# Patient Record
Sex: FEMALE | Race: WHITE | Employment: OTHER | URBAN - METROPOLITAN AREA
[De-identification: names, ages, dates, MRNs, and addresses within clinical notes are randomized per-mention and may not be internally consistent; named-entity substitution may affect disease eponyms.]

---

## 2017-12-07 ENCOUNTER — OFFICE VISIT (OUTPATIENT)
Dept: INTERNAL MEDICINE | Age: 82
End: 2017-12-07

## 2017-12-07 VITALS
BODY MASS INDEX: 26.93 KG/M2 | DIASTOLIC BLOOD PRESSURE: 64 MMHG | SYSTOLIC BLOOD PRESSURE: 137 MMHG | HEART RATE: 64 BPM | WEIGHT: 152 LBS | HEIGHT: 63 IN | RESPIRATION RATE: 12 BRPM

## 2017-12-07 DIAGNOSIS — Z23 NEED FOR PROPHYLACTIC VACCINATION WITH TETANUS-DIPHTHERIA (TD): ICD-10-CM

## 2017-12-07 DIAGNOSIS — E78.00 HYPERCHOLESTEROLEMIA: ICD-10-CM

## 2017-12-07 DIAGNOSIS — Z23 NEED FOR PROPHYLACTIC VACCINATION AND INOCULATION AGAINST VARICELLA: ICD-10-CM

## 2017-12-07 DIAGNOSIS — I10 ESSENTIAL HYPERTENSION: ICD-10-CM

## 2017-12-07 DIAGNOSIS — Z23 NEED FOR PROPHYLACTIC VACCINATION AGAINST STREPTOCOCCUS PNEUMONIAE (PNEUMOCOCCUS): ICD-10-CM

## 2017-12-07 DIAGNOSIS — R73.03 PREDIABETES: Primary | ICD-10-CM

## 2017-12-07 DIAGNOSIS — E03.9 HYPOTHYROIDISM, UNSPECIFIED TYPE: ICD-10-CM

## 2017-12-07 PROBLEM — K44.9 HIATAL HERNIA: Status: ACTIVE | Noted: 2017-12-07

## 2017-12-07 PROCEDURE — G0009 ADMIN PNEUMOCOCCAL VACCINE: HCPCS | Performed by: INTERNAL MEDICINE

## 2017-12-07 PROCEDURE — 1036F TOBACCO NON-USER: CPT | Performed by: INTERNAL MEDICINE

## 2017-12-07 PROCEDURE — 1123F ACP DISCUSS/DSCN MKR DOCD: CPT | Performed by: INTERNAL MEDICINE

## 2017-12-07 PROCEDURE — 90670 PCV13 VACCINE IM: CPT | Performed by: INTERNAL MEDICINE

## 2017-12-07 PROCEDURE — 1090F PRES/ABSN URINE INCON ASSESS: CPT | Performed by: INTERNAL MEDICINE

## 2017-12-07 PROCEDURE — G8484 FLU IMMUNIZE NO ADMIN: HCPCS | Performed by: INTERNAL MEDICINE

## 2017-12-07 PROCEDURE — G8419 CALC BMI OUT NRM PARAM NOF/U: HCPCS | Performed by: INTERNAL MEDICINE

## 2017-12-07 PROCEDURE — G8427 DOCREV CUR MEDS BY ELIG CLIN: HCPCS | Performed by: INTERNAL MEDICINE

## 2017-12-07 PROCEDURE — G8400 PT W/DXA NO RESULTS DOC: HCPCS | Performed by: INTERNAL MEDICINE

## 2017-12-07 PROCEDURE — 4040F PNEUMOC VAC/ADMIN/RCVD: CPT | Performed by: INTERNAL MEDICINE

## 2017-12-07 PROCEDURE — 99203 OFFICE O/P NEW LOW 30 MIN: CPT | Performed by: INTERNAL MEDICINE

## 2017-12-07 RX ORDER — TETANUS AND DIPHTHERIA TOXOIDS ADSORBED 2; 2 [LF]/.5ML; [LF]/.5ML
0.5 INJECTION INTRAMUSCULAR ONCE
Qty: 0.5 ML | Refills: 0 | Status: SHIPPED | OUTPATIENT
Start: 2017-12-07 | End: 2017-12-07

## 2017-12-07 RX ORDER — ATORVASTATIN CALCIUM 10 MG/1
10 TABLET, FILM COATED ORAL DAILY
COMMUNITY
Start: 2017-07-18 | End: 2018-10-11 | Stop reason: SDUPTHER

## 2017-12-07 RX ORDER — LOSARTAN POTASSIUM 100 MG/1
100 TABLET ORAL DAILY
Qty: 30 TABLET | Refills: 2 | Status: SHIPPED | OUTPATIENT
Start: 2017-12-07 | End: 2018-03-09 | Stop reason: SDUPTHER

## 2017-12-07 RX ORDER — LEVOTHYROXINE SODIUM 0.05 MG/1
50 TABLET ORAL DAILY
COMMUNITY
Start: 2017-07-17 | End: 2018-06-11 | Stop reason: SDUPTHER

## 2017-12-07 RX ORDER — LOSARTAN POTASSIUM 100 MG/1
100 TABLET ORAL DAILY
COMMUNITY
End: 2017-12-07 | Stop reason: SDUPTHER

## 2017-12-07 RX ORDER — ATENOLOL 50 MG/1
50 TABLET ORAL DAILY
Refills: 1 | COMMUNITY
Start: 2017-10-12 | End: 2018-05-16 | Stop reason: SDUPTHER

## 2017-12-07 RX ORDER — AMLODIPINE BESYLATE 5 MG/1
5 TABLET ORAL DAILY
COMMUNITY
Start: 2017-07-17 | End: 2018-05-16 | Stop reason: SDUPTHER

## 2017-12-07 RX ORDER — INFLUENZA A VIRUS A/MICHIGAN/45/2015 X-275 (H1N1) ANTIGEN (FORMALDEHYDE INACTIVATED), INFLUENZA A VIRUS A/SINGAPORE/INFIMH-16-0019/2016 IVR-186 (H3N2) ANTIGEN (FORMALDEHYDE INACTIVATED), AND INFLUENZA B VIRUS B/MARYLAND/15/2016 BX-69A (A B/COLORADO/6/2017-LIKE VIRUS) ANTIGEN (FORMALDEHYDE INACTIVATED) 60; 60; 60 UG/.5ML; UG/.5ML; UG/.5ML
1 INJECTION, SUSPENSION INTRAMUSCULAR ONCE
COMMUNITY
Start: 2017-10-16 | End: 2018-03-12 | Stop reason: ALTCHOICE

## 2017-12-07 NOTE — PROGRESS NOTES
PROGRESS NOTE:    Bethany Cage    12/7/2017    Chief Complaint   Patient presents with   Crystal Trujillo       HPI:    Mr(s)Lula Cage presents to clinic today with issues noted above. Patient is taking BP medications at home without size effects, BP is being checked at home. Patient is tolerating anti-lipid meds such as statin without complications, no myalgia. She is preDM but is not on any meds. Patient denies chest pain, SOB, NVD, FC, rash, malaise, rigor, dizziness/lightheadness, other pertinent ROS was also reviewed. /64   Pulse 64   Resp 12   Ht 5' 3\" (1.6 m)   Wt 152 lb (68.9 kg)   LMP  (LMP Unknown)   BMI 26.93 kg/m²   Body mass index is 26.93 kg/m². Physical Exam:    Gen: Patient appears well groomed, well appearing  HEAD: Atraumatic, normocephalic,   Eyes: PERRLA, EOMI   Neck: supple, no thyroid nodule appreciated, no JVD  Chest: Clear to auscultation DARIUS, unlabored breathing, normal expansion  Heart: Regular rate, regular rhythm, no murmur, no rub  Abdomen: Non-tender, non-distended, bowel sounds present x3  Extremities: no edema, distal pulses intact  Patient was alert and oriented to person, place and time    [facility] was seen today for established new doctor. Diagnoses and all orders for this visit:    Prediabetes  Her A1c was 6.1% at one point, recheck, advised of low processed carb diet  -     HEMOGLOBIN A1C; Future    Essential hypertension  Should start ambulatory home BP monitoring, follow renal function  -     COMPREHENSIVE METABOLIC PANEL; Future  -     losartan (COZAAR) 100 MG tablet; Take 1 tablet by mouth daily    Hypercholesterolemia  Patient is tolerating anti-lipid meds such as statin without complications, no myalgia. -     Lipid, Fasting; Future  -     COMPREHENSIVE METABOLIC PANEL;  Future    Hypothyroidism, unspecified type  She is taking her synthroid but she take it together with other meds, follow levels  -     TSH with Reflex; APPENDECTOMY      HYSTERECTOMY, VAGINAL      TONSILLECTOMY N/A        Social History   Substance Use Topics    Smoking status: Former Smoker    Smokeless tobacco: Never Used    Alcohol use No       Family History   Problem Relation Age of Onset    Cancer Mother      BCA    Heart Attack Father     Heart Attack Brother      COPD, MI

## 2017-12-07 NOTE — PATIENT INSTRUCTIONS
Patient Education        DASH Diet: Care Instructions  Your Care Instructions    The DASH diet is an eating plan that can help lower your blood pressure. DASH stands for Dietary Approaches to Stop Hypertension. Hypertension is high blood pressure. The DASH diet focuses on eating foods that are high in calcium, potassium, and magnesium. These nutrients can lower blood pressure. The foods that are highest in these nutrients are fruits, vegetables, low-fat dairy products, nuts, seeds, and legumes. But taking calcium, potassium, and magnesium supplements instead of eating foods that are high in those nutrients does not have the same effect. The DASH diet also includes whole grains, fish, and poultry. The DASH diet is one of several lifestyle changes your doctor may recommend to lower your high blood pressure. Your doctor may also want you to decrease the amount of sodium in your diet. Lowering sodium while following the DASH diet can lower blood pressure even further than just the DASH diet alone. Follow-up care is a key part of your treatment and safety. Be sure to make and go to all appointments, and call your doctor if you are having problems. It's also a good idea to know your test results and keep a list of the medicines you take. How can you care for yourself at home? Following the DASH diet  · Eat 4 to 5 servings of fruit each day. A serving is 1 medium-sized piece of fruit, ½ cup chopped or canned fruit, 1/4 cup dried fruit, or 4 ounces (½ cup) of fruit juice. Choose fruit more often than fruit juice. · Eat 4 to 5 servings of vegetables each day. A serving is 1 cup of lettuce or raw leafy vegetables, ½ cup of chopped or cooked vegetables, or 4 ounces (½ cup) of vegetable juice. Choose vegetables more often than vegetable juice. · Get 2 to 3 servings of low-fat and fat-free dairy each day. A serving is 8 ounces of milk, 1 cup of yogurt, or 1 ½ ounces of cheese. · Eat 6 to 8 servings of grains each day. A serving is 1 slice of bread, 1 ounce of dry cereal, or ½ cup of cooked rice, pasta, or cooked cereal. Try to choose whole-grain products as much as possible. · Limit lean meat, poultry, and fish to 2 servings each day. A serving is 3 ounces, about the size of a deck of cards. · Eat 4 to 5 servings of nuts, seeds, and legumes (cooked dried beans, lentils, and split peas) each week. A serving is 1/3 cup of nuts, 2 tablespoons of seeds, or ½ cup of cooked beans or peas. · Limit fats and oils to 2 to 3 servings each day. A serving is 1 teaspoon of vegetable oil or 2 tablespoons of salad dressing. · Limit sweets and added sugars to 5 servings or less a week. A serving is 1 tablespoon jelly or jam, ½ cup sorbet, or 1 cup of lemonade. · Eat less than 2,300 milligrams (mg) of sodium a day. If you limit your sodium to 1,500 mg a day, you can lower your blood pressure even more. Tips for success  · Start small. Do not try to make dramatic changes to your diet all at once. You might feel that you are missing out on your favorite foods and then be more likely to not follow the plan. Make small changes, and stick with them. Once those changes become habit, add a few more changes. · Try some of the following:  ¨ Make it a goal to eat a fruit or vegetable at every meal and at snacks. This will make it easy to get the recommended amount of fruits and vegetables each day. ¨ Try yogurt topped with fruit and nuts for a snack or healthy dessert. ¨ Add lettuce, tomato, cucumber, and onion to sandwiches. ¨ Combine a ready-made pizza crust with low-fat mozzarella cheese and lots of vegetable toppings. Try using tomatoes, squash, spinach, broccoli, carrots, cauliflower, and onions. ¨ Have a variety of cut-up vegetables with a low-fat dip as an appetizer instead of chips and dip. ¨ Sprinkle sunflower seeds or chopped almonds over salads. Or try adding chopped walnuts or almonds to cooked vegetables.   ¨ Try some vegetarian

## 2018-03-09 DIAGNOSIS — I10 ESSENTIAL HYPERTENSION: ICD-10-CM

## 2018-03-09 RX ORDER — LOSARTAN POTASSIUM 100 MG/1
100 TABLET ORAL DAILY
Qty: 30 TABLET | Refills: 2 | Status: SHIPPED | OUTPATIENT
Start: 2018-03-09 | End: 2018-05-16 | Stop reason: SDUPTHER

## 2018-03-12 ENCOUNTER — OFFICE VISIT (OUTPATIENT)
Dept: INTERNAL MEDICINE CLINIC | Age: 83
End: 2018-03-12

## 2018-03-12 VITALS
DIASTOLIC BLOOD PRESSURE: 80 MMHG | SYSTOLIC BLOOD PRESSURE: 126 MMHG | WEIGHT: 160.2 LBS | RESPIRATION RATE: 12 BRPM | BODY MASS INDEX: 28.38 KG/M2 | HEART RATE: 52 BPM

## 2018-03-12 DIAGNOSIS — Z23 NEED FOR PROPHYLACTIC VACCINATION WITH TETANUS-DIPHTHERIA (TD): ICD-10-CM

## 2018-03-12 DIAGNOSIS — I10 ESSENTIAL HYPERTENSION: ICD-10-CM

## 2018-03-12 DIAGNOSIS — M72.2 PLANTAR FASCIITIS: Primary | ICD-10-CM

## 2018-03-12 DIAGNOSIS — E03.9 HYPOTHYROIDISM, UNSPECIFIED TYPE: ICD-10-CM

## 2018-03-12 DIAGNOSIS — E78.00 HYPERCHOLESTEROLEMIA: ICD-10-CM

## 2018-03-12 PROCEDURE — 99214 OFFICE O/P EST MOD 30 MIN: CPT | Performed by: INTERNAL MEDICINE

## 2018-03-12 RX ORDER — TETANUS AND DIPHTHERIA TOXOIDS ADSORBED 2; 2 [LF]/.5ML; [LF]/.5ML
0.5 INJECTION INTRAMUSCULAR ONCE
Qty: 0.5 ML | Refills: 0 | Status: SHIPPED | OUTPATIENT
Start: 2018-03-12 | End: 2018-03-12

## 2018-03-12 ASSESSMENT — PATIENT HEALTH QUESTIONNAIRE - PHQ9
SUM OF ALL RESPONSES TO PHQ QUESTIONS 1-9: 0
2. FEELING DOWN, DEPRESSED OR HOPELESS: 0
SUM OF ALL RESPONSES TO PHQ9 QUESTIONS 1 & 2: 0
1. LITTLE INTEREST OR PLEASURE IN DOING THINGS: 0

## 2018-03-12 NOTE — PROGRESS NOTES
meds such as statin without complications, no myalgia. Hypothyroidism, unspecified type  Patient is taking her meds as prescribed, with at least 30 min prior to other foods/meds, not missing doses. Preventive medicine: patient has considered indicated immunizations. No orders of the defined types were placed in this encounter. Prior to Admission medications    Medication Sig Start Date End Date Taking?  Authorizing Provider   losartan (COZAAR) 100 MG tablet Take 1 tablet by mouth daily 3/9/18  Yes Tabby Catalan,    amLODIPine (NORVASC) 5 MG tablet Take 5 mg by mouth daily 7/17/17  Yes Historical Provider, MD   atenolol (TENORMIN) 50 MG tablet Take 50 mg by mouth daily 10/12/17  Yes Historical Provider, MD   atorvastatin (LIPITOR) 10 MG tablet Take 10 mg by mouth daily 7/18/17  Yes Historical Provider, MD   levothyroxine (SYNTHROID) 50 MCG tablet Take 50 mcg by mouth daily 7/17/17  Yes Historical Provider, MD       Past Medical History:   Diagnosis Date    GERD (gastroesophageal reflux disease) 3/8/2011    Headache     Hyperlipidemia     Hypertension     Osteoarthritis        Past Surgical History:   Procedure Laterality Date    APPENDECTOMY      HYSTERECTOMY, VAGINAL      TONSILLECTOMY N/A        Social History   Substance Use Topics    Smoking status: Former Smoker    Smokeless tobacco: Never Used    Alcohol use No       Family History   Problem Relation Age of Onset    Cancer Mother      BCA    Heart Attack Father     Heart Attack Brother      COPD, MI

## 2018-03-12 NOTE — PATIENT INSTRUCTIONS
questions about a medical condition or this instruction, always ask your healthcare professional. [place] [address] any warranty or liability for your use of this information.

## 2018-06-11 DIAGNOSIS — I10 ESSENTIAL HYPERTENSION: ICD-10-CM

## 2018-06-11 RX ORDER — LEVOTHYROXINE SODIUM 0.05 MG/1
TABLET ORAL
Qty: 90 TABLET | Refills: 1 | Status: SHIPPED | OUTPATIENT
Start: 2018-06-11 | End: 2018-09-25 | Stop reason: SDUPTHER

## 2018-06-11 RX ORDER — LOSARTAN POTASSIUM 100 MG/1
100 TABLET ORAL DAILY
Qty: 30 TABLET | Refills: 2 | Status: SHIPPED | OUTPATIENT
Start: 2018-06-11 | End: 2018-09-24 | Stop reason: SDUPTHER

## 2018-08-16 RX ORDER — ATENOLOL 50 MG/1
TABLET ORAL
Qty: 30 TABLET | Refills: 2 | Status: SHIPPED | OUTPATIENT
Start: 2018-08-16 | End: 2018-11-11 | Stop reason: SDUPTHER

## 2018-08-16 RX ORDER — AMLODIPINE BESYLATE 5 MG/1
TABLET ORAL
Qty: 30 TABLET | Refills: 2 | Status: SHIPPED | OUTPATIENT
Start: 2018-08-16 | End: 2018-11-11 | Stop reason: SDUPTHER

## 2018-09-24 DIAGNOSIS — I10 ESSENTIAL HYPERTENSION: ICD-10-CM

## 2018-09-25 RX ORDER — LOSARTAN POTASSIUM 100 MG/1
100 TABLET ORAL DAILY
Qty: 30 TABLET | Refills: 0 | Status: SHIPPED | OUTPATIENT
Start: 2018-09-25 | End: 2018-11-08 | Stop reason: SDUPTHER

## 2018-09-25 RX ORDER — LEVOTHYROXINE SODIUM 0.05 MG/1
TABLET ORAL
Qty: 90 TABLET | Refills: 1 | Status: SHIPPED | OUTPATIENT
Start: 2018-09-25 | End: 2019-05-05 | Stop reason: SDUPTHER

## 2018-10-03 ENCOUNTER — OFFICE VISIT (OUTPATIENT)
Dept: INTERNAL MEDICINE CLINIC | Age: 83
End: 2018-10-03
Payer: MEDICARE

## 2018-10-03 VITALS
RESPIRATION RATE: 16 BRPM | HEART RATE: 64 BPM | BODY MASS INDEX: 28.52 KG/M2 | SYSTOLIC BLOOD PRESSURE: 138 MMHG | WEIGHT: 161 LBS | DIASTOLIC BLOOD PRESSURE: 88 MMHG

## 2018-10-03 DIAGNOSIS — R60.0 LOCALIZED EDEMA: ICD-10-CM

## 2018-10-03 DIAGNOSIS — R73.03 PREDIABETES: ICD-10-CM

## 2018-10-03 DIAGNOSIS — I10 ESSENTIAL HYPERTENSION: Primary | ICD-10-CM

## 2018-10-03 DIAGNOSIS — E03.9 HYPOTHYROIDISM, UNSPECIFIED TYPE: ICD-10-CM

## 2018-10-03 DIAGNOSIS — Z78.0 POST-MENOPAUSAL: ICD-10-CM

## 2018-10-03 DIAGNOSIS — E78.00 HYPERCHOLESTEROLEMIA: ICD-10-CM

## 2018-10-03 PROCEDURE — 99214 OFFICE O/P EST MOD 30 MIN: CPT | Performed by: INTERNAL MEDICINE

## 2018-10-03 RX ORDER — INFLUENZA A VIRUS A/MICHIGAN/45/2015 X-275 (H1N1) ANTIGEN (FORMALDEHYDE INACTIVATED), INFLUENZA A VIRUS A/SINGAPORE/INFIMH-16-0019/2016 IVR-186 (H3N2) ANTIGEN (FORMALDEHYDE INACTIVATED), AND INFLUENZA B VIRUS B/MARYLAND/15/2016 BX-69A (A B/COLORADO/6/2017-LIKE VIRUS) ANTIGEN (FORMALDEHYDE INACTIVATED) 60; 60; 60 UG/.5ML; UG/.5ML; UG/.5ML
1 INJECTION, SUSPENSION INTRAMUSCULAR ONCE
Refills: 0 | COMMUNITY
Start: 2018-09-25 | End: 2018-09-25

## 2018-10-03 NOTE — PATIENT INSTRUCTIONS
Patient Education        Home Blood Pressure Test: About This Test  What is it? A home blood pressure test allows you to keep track of your blood pressure at home. Blood pressure is a measure of the force of blood against the walls of your arteries. Blood pressure readings include two numbers, such as 130/80 (say \"130 over 80\"). The first number is the systolic pressure. The second number is the diastolic pressure. Why is this test done? You may do this test at home to:  · Find out if you have high blood pressure. · Track your blood pressure if you have high blood pressure. · Track how well medicine is working to reduce high blood pressure. · Check how lifestyle changes, such as weight loss and exercise, are affecting blood pressure. How can you prepare for the test?  · Do not use caffeine, tobacco, or medicines known to raise blood pressure (such as nasal decongestant sprays) for at least 30 minutes before taking your blood pressure. · Do not exercise for at least 30 minutes before taking your blood pressure. What happens before the test?  Take your blood pressure while you feel comfortable and relaxed. Sit quietly with both feet on the floor for at least 5 minutes before the test.  What happens during the test?  · Sit with your arm slightly bent and resting on a table so that your upper arm is at the same level as your heart. · Roll up your sleeve or take off your shirt to expose your upper arm. · Wrap the blood pressure cuff around your upper arm so that the lower edge of the cuff is about 1 inch above the bend of your elbow. Proceed with the following steps depending on if you are using an automatic or manual pressure monitor. Automatic blood pressure monitors  · Press the on/off button on the automatic monitor and wait until the ready-to-measure \"heart\" symbol appears next to zero in the display window. · Press the start button. The cuff will inflate and deflate by itself.   · Your blood easy to get the recommended amount of fruits and vegetables each day. ¨ Try yogurt topped with fruit and nuts for a snack or healthy dessert. ¨ Add lettuce, tomato, cucumber, and onion to sandwiches. ¨ Combine a ready-made pizza crust with low-fat mozzarella cheese and lots of vegetable toppings. Try using tomatoes, squash, spinach, broccoli, carrots, cauliflower, and onions. ¨ Have a variety of cut-up vegetables with a low-fat dip as an appetizer instead of chips and dip. ¨ Sprinkle sunflower seeds or chopped almonds over salads. Or try adding chopped walnuts or almonds to cooked vegetables. ¨ Try some vegetarian meals using beans and peas. Add garbanzo or kidney beans to salads. Make burritos and tacos with mashed barcenas beans or black beans. Where can you learn more? Go to https://[email or web address].GreenSand. org and sign in to your Collarity account. Enter N525 in the [employer] box to learn more about \"DASH Diet: Care Instructions. \"     If you do not have an account, please click on the \"Sign Up Now\" link. Current as of: December 6, 2017  Content Version: 11.7  © 6280-8323 [employer]. Care instructions adapted under license by [facility] [place] ([facility]). If you have questions about a medical condition or this instruction, always ask your healthcare professional. [place] [address] any warranty or liability for your use of this information.

## 2018-10-11 ENCOUNTER — TELEPHONE (OUTPATIENT)
Dept: INTERNAL MEDICINE CLINIC | Age: 83
End: 2018-10-11

## 2018-10-11 RX ORDER — ATORVASTATIN CALCIUM 10 MG/1
10 TABLET, FILM COATED ORAL DAILY
Qty: 30 TABLET | Refills: 5 | Status: SHIPPED | OUTPATIENT
Start: 2018-10-11 | End: 2019-02-04 | Stop reason: SDUPTHER

## 2018-11-08 DIAGNOSIS — I10 ESSENTIAL HYPERTENSION: ICD-10-CM

## 2018-11-09 RX ORDER — LOSARTAN POTASSIUM 100 MG/1
100 TABLET ORAL DAILY
Qty: 30 TABLET | Refills: 5 | Status: SHIPPED | OUTPATIENT
Start: 2018-11-09 | End: 2019-06-05 | Stop reason: SDUPTHER

## 2018-11-12 RX ORDER — ATENOLOL 50 MG/1
TABLET ORAL
Qty: 30 TABLET | Refills: 2 | Status: SHIPPED | OUTPATIENT
Start: 2018-11-12 | End: 2019-02-11 | Stop reason: SDUPTHER

## 2018-11-12 RX ORDER — AMLODIPINE BESYLATE 5 MG/1
TABLET ORAL
Qty: 30 TABLET | Refills: 2 | Status: SHIPPED | OUTPATIENT
Start: 2018-11-12 | End: 2019-02-11 | Stop reason: SDUPTHER

## 2019-02-05 RX ORDER — ATORVASTATIN CALCIUM 10 MG/1
10 TABLET, FILM COATED ORAL DAILY
Qty: 30 TABLET | Refills: 1 | Status: SHIPPED | OUTPATIENT
Start: 2019-02-05 | End: 2019-07-22 | Stop reason: SDUPTHER

## 2019-02-11 RX ORDER — ATENOLOL 50 MG/1
TABLET ORAL
Qty: 30 TABLET | Refills: 1 | Status: SHIPPED | OUTPATIENT
Start: 2019-02-11 | End: 2019-03-14 | Stop reason: SDUPTHER

## 2019-02-11 RX ORDER — AMLODIPINE BESYLATE 5 MG/1
TABLET ORAL
Qty: 30 TABLET | Refills: 1 | Status: SHIPPED | OUTPATIENT
Start: 2019-02-11 | End: 2019-03-14 | Stop reason: SDUPTHER

## 2019-03-14 RX ORDER — AMLODIPINE BESYLATE 5 MG/1
TABLET ORAL
Qty: 30 TABLET | Refills: 2 | Status: SHIPPED | OUTPATIENT
Start: 2019-03-14 | End: 2019-04-04 | Stop reason: SDUPTHER

## 2019-03-14 RX ORDER — ATENOLOL 50 MG/1
TABLET ORAL
Qty: 30 TABLET | Refills: 2 | Status: SHIPPED | OUTPATIENT
Start: 2019-03-14 | End: 2019-10-29 | Stop reason: SDUPTHER

## 2019-04-04 ENCOUNTER — TELEPHONE (OUTPATIENT)
Dept: INTERNAL MEDICINE CLINIC | Age: 84
End: 2019-04-04

## 2019-04-04 ENCOUNTER — OFFICE VISIT (OUTPATIENT)
Dept: INTERNAL MEDICINE CLINIC | Age: 84
End: 2019-04-04
Payer: MEDICARE

## 2019-04-04 VITALS
OXYGEN SATURATION: 97 % | WEIGHT: 163 LBS | BODY MASS INDEX: 30 KG/M2 | HEIGHT: 62 IN | HEART RATE: 60 BPM | DIASTOLIC BLOOD PRESSURE: 60 MMHG | SYSTOLIC BLOOD PRESSURE: 132 MMHG | RESPIRATION RATE: 16 BRPM | TEMPERATURE: 98.7 F

## 2019-04-04 DIAGNOSIS — R29.898 LEFT ARM WEAKNESS: ICD-10-CM

## 2019-04-04 DIAGNOSIS — I10 ESSENTIAL HYPERTENSION: Primary | ICD-10-CM

## 2019-04-04 DIAGNOSIS — R73.03 PREDIABETES: ICD-10-CM

## 2019-04-04 DIAGNOSIS — M54.12 CERVICAL RADICULOPATHY: ICD-10-CM

## 2019-04-04 DIAGNOSIS — E03.9 HYPOTHYROIDISM, UNSPECIFIED TYPE: ICD-10-CM

## 2019-04-04 DIAGNOSIS — E78.00 HYPERCHOLESTEROLEMIA: ICD-10-CM

## 2019-04-04 PROCEDURE — 99214 OFFICE O/P EST MOD 30 MIN: CPT | Performed by: INTERNAL MEDICINE

## 2019-04-04 RX ORDER — CELECOXIB 200 MG/1
200 CAPSULE ORAL DAILY
Qty: 30 CAPSULE | Refills: 0 | Status: SHIPPED | OUTPATIENT
Start: 2019-04-04 | End: 2019-05-02 | Stop reason: SDUPTHER

## 2019-04-04 ASSESSMENT — PATIENT HEALTH QUESTIONNAIRE - PHQ9
SUM OF ALL RESPONSES TO PHQ9 QUESTIONS 1 & 2: 1
1. LITTLE INTEREST OR PLEASURE IN DOING THINGS: 1
SUM OF ALL RESPONSES TO PHQ QUESTIONS 1-9: 1
SUM OF ALL RESPONSES TO PHQ QUESTIONS 1-9: 1
2. FEELING DOWN, DEPRESSED OR HOPELESS: 0

## 2019-04-04 NOTE — TELEPHONE ENCOUNTER
PA SUBMITTED FOR Celecoxib 200MG OR CAPS  Key: [id number] - PA Case ID: [id number] - Rx #: [id number]   STATUS: PENDING

## 2019-04-04 NOTE — PATIENT INSTRUCTIONS
doctor and loved ones know your health care wishes. Doctors advise that everyone prepare these papers before any type of surgery or procedure. Procedures can be stressful. This information will help you understand what you can expect. And it will help you safely prepare for your procedure. What happens on the day of the procedure?    · Your doctor may tell you not to eat or drink for a certain amount of time before the procedure. Follow these instructions carefully.     · Take a bath or shower before you come in for your procedure. Do not apply lotions, perfumes, deodorants, or nail polish.    At the hospital or surgery center   · Bring a picture ID.     · You may get medicine that relaxes you or puts you in a light sleep. The area being worked on will be numb.     · The procedure will take 5 to 15 minutes. You will go home about an hour later. Going home   · Be sure you have someone to drive you home. Anesthesia and pain medicine make it unsafe for you to drive.     · You will be given more specific instructions about recovering from your procedure. They will cover things like diet, follow-up care, driving, and getting back to your normal routine. When should you call your doctor? · You have questions or concerns.     · You don't understand how to prepare for your procedure.     · You become ill before the procedure (such as fever, flu, or a cold).     · You need to reschedule or have changed your mind about having the procedure. Where can you learn more? Go to [email or web address]://Mobile [email or web address] [email or web address] [email or web address] [email or web address] org and sign in to your [employer] account. Enter T330 in the [facility] box to learn more about \"Cervical Epidural Injection: Before Your Procedure. \"     If you do not have an account, please click on the \"Sign Up Now\" link. Current as of: September 20, 2018  Content Version: 11.9  © 0835-5733 [employer]. Care instructions adapted under license by [facility] ([facility]).  If you have questions about a medical condition or this instruction, always ask your healthcare professional. [address] any warranty or liability for your use of this information. Patient Education        DASH Diet: Care Instructions  Your Care Instructions    The DASH diet is an eating plan that can help lower your blood pressure. DASH stands for Dietary Approaches to Stop Hypertension. Hypertension is high blood pressure. The DASH diet focuses on eating foods that are high in calcium, potassium, and magnesium. These nutrients can lower blood pressure. The foods that are highest in these nutrients are fruits, vegetables, low-fat dairy products, nuts, seeds, and legumes. But taking calcium, potassium, and magnesium supplements instead of eating foods that are high in those nutrients does not have the same effect. The DASH diet also includes whole grains, fish, and poultry. The DASH diet is one of several lifestyle changes your doctor may recommend to lower your high blood pressure. Your doctor may also want you to decrease the amount of sodium in your diet. Lowering sodium while following the DASH diet can lower blood pressure even further than just the DASH diet alone. Follow-up care is a key part of your treatment and safety. Be sure to make and go to all appointments, and call your doctor if you are having problems. It's also a good idea to know your test results and keep a list of the medicines you take. How can you care for yourself at home? Following the DASH diet  · Eat 4 to 5 servings of fruit each day. A serving is 1 medium-sized piece of fruit, ½ cup chopped or canned fruit, 1/4 cup dried fruit, or 4 ounces (½ cup) of fruit juice. Choose fruit more often than fruit juice. · Eat 4 to 5 servings of vegetables each day. A serving is 1 cup of lettuce or raw leafy vegetables, ½ cup of chopped or cooked vegetables, or 4 ounces (½ cup) of vegetable juice.  Choose vegetables more often than vegetable juice. · Get 2 to 3 servings of low-fat and fat-free dairy each day. A serving is 8 ounces of milk, 1 cup of yogurt, or 1 ½ ounces of cheese. · Eat 6 to 8 servings of grains each day. A serving is 1 slice of bread, 1 ounce of dry cereal, or ½ cup of cooked rice, pasta, or cooked cereal. Try to choose whole-grain products as much as possible. · Limit lean meat, poultry, and fish to 2 servings each day. A serving is 3 ounces, about the size of a deck of cards. · Eat 4 to 5 servings of nuts, seeds, and legumes (cooked dried beans, lentils, and split peas) each week. A serving is 1/3 cup of nuts, 2 tablespoons of seeds, or ½ cup of cooked beans or peas. · Limit fats and oils to 2 to 3 servings each day. A serving is 1 teaspoon of vegetable oil or 2 tablespoons of salad dressing. · Limit sweets and added sugars to 5 servings or less a week. A serving is 1 tablespoon jelly or jam, ½ cup sorbet, or 1 cup of lemonade. · Eat less than 2,300 milligrams (mg) of sodium a day. If you limit your sodium to 1,500 mg a day, you can lower your blood pressure even more. Tips for success  · Start small. Do not try to make dramatic changes to your diet all at once. You might feel that you are missing out on your favorite foods and then be more likely to not follow the plan. Make small changes, and stick with them. Once those changes become habit, add a few more changes. · Try some of the following:  ? Make it a goal to eat a fruit or vegetable at every meal and at snacks. This will make it easy to get the recommended amount of fruits and vegetables each day. ? Try yogurt topped with fruit and nuts for a snack or healthy dessert. ? Add lettuce, tomato, cucumber, and onion to sandwiches. ? Combine a ready-made pizza crust with low-fat mozzarella cheese and lots of vegetable toppings. Try using tomatoes, squash, spinach, broccoli, carrots, cauliflower, and onions. ?  Have a variety of cut-up vegetables with a low-fat

## 2019-04-04 NOTE — PROGRESS NOTES
Neck: supple, no thyroid nodule appreciated, no JVD  Chest: Clear to auscultation DARIUS, unlabored breathing, normal expansion  Heart: Regular rate, regular rhythm, no murmur, no rub  Abdomen: Non-tender, non-distended, bowel sounds present x3  Extremities: no edema, distal pulses intact  Patient was alert and oriented to person, place and time  Neuro: Alert and oriented to time, place, person, normal ambulation with good balance  CN II-XII intact grossly, hand  +5/5 R, +5/5 L  Motor: Left upper extremity strength [email or web address], ~5/5@elbow,    Right upper extremity strength [email or web address], ~5/5@elbow  DTR's: L brachioradialis +3/4   R brachioradialis +2/4   L deltoid reflex +2/4    R deltoid relfex +2/4    Assessment and Plan:    Kerri Bajwa was seen today for 6 month follow-up. Diagnoses and all orders for this visit:    Cervical radiculopathy  Left arm weakness  Suspect cervical spine nerve impingement from progression OA changes, will get both Xray with MRI, consider ortho eval, start Celebrex  -     XR CERVICAL SPINE (2-3 VIEWS); Future  -     MRI CERVICAL SPINE WO CONTRAST; Future    Essential hypertension  Controlled overall, continue meds, follow renal function   -     COMPREHENSIVE METABOLIC PANEL; Future  -     Lipid, Fasting; Future  -     HEMOGLOBIN A1C; Future    Hypercholesterolemia  Patient is tolerating anti-lipid meds such as statin without complications, no myalgia.     -     COMPREHENSIVE METABOLIC PANEL; Future  -     Lipid, Fasting; Future  -     HEMOGLOBIN A1C; Future    Hypothyroidism, unspecified type  Patient is taking her meds as prescribed, with at least 30 min prior to other foods/meds, not missing doses. -     TSH with Reflex; Future    Prediabetes  -     HEMOGLOBIN A1C; Future    Other orders  -     celecoxib (CELEBREX) 200 MG capsule;  Take 1 capsule by mouth daily    Orders Placed This Encounter   Procedures    XR CERVICAL SPINE (2-3 VIEWS)    MRI CERVICAL SPINE WO CONTRAST    COMPREHENSIVE METABOLIC PANEL    Lipid, Fasting    HEMOGLOBIN A1C    TSH with Reflex       Prior to Admission medications    Medication Sig Start Date End Date Taking?  Authorizing Provider   celecoxib (CELEBREX) 200 MG capsule Take 1 capsule by mouth daily 4/4/19  Yes Shaggy Hernández DO   atenolol (TENORMIN) 50 MG tablet TAKE 1 TABLET BY MOUTH EVERY DAY 3/14/19  Yes Shaggy Hernández DO   atorvastatin (LIPITOR) 10 MG tablet Take 1 tablet by mouth daily 2/5/19  Yes Shaggy Hernández DO   losartan (COZAAR) 100 MG tablet TAKE 1 TABLET BY MOUTH DAILY 11/9/18  Yes Shaggy Hernández DO   levothyroxine (SYNTHROID) 50 MCG tablet TAKE 1 TABLET BY MOUTH EVERY DAY 9/25/18  Yes Shaggy Hernández DO   amLODIPine (NORVASC) 5 MG tablet TAKE 1 TABLET BY MOUTH EVERY DAY 4/5/19   Shaggy Hernández DO       Past Medical History:   Diagnosis Date    GERD (gastroesophageal reflux disease) 3/8/2011    Headache     Hyperlipidemia     Hypertension     Osteoarthritis        Past Surgical History:   Procedure Laterality Date    APPENDECTOMY      HYSTERECTOMY, VAGINAL      TONSILLECTOMY N/A        Social History     Tobacco Use    Smoking status: Former Smoker    Smokeless tobacco: Never Used   Substance Use Topics    Alcohol use: No       Family History   Problem Relation Age of Onset    Cancer Mother         BCA    Heart Attack Father     Heart Attack Brother         COPD, MI

## 2019-04-05 RX ORDER — AMLODIPINE BESYLATE 5 MG/1
TABLET ORAL
Qty: 30 TABLET | Refills: 5 | Status: SHIPPED | OUTPATIENT
Start: 2019-04-05 | End: 2019-09-30 | Stop reason: SDUPTHER

## 2019-04-05 RX ORDER — AMLODIPINE BESYLATE 5 MG/1
TABLET ORAL
Qty: 30 TABLET | Refills: 0 | OUTPATIENT
Start: 2019-04-05

## 2019-04-09 ENCOUNTER — HOSPITAL ENCOUNTER (OUTPATIENT)
Age: 84
Discharge: HOME | End: 2019-04-09
Payer: MEDICARE

## 2019-04-09 ENCOUNTER — HOSPITAL ENCOUNTER (OUTPATIENT)
Dept: GENERAL RADIOLOGY | Age: 84
Discharge: HOME | End: 2019-04-09
Payer: MEDICARE

## 2019-04-09 DIAGNOSIS — R29.898 LEFT ARM WEAKNESS: ICD-10-CM

## 2019-04-09 DIAGNOSIS — M54.12 CERVICAL RADICULOPATHY: ICD-10-CM

## 2019-04-09 PROCEDURE — 72040 X-RAY EXAM NECK SPINE 2-3 VW: CPT

## 2019-04-12 ENCOUNTER — HOSPITAL ENCOUNTER (OUTPATIENT)
Dept: MRI IMAGING | Age: 84
Discharge: HOME | End: 2019-04-12
Payer: MEDICARE

## 2019-04-12 DIAGNOSIS — M54.12 CERVICAL RADICULOPATHY: ICD-10-CM

## 2019-04-12 DIAGNOSIS — R29.898 LEFT ARM WEAKNESS: ICD-10-CM

## 2019-04-12 PROCEDURE — 72141 MRI NECK SPINE W/O DYE: CPT

## 2019-04-15 ENCOUNTER — TELEPHONE (OUTPATIENT)
Dept: INTERNAL MEDICINE CLINIC | Age: 84
End: 2019-04-15

## 2019-05-07 RX ORDER — LEVOTHYROXINE SODIUM 0.05 MG/1
TABLET ORAL
Qty: 90 TABLET | Refills: 1 | Status: SHIPPED | OUTPATIENT
Start: 2019-05-07 | End: 2019-10-29 | Stop reason: SDUPTHER

## 2019-06-05 DIAGNOSIS — I10 ESSENTIAL HYPERTENSION: ICD-10-CM

## 2019-06-05 RX ORDER — LOSARTAN POTASSIUM 100 MG/1
100 TABLET ORAL DAILY
Qty: 30 TABLET | Refills: 5 | Status: SHIPPED | OUTPATIENT
Start: 2019-06-05 | End: 2020-01-10 | Stop reason: SDUPTHER

## 2019-07-25 RX ORDER — ATORVASTATIN CALCIUM 10 MG/1
10 TABLET, FILM COATED ORAL DAILY
Qty: 30 TABLET | Refills: 2 | Status: SHIPPED | OUTPATIENT
Start: 2019-07-25 | End: 2019-10-21 | Stop reason: SDUPTHER

## 2019-09-11 ENCOUNTER — OFFICE VISIT (OUTPATIENT)
Dept: INTERNAL MEDICINE CLINIC | Age: 84
End: 2019-09-11
Payer: MEDICARE

## 2019-09-11 VITALS
SYSTOLIC BLOOD PRESSURE: 154 MMHG | BODY MASS INDEX: 30 KG/M2 | TEMPERATURE: 98 F | WEIGHT: 163 LBS | HEART RATE: 52 BPM | RESPIRATION RATE: 16 BRPM | DIASTOLIC BLOOD PRESSURE: 76 MMHG | OXYGEN SATURATION: 95 % | HEIGHT: 62 IN

## 2019-09-11 DIAGNOSIS — E03.9 HYPOTHYROIDISM, UNSPECIFIED TYPE: ICD-10-CM

## 2019-09-11 DIAGNOSIS — M54.12 CERVICAL RADICULOPATHY: Primary | ICD-10-CM

## 2019-09-11 DIAGNOSIS — Z80.3 FAMILY HISTORY OF BREAST CANCER: ICD-10-CM

## 2019-09-11 DIAGNOSIS — E78.5 HYPERLIPIDEMIA, UNSPECIFIED HYPERLIPIDEMIA TYPE: ICD-10-CM

## 2019-09-11 DIAGNOSIS — N18.30 STAGE 3 CHRONIC KIDNEY DISEASE (HCC): ICD-10-CM

## 2019-09-11 DIAGNOSIS — Z12.39 SCREENING FOR BREAST CANCER: ICD-10-CM

## 2019-09-11 DIAGNOSIS — I10 ESSENTIAL HYPERTENSION: ICD-10-CM

## 2019-09-11 PROBLEM — M50.90 CERVICAL DISC DISEASE: Status: ACTIVE | Noted: 2019-01-01

## 2019-09-11 PROCEDURE — 90653 IIV ADJUVANT VACCINE IM: CPT | Performed by: INTERNAL MEDICINE

## 2019-09-11 PROCEDURE — 99214 OFFICE O/P EST MOD 30 MIN: CPT | Performed by: INTERNAL MEDICINE

## 2019-09-11 PROCEDURE — G0008 ADMIN INFLUENZA VIRUS VAC: HCPCS | Performed by: INTERNAL MEDICINE

## 2019-09-11 ASSESSMENT — ENCOUNTER SYMPTOMS
NAUSEA: 0
SHORTNESS OF BREATH: 0
ABDOMINAL PAIN: 0

## 2019-09-11 NOTE — PATIENT INSTRUCTIONS
Please bring in pill bottles next time. Use Tylenol up to 1000 mg 3 times/day, not Celebrex for the neck pain.  Consider Physical Therapy    Flu shot    mammogram

## 2019-09-30 RX ORDER — AMLODIPINE BESYLATE 5 MG/1
TABLET ORAL
Qty: 30 TABLET | Refills: 5 | Status: SHIPPED | OUTPATIENT
Start: 2019-09-30 | End: 2020-04-24 | Stop reason: SDUPTHER

## 2019-10-17 ENCOUNTER — TELEPHONE (OUTPATIENT)
Dept: INTERNAL MEDICINE CLINIC | Age: 84
End: 2019-10-17

## 2019-10-17 ENCOUNTER — OFFICE VISIT (OUTPATIENT)
Dept: INTERNAL MEDICINE CLINIC | Age: 84
End: 2019-10-17
Payer: MEDICARE

## 2019-10-17 VITALS
BODY MASS INDEX: 30 KG/M2 | WEIGHT: 163 LBS | RESPIRATION RATE: 16 BRPM | OXYGEN SATURATION: 98 % | DIASTOLIC BLOOD PRESSURE: 60 MMHG | HEIGHT: 62 IN | SYSTOLIC BLOOD PRESSURE: 140 MMHG | TEMPERATURE: 97.8 F | HEART RATE: 60 BPM

## 2019-10-17 DIAGNOSIS — I10 ESSENTIAL HYPERTENSION: ICD-10-CM

## 2019-10-17 DIAGNOSIS — M54.12 CERVICAL RADICULOPATHY: ICD-10-CM

## 2019-10-17 DIAGNOSIS — Z00.00 ROUTINE GENERAL MEDICAL EXAMINATION AT A HEALTH CARE FACILITY: Primary | ICD-10-CM

## 2019-10-17 PROCEDURE — G0438 PPPS, INITIAL VISIT: HCPCS | Performed by: INTERNAL MEDICINE

## 2019-10-17 ASSESSMENT — PATIENT HEALTH QUESTIONNAIRE - PHQ9
SUM OF ALL RESPONSES TO PHQ QUESTIONS 1-9: 1
SUM OF ALL RESPONSES TO PHQ QUESTIONS 1-9: 1

## 2019-10-17 ASSESSMENT — LIFESTYLE VARIABLES: HOW OFTEN DO YOU HAVE A DRINK CONTAINING ALCOHOL: 0

## 2019-10-18 ENCOUNTER — HOSPITAL ENCOUNTER (OUTPATIENT)
Dept: PHYSICAL THERAPY | Age: 84
Setting detail: THERAPIES SERIES
Discharge: HOME | End: 2019-10-18
Payer: MEDICARE

## 2019-10-18 PROCEDURE — 97530 THERAPEUTIC ACTIVITIES: CPT | Performed by: PHYSICAL THERAPIST

## 2019-10-18 PROCEDURE — 97161 PT EVAL LOW COMPLEX 20 MIN: CPT | Performed by: PHYSICAL THERAPIST

## 2019-10-18 PROCEDURE — 97110 THERAPEUTIC EXERCISES: CPT | Performed by: PHYSICAL THERAPIST

## 2019-10-18 ASSESSMENT — PAIN DESCRIPTION - FREQUENCY: FREQUENCY: INTERMITTENT

## 2019-10-18 ASSESSMENT — PAIN DESCRIPTION - PROGRESSION: CLINICAL_PROGRESSION: GRADUALLY WORSENING

## 2019-10-18 ASSESSMENT — PAIN SCALES - GENERAL: PAINLEVEL_OUTOF10: 7

## 2019-10-18 ASSESSMENT — PAIN DESCRIPTION - PAIN TYPE: TYPE: CHRONIC PAIN

## 2019-10-18 ASSESSMENT — PAIN DESCRIPTION - ORIENTATION: ORIENTATION: LEFT

## 2019-10-18 ASSESSMENT — PAIN DESCRIPTION - LOCATION: LOCATION: SHOULDER;NECK

## 2019-10-18 ASSESSMENT — PAIN DESCRIPTION - DESCRIPTORS: DESCRIPTORS: ACHING;DULL

## 2019-10-18 ASSESSMENT — PAIN - FUNCTIONAL ASSESSMENT: PAIN_FUNCTIONAL_ASSESSMENT: PREVENTS OR INTERFERES WITH MANY ACTIVE NOT PASSIVE ACTIVITIES

## 2019-10-21 RX ORDER — ATORVASTATIN CALCIUM 10 MG/1
10 TABLET, FILM COATED ORAL DAILY
Qty: 30 TABLET | Refills: 5 | Status: SHIPPED | OUTPATIENT
Start: 2019-10-21 | End: 2020-04-23

## 2019-10-23 ENCOUNTER — HOSPITAL ENCOUNTER (OUTPATIENT)
Dept: PHYSICAL THERAPY | Age: 84
Setting detail: THERAPIES SERIES
Discharge: HOME | End: 2019-10-23
Payer: MEDICARE

## 2019-10-23 PROCEDURE — 97110 THERAPEUTIC EXERCISES: CPT

## 2019-10-23 PROCEDURE — 97140 MANUAL THERAPY 1/> REGIONS: CPT

## 2019-10-25 ENCOUNTER — HOSPITAL ENCOUNTER (OUTPATIENT)
Dept: PHYSICAL THERAPY | Age: 84
Setting detail: THERAPIES SERIES
Discharge: HOME | End: 2019-10-25
Payer: MEDICARE

## 2019-10-25 PROCEDURE — 97110 THERAPEUTIC EXERCISES: CPT

## 2019-10-25 PROCEDURE — 97140 MANUAL THERAPY 1/> REGIONS: CPT

## 2019-10-29 RX ORDER — ATENOLOL 50 MG/1
TABLET ORAL
Qty: 90 TABLET | Refills: 1 | Status: SHIPPED | OUTPATIENT
Start: 2019-10-29 | End: 2020-06-08

## 2019-10-29 RX ORDER — LEVOTHYROXINE SODIUM 0.05 MG/1
TABLET ORAL
Qty: 90 TABLET | Refills: 1 | Status: SHIPPED | OUTPATIENT
Start: 2019-10-29 | End: 2020-06-08

## 2019-10-30 ENCOUNTER — HOSPITAL ENCOUNTER (OUTPATIENT)
Dept: PHYSICAL THERAPY | Age: 84
Setting detail: THERAPIES SERIES
Discharge: HOME | End: 2019-10-30
Payer: MEDICARE

## 2019-10-30 PROCEDURE — 97140 MANUAL THERAPY 1/> REGIONS: CPT

## 2019-10-30 PROCEDURE — 97110 THERAPEUTIC EXERCISES: CPT

## 2019-11-01 ENCOUNTER — HOSPITAL ENCOUNTER (OUTPATIENT)
Dept: PHYSICAL THERAPY | Age: 84
Setting detail: THERAPIES SERIES
Discharge: HOME | End: 2019-11-01
Payer: MEDICARE

## 2019-11-01 PROCEDURE — 97110 THERAPEUTIC EXERCISES: CPT

## 2019-11-01 PROCEDURE — 97140 MANUAL THERAPY 1/> REGIONS: CPT

## 2019-11-04 ENCOUNTER — HOSPITAL ENCOUNTER (OUTPATIENT)
Dept: PHYSICAL THERAPY | Age: 84
Setting detail: THERAPIES SERIES
Discharge: HOME | End: 2019-11-04
Payer: MEDICARE

## 2019-11-04 PROCEDURE — 97140 MANUAL THERAPY 1/> REGIONS: CPT

## 2019-11-04 PROCEDURE — 97110 THERAPEUTIC EXERCISES: CPT

## 2019-11-08 ENCOUNTER — HOSPITAL ENCOUNTER (OUTPATIENT)
Dept: PHYSICAL THERAPY | Age: 84
Setting detail: THERAPIES SERIES
Discharge: HOME | End: 2019-11-08
Payer: MEDICARE

## 2019-11-08 PROCEDURE — 97110 THERAPEUTIC EXERCISES: CPT

## 2019-11-08 PROCEDURE — 97140 MANUAL THERAPY 1/> REGIONS: CPT

## 2019-11-13 ENCOUNTER — APPOINTMENT (OUTPATIENT)
Dept: PHYSICAL THERAPY | Age: 84
End: 2019-11-13
Payer: MEDICARE

## 2020-01-10 RX ORDER — LOSARTAN POTASSIUM 100 MG/1
100 TABLET ORAL DAILY
Qty: 30 TABLET | Refills: 5 | Status: SHIPPED | OUTPATIENT
Start: 2020-01-10 | End: 2020-06-16

## 2020-03-25 PROBLEM — E03.9 HYPOTHYROID: Status: RESOLVED | Noted: 2017-12-07 | Resolved: 2020-03-24

## 2020-04-23 RX ORDER — ATORVASTATIN CALCIUM 10 MG/1
TABLET, FILM COATED ORAL
Qty: 30 TABLET | Refills: 5 | Status: SHIPPED | OUTPATIENT
Start: 2020-04-23 | End: 2020-11-13

## 2020-04-24 ENCOUNTER — VIRTUAL VISIT (OUTPATIENT)
Dept: INTERNAL MEDICINE CLINIC | Age: 85
End: 2020-04-24
Payer: MEDICARE

## 2020-04-24 PROCEDURE — 99442 PR PHYS/QHP TELEPHONE EVALUATION 11-20 MIN: CPT | Performed by: INTERNAL MEDICINE

## 2020-04-24 RX ORDER — AMLODIPINE BESYLATE 5 MG/1
TABLET ORAL
Qty: 30 TABLET | Refills: 5 | Status: SHIPPED | OUTPATIENT
Start: 2020-04-24 | End: 2020-10-16

## 2020-04-24 ASSESSMENT — PATIENT HEALTH QUESTIONNAIRE - PHQ9
1. LITTLE INTEREST OR PLEASURE IN DOING THINGS: 0
SUM OF ALL RESPONSES TO PHQ QUESTIONS 1-9: 0
SUM OF ALL RESPONSES TO PHQ QUESTIONS 1-9: 0
2. FEELING DOWN, DEPRESSED OR HOPELESS: 0
SUM OF ALL RESPONSES TO PHQ9 QUESTIONS 1 & 2: 0

## 2020-04-24 NOTE — PROGRESS NOTES
mentioned above. A caregiver was present when appropriate. Due to this being a TeleHealth encounter (During PKYBE-30 public health emergency), evaluation of the following organ systems was limited: Vitals/Constitutional/EENT/Resp/CV/GI//MS/Neuro/Skin/Heme-Lymph-Imm. Pursuant to the emergency declaration under the [address] [place] [address], [address] [place] [address] and the [employer] Act, this Virtual Visit was conducted with patient's (and/or legal guardian's) consent, to reduce the patient's risk of exposure to COVID-19 and provide necessary medical care. The patient (and/or legal guardian) has also been advised to contact this office for worsening conditions or problems, and seek emergency medical treatment and/or call 911 if deemed necessary. Patient identification was verified at the start of the visit: yes    Total time spent on this encounter: 15 minutes    Services were provided through a video synchronous discussion virtually to substitute for in-person clinic visit. Patient and provider were located at their individual homes. --Owen Hanks MD on 4/26/2020 at 4:46 PM    An electronic signature was used to authenticate this note.

## 2020-05-27 ENCOUNTER — TELEPHONE (OUTPATIENT)
Dept: INTERNAL MEDICINE CLINIC | Age: 85
End: 2020-05-27

## 2020-05-27 NOTE — TELEPHONE ENCOUNTER
Patient advised and will ice and elevate. She will report in and if not improving will schedule video visit.

## 2020-06-08 RX ORDER — ATENOLOL 50 MG/1
TABLET ORAL
Qty: 90 TABLET | Refills: 1 | Status: SHIPPED | OUTPATIENT
Start: 2020-06-08 | End: 2020-06-25

## 2020-06-08 RX ORDER — LEVOTHYROXINE SODIUM 0.05 MG/1
TABLET ORAL
Qty: 90 TABLET | Refills: 1 | Status: SHIPPED | OUTPATIENT
Start: 2020-06-08 | End: 2021-05-03

## 2020-06-16 RX ORDER — LOSARTAN POTASSIUM 100 MG/1
100 TABLET ORAL DAILY
Qty: 90 TABLET | Refills: 1 | Status: SHIPPED | OUTPATIENT
Start: 2020-06-16 | End: 2021-02-01

## 2020-06-16 NOTE — TELEPHONE ENCOUNTER
Last appointment: 04/24/2020  Next appointment: 07/24/2020  Last refill: 01/10/2020 # 30 with 5 refills

## 2020-06-19 ENCOUNTER — TELEPHONE (OUTPATIENT)
Dept: INTERNAL MEDICINE CLINIC | Age: 85
End: 2020-06-19

## 2020-06-19 NOTE — TELEPHONE ENCOUNTER
Magan Maharaj is calling for his Mother, Laurel Monique. Pau More is having issues with swelling in her right knee and both ankles. Magan Maharaj states she is having memory problems and is concerned she may Alzheimers. Magan Maharaj is asking to discuss this with concern with the Doctor discreetly. Magan Maharaj states he does not have the technology to do a virtual visit. Please contact Magan Maharaj to advise.

## 2020-06-25 ENCOUNTER — HOSPITAL ENCOUNTER (OUTPATIENT)
Dept: VASCULAR LAB | Age: 85
Discharge: HOME | End: 2020-06-25
Payer: MEDICARE

## 2020-06-25 ENCOUNTER — OFFICE VISIT (OUTPATIENT)
Dept: INTERNAL MEDICINE CLINIC | Age: 85
End: 2020-06-25
Payer: MEDICARE

## 2020-06-25 VITALS
BODY MASS INDEX: 31.91 KG/M2 | HEART RATE: 52 BPM | HEIGHT: 61 IN | OXYGEN SATURATION: 98 % | SYSTOLIC BLOOD PRESSURE: 138 MMHG | WEIGHT: 169 LBS | DIASTOLIC BLOOD PRESSURE: 70 MMHG | RESPIRATION RATE: 16 BRPM | TEMPERATURE: 97.9 F

## 2020-06-25 DIAGNOSIS — R41.3 MEMORY LOSS: ICD-10-CM

## 2020-06-25 LAB
A/G RATIO: 2 (ref 1.1–2.2)
ALBUMIN SERPL-MCNC: 4.5 G/DL (ref 3.4–5)
ALP BLD-CCNC: 86 U/L (ref 40–129)
ALT SERPL-CCNC: 11 U/L (ref 10–40)
ANION GAP SERPL CALCULATED.3IONS-SCNC: 12 MMOL/L (ref 3–16)
AST SERPL-CCNC: 22 U/L (ref 15–37)
BILIRUB SERPL-MCNC: 0.5 MG/DL (ref 0–1)
BUN BLDV-MCNC: 17 MG/DL (ref 7–20)
CALCIUM SERPL-MCNC: 9.9 MG/DL (ref 8.3–10.6)
CHLORIDE BLD-SCNC: 101 MMOL/L (ref 99–110)
CO2: 27 MMOL/L (ref 21–32)
CREAT SERPL-MCNC: 1.1 MG/DL (ref 0.6–1.2)
GFR AFRICAN AMERICAN: 57
GFR NON-AFRICAN AMERICAN: 47
GLOBULIN: 2.2 G/DL
GLUCOSE BLD-MCNC: 96 MG/DL (ref 70–99)
POTASSIUM SERPL-SCNC: 4.6 MMOL/L (ref 3.5–5.1)
SODIUM BLD-SCNC: 140 MMOL/L (ref 136–145)
TOTAL PROTEIN: 6.7 G/DL (ref 6.4–8.2)
TSH REFLEX: 2.51 UIU/ML (ref 0.27–4.2)
VITAMIN B-12: 452 PG/ML (ref 211–911)

## 2020-06-25 PROCEDURE — 99214 OFFICE O/P EST MOD 30 MIN: CPT | Performed by: INTERNAL MEDICINE

## 2020-06-25 PROCEDURE — 93970 EXTREMITY STUDY: CPT

## 2020-06-25 RX ORDER — ATENOLOL 25 MG/1
TABLET ORAL
Qty: 30 TABLET | Refills: 5 | Status: SHIPPED | OUTPATIENT
Start: 2020-06-25 | End: 2020-11-04 | Stop reason: ALTCHOICE

## 2020-06-25 ASSESSMENT — ENCOUNTER SYMPTOMS
SHORTNESS OF BREATH: 0
COUGH: 0
DIARRHEA: 0
SORE THROAT: 0
ABDOMINAL PAIN: 0
TROUBLE SWALLOWING: 0
RHINORRHEA: 0
NAUSEA: 0

## 2020-07-02 ENCOUNTER — TELEPHONE (OUTPATIENT)
Dept: ADMINISTRATIVE | Age: 85
End: 2020-07-02

## 2020-07-02 NOTE — TELEPHONE ENCOUNTER
Referral Request:    Patient's son called today to request a referral to a gerontologist , pt possibly has alzheimer's disease.       Phone number where patient can be reached between 8 AM and 6 PM is [phone]  Mickie Wilburn

## 2020-07-05 NOTE — TELEPHONE ENCOUNTER
Please let patient's son know the recent blood work regarding memory loss was normal.  I would like her to get the CAT scan of the head that was ordered. Assuming the Head CT shows no other issues, will likely suggest she see a Neurologist at [address] [place] [address] [address].  See referral.

## 2020-07-06 NOTE — TELEPHONE ENCOUNTER
If declining imaging, then please give them the information for [facility] neurology. [facility] can further evaluate and determine if imaging is indicated.

## 2020-07-06 NOTE — TELEPHONE ENCOUNTER
Spoke with son they are undecided about ct wanted mri instead and explained that the insurance may not approve mri until ct scan done. They are not sure they want any radiology done at this time.  Anaid Lindsey states she is just getting old and does not need testing

## 2020-07-22 NOTE — PROGRESS NOTES
[address] [facility] PRIMARY CARE  [address] [place] [address]  Dept: [phone]  Dept Fax: [phone]  Loc: [phone]     2020     [address] (:  1935) is a 82 y.o. female, here for evaluation of the following medical concerns:    Chief Complaint   Patient presents with    3 Month Follow-Up     Swelling of right foot no better. HPI   Patient is here with her son. She declined a referral to neurology regarding her memory loss. Her son continues to be concerned. We discussed next steps including imaging. Her labs were unremarkable as relates to memory loss. She also complains of a mass of her left upper arm at the antecubital fossa. She also complains of right ankle pain. Review of Systems   Psychiatric/Behavioral: Negative for dysphoric mood and sleep disturbance. The patient is not nervous/anxious. Prior to Visit Medications    Medication Sig Taking?  Authorizing Provider   diclofenac sodium (VOLTAREN) 1 % GEL Apply 4 g topically 4 times daily  Odalys Dale MD   atenolol (TENORMIN) 25 MG tablet TAKE 1 TABLET BY MOUTH EVERY DAY  Odalys Dale MD   losartan (COZAAR) 100 MG tablet TAKE 1 TABLET BY MOUTH DAILY  Angi Tesfaye MD   levothyroxine (SYNTHROID) 50 MCG tablet TAKE 1 TABLET BY MOUTH EVERY DAY  Odalys Dale MD   amLODIPine (NORVASC) 5 MG tablet TAKE 1 TABLET BY MOUTH EVERY DAY  Odalys Dale MD   atorvastatin (LIPITOR) 10 MG tablet TAKE 1 TABLET BY MOUTH EVERY DAY  Angi Tesfaye MD        Social History     Tobacco Use    Smoking status: Former Smoker    Smokeless tobacco: Never Used   Substance Use Topics    Alcohol use: No    Drug use: No        Vitals:    20 1256   BP: 120/74   Site: Right Upper Arm   Position: Sitting   Cuff Size: Medium Adult   Pulse: 60  Comment: Regular   Resp: 16   Temp: 98.7 °F (37.1 °C)   TempSrc: Oral SpO2: 97%  Comment: Room Air   Weight: 167 lb (75.8 kg)     Estimated body mass index is 31.55 kg/m² as calculated from the following:    Height as of 6/25/20: 5' 1\" (1.549 m). Weight as of this encounter: 167 lb (75.8 kg). Physical Exam  Constitutional:       General: She is not in acute distress. Appearance: Normal appearance. HENT:      Head: Atraumatic. Cardiovascular:      Rate and Rhythm: Regular rhythm. Pulmonary:      Effort: Pulmonary effort is normal.   Musculoskeletal:      Comments: Left anticubital fossa - possible lipoma  Mild swelling right foot   Neurological:      General: No focal deficit present. Psychiatric:      Comments: Pleasant interactive affect         ASSESSMENT/PLAN:  1. Chronic pain of right ankle  Advised Voltaren gel and an ankle wrap  - XR ANKLE RIGHT (MIN 3 VIEWS); Future    2. Memory loss  Likely Alzheimer's dementia. Very low [place] score last time. She declined seeing a neurologist.  Graciela Schmidt starting Aricept. Discussed potential side effects and told her to let me know how she feels she is doing.  - MRI BRAIN WO CONTRAST; Future to check for other underlying causes    3. Arm mass, left  - Lis Arenas MD, General Surgery, [facility]      Return in about 4 months (around 11/24/2020). Age and gender appropriate preventive health care needs were discussed with patient and addressed as needed.

## 2020-07-24 ENCOUNTER — OFFICE VISIT (OUTPATIENT)
Dept: INTERNAL MEDICINE CLINIC | Age: 85
End: 2020-07-24
Payer: MEDICARE

## 2020-07-24 VITALS
HEART RATE: 60 BPM | OXYGEN SATURATION: 97 % | DIASTOLIC BLOOD PRESSURE: 74 MMHG | WEIGHT: 167 LBS | TEMPERATURE: 98.7 F | BODY MASS INDEX: 31.55 KG/M2 | RESPIRATION RATE: 16 BRPM | SYSTOLIC BLOOD PRESSURE: 120 MMHG

## 2020-07-24 PROCEDURE — 99214 OFFICE O/P EST MOD 30 MIN: CPT | Performed by: INTERNAL MEDICINE

## 2020-07-24 RX ORDER — DONEPEZIL HYDROCHLORIDE 5 MG/1
5 TABLET, FILM COATED ORAL NIGHTLY
Qty: 30 TABLET | Refills: 2 | Status: SHIPPED | OUTPATIENT
Start: 2020-07-24 | End: 2020-08-27 | Stop reason: SINTOL

## 2020-08-03 ENCOUNTER — OFFICE VISIT (OUTPATIENT)
Dept: SURGERY | Age: 85
End: 2020-08-03
Payer: MEDICARE

## 2020-08-03 VITALS
SYSTOLIC BLOOD PRESSURE: 143 MMHG | WEIGHT: 170 LBS | BODY MASS INDEX: 32.1 KG/M2 | HEIGHT: 61 IN | DIASTOLIC BLOOD PRESSURE: 74 MMHG | TEMPERATURE: 96.9 F | HEART RATE: 52 BPM

## 2020-08-03 PROCEDURE — 99202 OFFICE O/P NEW SF 15 MIN: CPT | Performed by: SURGERY

## 2020-08-03 NOTE — PROGRESS NOTES
PATIENT NAME: Michael Gilbert     YOB: 1935     TODAY'S DATE: 8/3/2020    Reason for Visit:  Left arm mass     Requesting Physician:  Dr. Sil Hennessy:              The patient is a 82 y.o. female who presents with   Chief Complaint   Patient presents with    New Patient     arm mass, Ref: Dr Daniela Kim    Ms. Gregor Reyes is a pleasant 82year old female with a past medical history significant for HTN, HLD, OA, and CKD who presents with concerns for a left arm mass. The patient states that it has been present for some time. It is located in the left antecubital fossa. She states that she does have pain that shoots up and down her arm. She also notes shooting pains in her left shoulder. She believes that it may have increased in size lately. REVIEW OF SYSTEMS:  CONSTITUTIONAL:  negative  HEENT:  negative  RESPIRATORY:  negative  CARDIOVASCULAR:  negative  GASTROINTESTINAL:  negative  GENITOURINARY:  negative  HEMATOLOGIC/LYMPHATIC:  negative  NEUROLOGICAL:  Pins and needle pains of the left arm     PHYSICAL EXAM:  VITALS:  BP (!) 143/74   Pulse 52   Temp 96.9 °F (36.1 °C)   Ht 5' 1\" (1.549 m)   Wt 170 lb (77.1 kg)   LMP  (LMP Unknown)   BMI 32.12 kg/m²     CONSTITUTIONAL:  alert, no apparent distress  EYES:  sclera clear  ENT:  normocepalic, without obvious abnormality  NECK:  supple, symmetrical, trachea midline and no lymphadenopathy  LUNGS:  Symmetric chest rise, normal effort, on RA  CARDIOVASCULAR:  regular rate and rhythm   ABDOMEN: soft, non-distended   MUSCULOSKELETAL:  0+ pitting edema lower extremities, left antecubital fossa with small, soft, mobile mass   NEUROLOGIC:  Mental Status Exam:  Level of Alertness:   awake  Orientation:   person, place, time  SKIN:  normal skin color, texture, turgor    IMPRESSION/RECOMMENDATIONS:    Ms. Gregor Reyes is an 82year old who presents with left antecubital mass. On examination the mass is soft and mobile likely a lipoma.  Symptoms of pins and needles unlikely related to the mass. Offered surgery to remove the antecubital mass but she would like to hold off for now. She was instructed to return to clinic if any changes in exam.     Hansel Catie,  PGY4  8/3/20  5:25 PM  [phone]    I have seen, examined, and reviewed the patients chart. I agree with the residents assessment and have made appropriate changes.     Chang Longoria

## 2020-08-07 ENCOUNTER — HOSPITAL ENCOUNTER (OUTPATIENT)
Dept: MRI IMAGING | Age: 85
Discharge: HOME | End: 2020-08-07
Payer: MEDICARE

## 2020-08-07 ENCOUNTER — HOSPITAL ENCOUNTER (OUTPATIENT)
Dept: GENERAL RADIOLOGY | Age: 85
Discharge: HOME | End: 2020-08-07
Payer: MEDICARE

## 2020-08-07 PROCEDURE — 73610 X-RAY EXAM OF ANKLE: CPT

## 2020-08-07 PROCEDURE — 70551 MRI BRAIN STEM W/O DYE: CPT

## 2020-08-17 RX ORDER — DONEPEZIL HYDROCHLORIDE 5 MG/1
TABLET, FILM COATED ORAL
Qty: 30 TABLET | Refills: 2 | OUTPATIENT
Start: 2020-08-17

## 2020-10-16 RX ORDER — AMLODIPINE BESYLATE 5 MG/1
TABLET ORAL
Qty: 90 TABLET | Refills: 1 | Status: SHIPPED | OUTPATIENT
Start: 2020-10-16 | End: 2021-10-01

## 2020-10-16 NOTE — TELEPHONE ENCOUNTER
Last appointment: 7/24/2020  Next appointment: 11/25/2020  Last refill: 04/24/2020 # 30 with 5 refills

## 2020-10-28 ENCOUNTER — TELEPHONE (OUTPATIENT)
Dept: INTERNAL MEDICINE CLINIC | Age: 85
End: 2020-10-28

## 2020-10-28 NOTE — TELEPHONE ENCOUNTER
Moved appt up as it was scheduled for week  is out. Bonilla French states she is not sure how to discuss this in front of Bakari Romero. He may send questions through [facility]

## 2020-10-28 NOTE — TELEPHONE ENCOUNTER
It is generally used when people are having lots of mood swings related to dementia. We can discuss it at the next visit in November as it does have some side effects. They can make a sooner visit if desired.

## 2020-10-28 NOTE — TELEPHONE ENCOUNTER
Patient's son Bonilla French states that a relative told him that his mother was prescribed Seroquel for Alzheimer's. He is requesting to know if his mother would benefit from this. Please contact Bonilla French @ phone # provided.

## 2020-10-30 ENCOUNTER — TELEPHONE (OUTPATIENT)
Dept: INTERNAL MEDICINE CLINIC | Age: 85
End: 2020-10-30

## 2020-10-30 NOTE — TELEPHONE ENCOUNTER
Please tell patient's son Luc Arnold that I got his letter about Seroquel and we will address at upcoming visit.

## 2020-11-04 ENCOUNTER — OFFICE VISIT (OUTPATIENT)
Dept: INTERNAL MEDICINE CLINIC | Age: 85
End: 2020-11-04
Payer: MEDICARE

## 2020-11-04 VITALS
WEIGHT: 160 LBS | DIASTOLIC BLOOD PRESSURE: 64 MMHG | HEART RATE: 50 BPM | RESPIRATION RATE: 16 BRPM | BODY MASS INDEX: 30.23 KG/M2 | TEMPERATURE: 98.5 F | SYSTOLIC BLOOD PRESSURE: 120 MMHG | OXYGEN SATURATION: 97 %

## 2020-11-04 PROCEDURE — 93000 ELECTROCARDIOGRAM COMPLETE: CPT | Performed by: INTERNAL MEDICINE

## 2020-11-04 PROCEDURE — 99214 OFFICE O/P EST MOD 30 MIN: CPT | Performed by: INTERNAL MEDICINE

## 2020-11-04 RX ORDER — QUETIAPINE FUMARATE 25 MG/1
25 TABLET, FILM COATED ORAL EVERY EVENING
Qty: 30 TABLET | Refills: 0 | Status: SHIPPED | OUTPATIENT
Start: 2020-11-04 | End: 2020-11-29

## 2020-11-04 NOTE — PROGRESS NOTES
[address] [facility] PRIMARY CARE  [address] [facility] [address]  Dept: [phone]  Dept Fax: [phone]  Loc: [phone]     2020     Libertad Gilbert (:  1935) is a 82 y.o. female, here for evaluation of the following medical concerns:    Chief Complaint   Patient presents with    Follow-up     Episode yesterday where patient near syncopal episode, blood pressure was 105/61. Patient refused ER visit. HPI   Patient is here with her son to discuss possible addition of medication for agitation that she has at night. Her son sent me a note describing the situation which I will copy to the chart. The patient does not seem to remember these episodes or be bothered by them. In addition she had a near syncopal episode yesterday and was encouraged to go to the emergency room but she declined. He states that she passed out in the doorway of the bathroom. She remembers that she vomited. She had to be helped up by her brother. She denies incontinence at the time. She denies headache or irregular heartbeat. She is not a good historian due to her apparent memory loss. Review of Systems   Constitutional: Negative for fatigue and fever. HENT: Negative for rhinorrhea, sore throat and trouble swallowing. Eyes: Negative for visual disturbance. Respiratory: Negative for cough and shortness of breath. Cardiovascular: Negative for chest pain and palpitations. Gastrointestinal: Positive for vomiting. Negative for abdominal pain, diarrhea and nausea. Genitourinary: Negative for decreased urine volume, dysuria and frequency. Musculoskeletal: Negative for arthralgias and myalgias. Skin: Negative for rash. Allergic/Immunologic: Negative for immunocompromised state. Neurological: Positive for syncope. Negative for dizziness, numbness and headaches. Hematological: Does not bruise/bleed easily.    Psychiatric/Behavioral: Positive for agitation and behavioral problems. Negative for dysphoric mood and sleep disturbance. The patient is not nervous/anxious. Prior to Visit Medications    Medication Sig Taking? Authorizing Provider   amLODIPine (NORVASC) 5 MG tablet TAKE 1 TABLET BY MOUTH EVERY DAY  Odalys Dale MD   diclofenac sodium (VOLTAREN) 1 % GEL Apply 4 g topically 4 times daily  Radha Hameed MD   atenolol (TENORMIN) 25 MG tablet TAKE 1 TABLET BY MOUTH EVERY DAY  Abbie Otilio Crigler, MD   losartan (COZAAR) 100 MG tablet TAKE 1 TABLET BY MOUTH DAILY  Abbie Otilio Crigler, MD   levothyroxine (SYNTHROID) 50 MCG tablet TAKE 1 TABLET BY MOUTH EVERY DAY  Odalys Dale MD   atorvastatin (LIPITOR) 10 MG tablet TAKE 1 TABLET BY MOUTH EVERY DAY  Radha Hameed MD        Social History     Tobacco Use    Smoking status: Former Smoker    Smokeless tobacco: Never Used   Substance Use Topics    Alcohol use: No    Drug use: No        Vitals:    11/04/20 1141   BP: 120/64   Site: Right Upper Arm   Position: Sitting   Cuff Size: Medium Adult   Pulse: 50  Comment: Regular   Resp: 16   Temp: 98.5 °F (36.9 °C)   TempSrc: Oral   SpO2: 97%  Comment: Room AIr   Weight: 160 lb (72.6 kg)     Estimated body mass index is 30.23 kg/m² as calculated from the following:    Height as of 8/3/20: 5' 1\" (1.549 m). Weight as of this encounter: 160 lb (72.6 kg). Physical Exam  Vitals signs and nursing note reviewed. Constitutional:       General: She is not in acute distress. Appearance: She is well-developed. HENT:      Head: Normocephalic and atraumatic. Right Ear: External ear normal.      Left Ear: External ear normal.      Nose: Nose normal.   Eyes:      General: No scleral icterus. Pupils: Pupils are equal, round, and reactive to light. Neck:      Thyroid: No thyromegaly. Cardiovascular:      Rate and Rhythm: Regular rhythm. Bradycardia present. Heart sounds:  No murmur. Pulmonary:      Effort: No respiratory distress. Breath sounds: Normal breath sounds. No wheezing or rales. Abdominal:      General: Bowel sounds are normal. There is no distension. Palpations: Abdomen is soft. Tenderness: There is no abdominal tenderness. Musculoskeletal: Normal range of motion. Lymphadenopathy:      Cervical: No cervical adenopathy. Skin:     General: Skin is warm and dry. Neurological:      Mental Status: She is alert. Cranial Nerves: No cranial nerve deficit. Sensory: No sensory deficit. Coordination: Coordination normal.   Psychiatric:         Behavior: Behavior normal.      Comments: Unable to remember yesterday's events         ASSESSMENT/PLAN:  1. Memory loss  Patient has declined evaluation by a specialist or additional imaging for what appears to be significant dementia. Does not seem to have good insight into her problem. I advised her she should not be driving which she was not happy to hear.-Told her she can get a driving evaluation if she disagreed. Discussed with her and her son Servivianal might improve her agitation at night but she would have to be willing to take it. I did provide a prescription and asked him to let me know the results. Discussed potential side effects including weight gain and hyperglycemia and heart rhythm abnormalities. - QUEtiapine (SEROQUEL) 25 MG tablet; Take 1 tablet by mouth every evening  Dispense: 30 tablet; Refill: 0    2. Syncope, unspecified syncope type  Unclear if this was a syncopal episode or mechanical fall. Due to slow heart rate on EKG, will discontinue atenolol.  - CBC; Future  - EKG 12 Lead    3. Abnormal bone density screening    - DEXA BONE DENSITY AXIAL SKELETON; Future    4. Essential hypertension  Expect control will be adequate on losartan and amlodipine alone. 5. Hyperlipidemia, unspecified hyperlipidemia type  Continue statin  - LIPID PANEL;  Future  - Comprehensive Metabolic Panel, Fasting; Future    6. Hypothyroidism, unspecified type  Check labs on levothyroxine  - TSH without Reflex; Future    7. Screening for unspecified condition    - HEMOGLOBIN A1C; Future      Return in about 3 months (around 2/4/2021). Age and gender appropriate preventive health care needs were discussed with patient and addressed as needed.

## 2020-11-06 ENCOUNTER — HOSPITAL ENCOUNTER (OUTPATIENT)
Dept: GENERAL RADIOLOGY | Age: 85
Discharge: HOME | End: 2020-11-06
Payer: MEDICARE

## 2020-11-06 DIAGNOSIS — E78.5 HYPERLIPIDEMIA, UNSPECIFIED HYPERLIPIDEMIA TYPE: ICD-10-CM

## 2020-11-06 DIAGNOSIS — I13.0 HYPERTENSIVE HEART AND CHRONIC KIDNEY DISEASE WITH HEART FAILURE AND STAGE 1 THROUGH STAGE 4 CHRONIC KIDNEY DISEASE, OR UNSPECIFIED CHRONIC KIDNEY DISEASE (HCC): ICD-10-CM

## 2020-11-06 DIAGNOSIS — R55 SYNCOPE, UNSPECIFIED SYNCOPE TYPE: ICD-10-CM

## 2020-11-06 DIAGNOSIS — Z13.9 SCREENING FOR UNSPECIFIED CONDITION: ICD-10-CM

## 2020-11-06 DIAGNOSIS — M79.89 LEG SWELLING: ICD-10-CM

## 2020-11-06 DIAGNOSIS — E03.9 HYPOTHYROIDISM, UNSPECIFIED TYPE: ICD-10-CM

## 2020-11-06 LAB
A/G RATIO: 2 (ref 1.1–2.2)
ALBUMIN SERPL-MCNC: 4.3 G/DL (ref 3.4–5)
ALP BLD-CCNC: 91 U/L (ref 40–129)
ALT SERPL-CCNC: 12 U/L (ref 10–40)
ANION GAP SERPL CALCULATED.3IONS-SCNC: 14 MMOL/L (ref 3–16)
AST SERPL-CCNC: 21 U/L (ref 15–37)
BILIRUB SERPL-MCNC: 0.5 MG/DL (ref 0–1)
BUN BLDV-MCNC: 29 MG/DL (ref 7–20)
CALCIUM SERPL-MCNC: 9.6 MG/DL (ref 8.3–10.6)
CHLORIDE BLD-SCNC: 99 MMOL/L (ref 99–110)
CHOLESTEROL, TOTAL: 176 MG/DL (ref 0–199)
CO2: 24 MMOL/L (ref 21–32)
CREAT SERPL-MCNC: 1.3 MG/DL (ref 0.6–1.2)
GFR AFRICAN AMERICAN: 47
GFR NON-AFRICAN AMERICAN: 39
GLOBULIN: 2.1 G/DL
GLUCOSE FASTING: 96 MG/DL (ref 70–99)
HCT VFR BLD CALC: 41.1 % (ref 36–48)
HDLC SERPL-MCNC: 68 MG/DL (ref 40–60)
HEMOGLOBIN: 13.4 G/DL (ref 12–16)
LDL CHOLESTEROL CALCULATED: 80 MG/DL
MCH RBC QN AUTO: 28.7 PG (ref 26–34)
MCHC RBC AUTO-ENTMCNC: 32.6 G/DL (ref 31–36)
MCV RBC AUTO: 87.9 FL (ref 80–100)
PDW BLD-RTO: 14.8 % (ref 12.4–15.4)
PLATELET # BLD: NORMAL K/UL (ref 135–450)
PLATELET SLIDE REVIEW: NORMAL
PMV BLD AUTO: NORMAL FL (ref 5–10.5)
POTASSIUM SERPL-SCNC: 4.5 MMOL/L (ref 3.5–5.1)
PRO-BNP: 221 PG/ML (ref 0–449)
RBC # BLD: 4.67 M/UL (ref 4–5.2)
SLIDE REVIEW: NORMAL
SODIUM BLD-SCNC: 137 MMOL/L (ref 136–145)
TOTAL PROTEIN: 6.4 G/DL (ref 6.4–8.2)
TRIGL SERPL-MCNC: 139 MG/DL (ref 0–150)
TSH SERPL DL<=0.05 MIU/L-ACNC: 2.31 UIU/ML (ref 0.27–4.2)
VLDLC SERPL CALC-MCNC: 28 MG/DL
WBC # BLD: 9.4 K/UL (ref 4–11)

## 2020-11-06 PROCEDURE — 77080 DXA BONE DENSITY AXIAL: CPT

## 2020-11-07 LAB
ESTIMATED AVERAGE GLUCOSE: 119.8 MG/DL
HBA1C MFR BLD: 5.8 %

## 2020-11-08 ASSESSMENT — ENCOUNTER SYMPTOMS
SORE THROAT: 0
TROUBLE SWALLOWING: 0
VOMITING: 1
SHORTNESS OF BREATH: 0
COUGH: 0
NAUSEA: 0
RHINORRHEA: 0
DIARRHEA: 0
ABDOMINAL PAIN: 0

## 2020-11-13 RX ORDER — ATORVASTATIN CALCIUM 10 MG/1
TABLET, FILM COATED ORAL
Qty: 90 TABLET | Refills: 1 | Status: SHIPPED | OUTPATIENT
Start: 2020-11-13 | End: 2021-05-06

## 2020-11-30 NOTE — TELEPHONE ENCOUNTER
Last appointment: 11/4/2020  Next appointment: 2/5/2021  Last refill: Requesting 90 day supply per patient request.      Left message for patient's son, Manual to return call.

## 2020-11-30 NOTE — TELEPHONE ENCOUNTER
Patient's son was supposed to let me know if medicine was effective without major side effects. Haven't heard so please find out before I refill it.

## 2020-12-01 RX ORDER — QUETIAPINE FUMARATE 25 MG/1
TABLET, FILM COATED ORAL
Qty: 90 TABLET | Refills: 0 | OUTPATIENT
Start: 2020-12-01

## 2020-12-01 NOTE — TELEPHONE ENCOUNTER
Oziel De Oliveira states that patient took Seroquel a few times, and didn't like the way it made her feel. He said the medication caused her to sleep all night, and it was a great help to him. She refuses to take this medication any longer. He states the refill isn't necessary as she has most of it left.

## 2021-02-01 DIAGNOSIS — I10 ESSENTIAL HYPERTENSION: ICD-10-CM

## 2021-02-01 RX ORDER — LOSARTAN POTASSIUM 100 MG/1
TABLET ORAL
Qty: 90 TABLET | Refills: 1 | Status: SHIPPED | OUTPATIENT
Start: 2021-02-01 | End: 2021-07-29

## 2021-02-01 NOTE — TELEPHONE ENCOUNTER
Last appointment: 11/4/2020  Next appointment: 2/22/2021  Last refill: 06/16/2020 # 90 with one refill

## 2021-02-22 ENCOUNTER — OFFICE VISIT (OUTPATIENT)
Dept: INTERNAL MEDICINE CLINIC | Age: 86
End: 2021-02-22
Payer: MEDICARE

## 2021-02-22 VITALS
TEMPERATURE: 98.2 F | HEIGHT: 62 IN | HEART RATE: 62 BPM | SYSTOLIC BLOOD PRESSURE: 110 MMHG | BODY MASS INDEX: 29.63 KG/M2 | OXYGEN SATURATION: 98 % | WEIGHT: 161 LBS | RESPIRATION RATE: 16 BRPM | DIASTOLIC BLOOD PRESSURE: 78 MMHG

## 2021-02-22 DIAGNOSIS — E55.9 VITAMIN D DEFICIENCY: ICD-10-CM

## 2021-02-22 DIAGNOSIS — E78.5 HYPERLIPIDEMIA, UNSPECIFIED HYPERLIPIDEMIA TYPE: ICD-10-CM

## 2021-02-22 DIAGNOSIS — E03.9 HYPOTHYROIDISM, UNSPECIFIED TYPE: ICD-10-CM

## 2021-02-22 DIAGNOSIS — I10 ESSENTIAL HYPERTENSION: Primary | ICD-10-CM

## 2021-02-22 PROCEDURE — 99214 OFFICE O/P EST MOD 30 MIN: CPT | Performed by: INTERNAL MEDICINE

## 2021-02-22 ASSESSMENT — PATIENT HEALTH QUESTIONNAIRE - PHQ9
SUM OF ALL RESPONSES TO PHQ9 QUESTIONS 1 & 2: 0
SUM OF ALL RESPONSES TO PHQ QUESTIONS 1-9: 0

## 2021-02-22 ASSESSMENT — ENCOUNTER SYMPTOMS
ABDOMINAL PAIN: 0
SHORTNESS OF BREATH: 0

## 2021-02-22 NOTE — PATIENT INSTRUCTIONS
One Rachelle George covid vaccine    Check home weight today and weekly.  And if losing more weight without explanation, let me know and will consider imaging    Labs with next visit
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
## 2021-05-03 RX ORDER — LEVOTHYROXINE SODIUM 0.05 MG/1
TABLET ORAL
Qty: 90 TABLET | Refills: 1 | Status: SHIPPED | OUTPATIENT
Start: 2021-05-03 | End: 2021-11-08

## 2021-05-06 RX ORDER — ATORVASTATIN CALCIUM 10 MG/1
TABLET, FILM COATED ORAL
Qty: 90 TABLET | Refills: 1 | Status: SHIPPED | OUTPATIENT
Start: 2021-05-06 | End: 2022-05-18

## 2021-05-14 ENCOUNTER — TELEPHONE (OUTPATIENT)
Dept: INTERNAL MEDICINE CLINIC | Age: 86
End: 2021-05-14

## 2021-05-14 DIAGNOSIS — H35.30 MACULAR DEGENERATION, UNSPECIFIED LATERALITY, UNSPECIFIED TYPE: Primary | ICD-10-CM

## 2021-05-14 NOTE — TELEPHONE ENCOUNTER
Referral faxed and Gave patient the information and patient verbalized understanding.   Note completed

## 2021-05-14 NOTE — TELEPHONE ENCOUNTER
Patients son called stating that the patient need a referral to [facility] for possible Macular degeneration which was stated by Dr Antony Robledo at her appointment on  5-12-21. Patient has an appointment with Dr Ambrocio Ojeda at [facility] on 5-. However in order to be seen here she would cherry this referral before hand. Please call Sonia Goetz back at the number provided if this is something that can be done.

## 2021-05-20 ENCOUNTER — OFFICE VISIT (OUTPATIENT)
Dept: INTERNAL MEDICINE CLINIC | Age: 86
End: 2021-05-20
Payer: MEDICARE

## 2021-05-20 VITALS
SYSTOLIC BLOOD PRESSURE: 138 MMHG | TEMPERATURE: 98 F | OXYGEN SATURATION: 97 % | RESPIRATION RATE: 16 BRPM | WEIGHT: 164 LBS | HEART RATE: 70 BPM | BODY MASS INDEX: 30.18 KG/M2 | DIASTOLIC BLOOD PRESSURE: 68 MMHG | HEIGHT: 62 IN

## 2021-05-20 DIAGNOSIS — Z00.00 ROUTINE GENERAL MEDICAL EXAMINATION AT A HEALTH CARE FACILITY: Primary | ICD-10-CM

## 2021-05-20 DIAGNOSIS — E03.9 HYPOTHYROIDISM, UNSPECIFIED TYPE: ICD-10-CM

## 2021-05-20 DIAGNOSIS — I10 ESSENTIAL HYPERTENSION: ICD-10-CM

## 2021-05-20 DIAGNOSIS — E78.5 HYPERLIPIDEMIA, UNSPECIFIED HYPERLIPIDEMIA TYPE: ICD-10-CM

## 2021-05-20 PROCEDURE — G0439 PPPS, SUBSEQ VISIT: HCPCS | Performed by: INTERNAL MEDICINE

## 2021-05-20 SDOH — ECONOMIC STABILITY: FOOD INSECURITY: WITHIN THE PAST 12 MONTHS, THE FOOD YOU BOUGHT JUST DIDN'T LAST AND YOU DIDN'T HAVE MONEY TO GET MORE.: NEVER TRUE

## 2021-05-20 ASSESSMENT — PATIENT HEALTH QUESTIONNAIRE - PHQ9
1. LITTLE INTEREST OR PLEASURE IN DOING THINGS: 0
SUM OF ALL RESPONSES TO PHQ9 QUESTIONS 1 & 2: 0
SUM OF ALL RESPONSES TO PHQ QUESTIONS 1-9: 0

## 2021-05-20 ASSESSMENT — LIFESTYLE VARIABLES: HOW OFTEN DO YOU HAVE A DRINK CONTAINING ALCOHOL: 0
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
## 2021-05-20 NOTE — PATIENT INSTRUCTIONS
Tdap and Shingrix at pharmacy recommended  --  Labs today    Check home blood pressure and let me know if over 150 for top number  Personalized Preventive Plan for Wayne Shah - 5/20/2021  Medicare offers a range of preventive health benefits. Some of the tests and screenings are paid in full while other may be subject to a deductible, co-insurance, and/or copay. Some of these benefits include a comprehensive review of your medical history including lifestyle, illnesses that may run in your family, and various assessments and screenings as appropriate. After reviewing your medical record and screening and assessments performed today your provider may have ordered immunizations, labs, imaging, and/or referrals for you. A list of these orders (if applicable) as well as your Preventive Care list are included within your After Visit Summary for your review. Other Preventive Recommendations:    · A preventive eye exam performed by an eye specialist is recommended every 1-2 years to screen for glaucoma; cataracts, macular degeneration, and other eye disorders. · A preventive dental visit is recommended every 6 months. · Try to get at least 150 minutes of exercise per week or 10,000 steps per day on a pedometer . · Order or download the FREE \"Exercise & Physical Activity: Your Everyday Guide\" from [employer]. Call 7-444.310.9661 or search [employer] online. · You need 4993-6119 mg of calcium and 8202-4061 IU of vitamin D per day. It is possible to meet your calcium requirement with diet alone, but a vitamin D supplement is usually necessary to meet this goal.  · When exposed to the sun, use a sunscreen that protects against both UVA and UVB radiation with an SPF of 30 or greater. Reapply every 2 to 3 hours or after sweating, drying off with a towel, or swimming. · Always wear a seat belt when traveling in a car.  Always wear a helmet when riding a bicycle or motorcycle.

## 2021-05-21 RX ORDER — MELATONIN
1 DAILY
Qty: 90 TABLET | Refills: 2 | Status: SHIPPED | OUTPATIENT
Start: 2021-05-21 | End: 2022-02-10

## 2021-07-29 DIAGNOSIS — I10 ESSENTIAL HYPERTENSION: ICD-10-CM

## 2021-07-29 RX ORDER — LOSARTAN POTASSIUM 100 MG/1
TABLET ORAL
Qty: 90 TABLET | Refills: 1 | Status: SHIPPED | OUTPATIENT
Start: 2021-07-29 | End: 2022-08-30

## 2021-10-01 RX ORDER — AMLODIPINE BESYLATE 5 MG/1
TABLET ORAL
Qty: 90 TABLET | Refills: 1 | Status: SHIPPED | OUTPATIENT
Start: 2021-10-01 | End: 2022-03-31

## 2021-11-08 RX ORDER — LEVOTHYROXINE SODIUM 0.05 MG/1
TABLET ORAL
Qty: 90 TABLET | Refills: 1 | Status: SHIPPED | OUTPATIENT
Start: 2021-11-08 | End: 2022-09-16 | Stop reason: SDUPTHER

## 2021-11-22 ENCOUNTER — OFFICE VISIT (OUTPATIENT)
Dept: INTERNAL MEDICINE CLINIC | Age: 86
End: 2021-11-22
Payer: MEDICARE

## 2021-11-22 VITALS
HEIGHT: 63 IN | OXYGEN SATURATION: 98 % | SYSTOLIC BLOOD PRESSURE: 118 MMHG | HEART RATE: 74 BPM | WEIGHT: 157.4 LBS | BODY MASS INDEX: 27.89 KG/M2 | DIASTOLIC BLOOD PRESSURE: 60 MMHG

## 2021-11-22 DIAGNOSIS — N18.30 STAGE 3 CHRONIC KIDNEY DISEASE, UNSPECIFIED WHETHER STAGE 3A OR 3B CKD (HCC): ICD-10-CM

## 2021-11-22 DIAGNOSIS — I10 ESSENTIAL HYPERTENSION: Primary | ICD-10-CM

## 2021-11-22 DIAGNOSIS — E78.5 HYPERLIPIDEMIA, UNSPECIFIED HYPERLIPIDEMIA TYPE: ICD-10-CM

## 2021-11-22 DIAGNOSIS — E03.9 HYPOTHYROIDISM, UNSPECIFIED TYPE: ICD-10-CM

## 2021-11-22 PROCEDURE — 99214 OFFICE O/P EST MOD 30 MIN: CPT | Performed by: INTERNAL MEDICINE

## 2021-11-22 PROCEDURE — G0008 ADMIN INFLUENZA VIRUS VAC: HCPCS | Performed by: INTERNAL MEDICINE

## 2021-11-22 PROCEDURE — 90694 VACC AIIV4 NO PRSRV 0.5ML IM: CPT | Performed by: INTERNAL MEDICINE

## 2021-11-22 NOTE — PATIENT INSTRUCTIONS
[employer] or Ana April at pharmacy  --  Flu shot today      Labs    Renal ultrasound if GFR declines further
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
## 2021-11-29 ASSESSMENT — ENCOUNTER SYMPTOMS: SHORTNESS OF BREATH: 0

## 2022-02-10 RX ORDER — VITAMIN B COMPLEX
TABLET ORAL
Qty: 90 TABLET | Refills: 2 | Status: SHIPPED | OUTPATIENT
Start: 2022-02-10

## 2022-02-10 NOTE — TELEPHONE ENCOUNTER
Medication:   Requested Prescriptions     Pending Prescriptions Disp Refills    Vitamin D (CHOLECALCIFEROL) 25 MCG (1000 UT) TABS tablet [Pharmacy Med Name: VITAMIN D3 1,000 UNIT TABLET] 90 tablet 2     Sig: TAKE 1 TABLET BY MOUTH EVERY DAY       Last Filled:  11/13/2021    Patient Phone Number: [phone] (home)     Last appt: 11/22/2021   Next appt: 5/23/2022    Last Labs DM:   Lab Results   Component Value Date    VITD25 23.0 05/20/2021    VITD25 20 05/05/2010

## 2022-03-31 RX ORDER — AMLODIPINE BESYLATE 5 MG/1
TABLET ORAL
Qty: 90 TABLET | Refills: 1 | Status: SHIPPED | OUTPATIENT
Start: 2022-03-31 | End: 2022-08-22

## 2022-05-18 RX ORDER — ATORVASTATIN CALCIUM 10 MG/1
TABLET, FILM COATED ORAL
Qty: 90 TABLET | Refills: 1 | Status: SHIPPED | OUTPATIENT
Start: 2022-05-18

## 2022-05-23 ENCOUNTER — OFFICE VISIT (OUTPATIENT)
Dept: INTERNAL MEDICINE CLINIC | Age: 87
End: 2022-05-23
Payer: MEDICARE

## 2022-05-23 VITALS
WEIGHT: 156.4 LBS | OXYGEN SATURATION: 98 % | BODY MASS INDEX: 28.78 KG/M2 | HEIGHT: 62 IN | DIASTOLIC BLOOD PRESSURE: 62 MMHG | HEART RATE: 65 BPM | SYSTOLIC BLOOD PRESSURE: 120 MMHG

## 2022-05-23 DIAGNOSIS — Z00.00 MEDICARE ANNUAL WELLNESS VISIT, SUBSEQUENT: Primary | ICD-10-CM

## 2022-05-23 DIAGNOSIS — G30.1 LATE ONSET ALZHEIMER'S DEMENTIA WITHOUT BEHAVIORAL DISTURBANCE (HCC): ICD-10-CM

## 2022-05-23 DIAGNOSIS — M25.511 RIGHT SHOULDER PAIN, UNSPECIFIED CHRONICITY: ICD-10-CM

## 2022-05-23 DIAGNOSIS — E78.5 HYPERLIPIDEMIA, UNSPECIFIED HYPERLIPIDEMIA TYPE: ICD-10-CM

## 2022-05-23 DIAGNOSIS — F02.80 LATE ONSET ALZHEIMER'S DEMENTIA WITHOUT BEHAVIORAL DISTURBANCE (HCC): ICD-10-CM

## 2022-05-23 DIAGNOSIS — E03.9 HYPOTHYROIDISM, UNSPECIFIED TYPE: ICD-10-CM

## 2022-05-23 DIAGNOSIS — I10 ESSENTIAL HYPERTENSION: ICD-10-CM

## 2022-05-23 PROBLEM — G30.9 ALZHEIMER'S DEMENTIA (HCC): Status: ACTIVE | Noted: 2022-05-23

## 2022-05-23 PROCEDURE — G0439 PPPS, SUBSEQ VISIT: HCPCS | Performed by: INTERNAL MEDICINE

## 2022-05-23 SDOH — ECONOMIC STABILITY: FOOD INSECURITY: WITHIN THE PAST 12 MONTHS, YOU WORRIED THAT YOUR FOOD WOULD RUN OUT BEFORE YOU GOT MONEY TO BUY MORE.: NEVER TRUE

## 2022-05-23 SDOH — ECONOMIC STABILITY: FOOD INSECURITY: WITHIN THE PAST 12 MONTHS, THE FOOD YOU BOUGHT JUST DIDN'T LAST AND YOU DIDN'T HAVE MONEY TO GET MORE.: NEVER TRUE

## 2022-05-23 ASSESSMENT — PATIENT HEALTH QUESTIONNAIRE - PHQ9
2. FEELING DOWN, DEPRESSED OR HOPELESS: 0
SUM OF ALL RESPONSES TO PHQ9 QUESTIONS 1 & 2: 0
SUM OF ALL RESPONSES TO PHQ QUESTIONS 1-9: 0
1. LITTLE INTEREST OR PLEASURE IN DOING THINGS: 0
SUM OF ALL RESPONSES TO PHQ QUESTIONS 1-9: 0

## 2022-05-23 ASSESSMENT — SOCIAL DETERMINANTS OF HEALTH (SDOH): HOW HARD IS IT FOR YOU TO PAY FOR THE VERY BASICS LIKE FOOD, HOUSING, MEDICAL CARE, AND HEATING?: NOT HARD AT ALL

## 2022-05-23 ASSESSMENT — LIFESTYLE VARIABLES: HOW OFTEN DO YOU HAVE A DRINK CONTAINING ALCOHOL: NEVER

## 2022-05-23 NOTE — PATIENT INSTRUCTIONS
Consider knee xray if symptoms worsen  Call the dentist  Change to safer skecher style shoes  --  shingrix and tdap shots at pharmacy  covid booster shot also due    Fasting labs    Ultrasound - renal  [id number]    Tylenol up to 1000 mg every 8 hours     Consider seeing shoulder orthopedist    --  Personalized Preventive Plan for Manuel Trevino - 5/23/2022  Medicare offers a range of preventive health benefits. Some of the tests and screenings are paid in full while other may be subject to a deductible, co-insurance, and/or copay. Some of these benefits include a comprehensive review of your medical history including lifestyle, illnesses that may run in your family, and various assessments and screenings as appropriate. After reviewing your medical record and screening and assessments performed today your provider may have ordered immunizations, labs, imaging, and/or referrals for you. A list of these orders (if applicable) as well as your Preventive Care list are included within your After Visit Summary for your review. Other Preventive Recommendations:    · A preventive eye exam performed by an eye specialist is recommended every 1-2 years to screen for glaucoma; cataracts, macular degeneration, and other eye disorders. · A preventive dental visit is recommended every 6 months. · Try to get at least 150 minutes of exercise per week or 10,000 steps per day on a pedometer . · Order or download the FREE \"Exercise & Physical Activity: Your Everyday Guide\" from [employer]. Call 6-154.754.9937 or search [employer] online. · You need 5349-0448 mg of calcium and 8018-1783 IU of vitamin D per day. It is possible to meet your calcium requirement with diet alone, but a vitamin D supplement is usually necessary to meet this goal.  · When exposed to the sun, use a sunscreen that protects against both UVA and UVB radiation with an SPF of 30 or greater.  Reapply every 2 to 3 hours or after sweating, drying off with a towel, or swimming. · Always wear a seat belt when traveling in a car. Always wear a helmet when riding a bicycle or motorcycle.

## 2022-05-23 NOTE — PROGRESS NOTES
Medicare Annual Wellness Visit    Poncho Hadley is here for Medicare AWV    Assessment & Plan   Medicare annual wellness visit, subsequent  Essential hypertension  Well-controlled on Amlodipine and Losartan  -     Comprehensive Metabolic Panel; Future  Hypothyroidism, unspecified type  Check labs on levothyroxine  -     TSH; Future  Hyperlipidemia, unspecified hyperlipidemia type  Check labs on atorvastatin  -     Lipid Panel; Future  Right shoulder pain, unspecified chronicity  Can take Tylenol for pain. Avoid NSAIDs due to chronic kidney disease.  -     Misti Jimenez MD, Orthopedic Surgery, [facility]  Late onset Alzheimer's dementia without behavioral disturbance ([facility] [address]  Symptoms are stable off medication. Patient consented and interested in further pursuing neurology visits etc.      Recommendations for Preventive Services Due: see orders and patient instructions/AVS.  Recommended screening schedule for the next 5-10 years is provided to the patient in written form: see Patient Instructions/AVS.     Return in 6 months (on 11/23/2022) for Medicare Annual Wellness Visit in 1 year. Subjective    Patient is here for annual wellness visit and routine health. She complains of right shoulder pain that is interfering with her ability to lift her arm up in front. Patient's complete Health Risk Assessment and screening values have been reviewed and are found in Flowsheets. The following problems were reviewed today and where indicated follow up appointments were made and/or referrals ordered. Positive Risk Factor Screenings with Interventions:    Fall Risk:  Do you feel unsteady or are you worried about falling? : (!) yes  2 or more falls in past year?: no  Fall with injury in past year?: no     Fall Risk Interventions:    · has tripped over shoes on front steps, feels unsteady on her feet-, she will change to safer walking shoes    Cognitive:   Words recalled: 0 Words Recalled  Total Score Interpretation: Abnormal Mini-Cog    Cognitive Impairment Interventions:  · Patient declines any further evaluation/treatment for cognitive impairment. Not getting worse, can do her ADL's             General Health and ACP:  General  In general, how would you say your health is?: Fair  In the past 7 days, have you experienced any of the following: New or Increased Pain, New or Increased Fatigue, Loneliness, Social Isolation, Stress or Anger?: (!) Yes  Select all that apply: (!) New or Increased Pain (r knee pain)  Do you get the social and emotional support that you need?: Yes  Do you have a Living Will?: Yes    Advance Directives     Power of  Living Will ACP-Advance Directive ACP-Power of     Not on File Not on File Not on File Filed      General Health Risk Interventions:  · Pain issues: right knee pain. previously examined with outside orthopedist    Health Habits/Nutrition:     Physical Activity: Sufficiently Active    Days of Exercise per Week: 7 days    Minutes of Exercise per Session: 60 min     Have you lost any weight without trying in the past 3 months?: No  Body mass index: (!) 28.24  Have you seen the dentist within the past year?: (!) No    Health Habits/Nutrition Interventions:  · Dental exam overdue:  patient encouraged to make appointment with his/her dentist    Hearing/Vision:  Do you or your family notice any trouble with your hearing that hasn't been managed with hearing aids?: No  Do you have difficulty driving, watching TV, or doing any of your daily activities because of your eyesight?: (!) Yes  Have you had an eye exam within the past year?: Yes  No exam data present    Hearing/Vision Interventions:  · Vision concerns:  sees eye dr for retinal? disease            Objective   Vitals:    05/23/22 1004   BP: 120/62   Pulse: 65   SpO2: 98%   Weight: 156 lb 6.4 oz (70.9 kg)   Height: 5' 2.4\" (1.585 m)      Body mass index is 28.24 kg/m². Patient is in no acute distress. Head is normocephalic atraumatic. Heart is regular rate and rhythm. Lungs are clear to auscultation. Calves are without edema. Gait is overall intact. Patient is pleasant and interactive. She gets pain when she lifts her right arm to 90 degrees in front. Otherwise right shoulder range of motion is close to normal.        Allergies   Allergen Reactions    Aspirin     Nizatidine     Penicillins Hives     Prior to Visit Medications    Medication Sig Taking?  Authorizing Provider   atorvastatin (LIPITOR) 10 MG tablet TAKE 1 TABLET BY MOUTH EVERY DAY Yes Odalys Dale MD   amLODIPine (NORVASC) 5 MG tablet TAKE 1 TABLET BY MOUTH EVERY DAY Yes Magno Vann MD   Vitamin D (CHOLECALCIFEROL) 25 MCG (1000 UT) TABS tablet TAKE 1 TABLET BY MOUTH EVERY DAY Yes Odalys Bojorquez MD   levothyroxine (SYNTHROID) 50 MCG tablet TAKE 1 TABLET BY MOUTH EVERY DAY Yes Magno Vann MD   losartan (COZAAR) 100 MG tablet TAKE 1 TABLET BY MOUTH EVERY DAY Yes Magno Vann MD       [facility] (Including outside providers/suppliers regularly involved in providing care):   Patient Care Team:  Magno Vann MD as PCP - General (Internal Medicine)  Magno Vann MD as PCP - [facility] Empaneled Provider     Reviewed and updated this visit:  Tobacco  Allergies  Meds  Problems  Med Hx  Surg Hx  Soc Hx  Fam Hx

## 2022-05-27 DIAGNOSIS — E03.9 HYPOTHYROIDISM, UNSPECIFIED TYPE: ICD-10-CM

## 2022-05-27 LAB — T4 FREE: 1.2 NG/DL (ref 0.9–1.8)

## 2022-06-14 ENCOUNTER — OFFICE VISIT (OUTPATIENT)
Dept: ORTHOPEDIC SURGERY | Age: 87
End: 2022-06-14
Payer: MEDICARE

## 2022-06-14 VITALS — HEIGHT: 62 IN | WEIGHT: 156 LBS | BODY MASS INDEX: 28.71 KG/M2

## 2022-06-14 DIAGNOSIS — M25.511 RIGHT SHOULDER PAIN, UNSPECIFIED CHRONICITY: Primary | ICD-10-CM

## 2022-06-14 DIAGNOSIS — M67.911 ROTATOR CUFF DYSFUNCTION, RIGHT: ICD-10-CM

## 2022-06-14 PROCEDURE — 20610 DRAIN/INJ JOINT/BURSA W/O US: CPT | Performed by: ORTHOPAEDIC SURGERY

## 2022-06-14 PROCEDURE — 99213 OFFICE O/P EST LOW 20 MIN: CPT | Performed by: ORTHOPAEDIC SURGERY

## 2022-06-14 RX ORDER — LIDOCAINE HYDROCHLORIDE 20 MG/ML
4 INJECTION, SOLUTION INFILTRATION; PERINEURAL ONCE
Status: COMPLETED | OUTPATIENT
Start: 2022-06-14 | End: 2022-06-14

## 2022-06-14 RX ORDER — METHYLPREDNISOLONE ACETATE 40 MG/ML
80 INJECTION, SUSPENSION INTRA-ARTICULAR; INTRALESIONAL; INTRAMUSCULAR; SOFT TISSUE ONCE
Status: COMPLETED | OUTPATIENT
Start: 2022-06-14 | End: 2022-06-14

## 2022-06-14 RX ADMIN — LIDOCAINE HYDROCHLORIDE 4 ML: 20 INJECTION, SOLUTION INFILTRATION; PERINEURAL at 13:49

## 2022-06-14 RX ADMIN — METHYLPREDNISOLONE ACETATE 80 MG: 40 INJECTION, SUSPENSION INTRA-ARTICULAR; INTRALESIONAL; INTRAMUSCULAR; SOFT TISSUE at 13:41

## 2022-06-14 NOTE — LETTER
Shoulder Elbow Rehabilitation Referral    Patient Name: Bre Tovar      YOB: 1935    Diagnosis:   1. Right shoulder pain, unspecified chronicity    2. Rotator cuff dysfunction, right        Precautions: CSI 6/14/2022    Post Op Instructions:  [] Continuous passive motion (CPM)  [] Elbow range of motion  [x] Exercise in plane of scapula   []  Strengthening     [] Pulley and instruction    [x] Home exercise program (copy to patient)   [] Sling when arm at risk  [] Sling or brace at all times   [] AAROM: Forward elevation to             [] AAROM: External rotation to     [] Isometric external rotator strengthening [] AAROM: internal rotation: up the back  [] Isometric abductor strengthening  [] AAROM: Internal abduction     [] Isometric internal rotator strengthening [] AAROM: cross-body adduction             Stretching:     Strengthening:  [x] Four quadrant (FE, ER, IR, CBA)  [x] Rotator cuff (ER, IR, Abd)  [] Forward Elevation    [] External Rotators     [] External Rotation    [] Internal Rotators  [] Internal Rotation: up/back   [] Abductors     [] Internal Rotation: supine in abduction  [] Flexors  [] Cross-body abduction    [] Extensors  [] Pendulum (FE, Abd/Add, cw/ccw)  [x] Scapular Stabilizers   [] Wall-walking (FE, Abd)    [x] Shoulder shrugs     [] Table slides      [x] Rhomboid pinch  [] Elbow (flex, ext, pron, sup)    [] Lat.  Pull downs     [] Medial epicondylitis program    [] Forward punch   [] Lateral epicondylitis program    [] Internal rotators     [x] Progressive resistive exercises  [] Bench Press        [] Bench press plus  Activities:     [] Lateral pull-downs  [x] Rowing     [x] Progressive two-hand supine press  [] Stepper/Exercise bike   [] Biceps: curls/supination  [] Swimming  [] Water exercises    Modalities: PRN    Return to Sport:  [] Ultrasound     [] Plyometrics  [] Iontophoresis     [] Rhythmic stabilization  [] Moist heat     [] Core strengthening   [] Massage     [] Sports specific program:   [x] Cryotherapy      [] Electrical stimulation     [] Paraffin  [] Whirlpool  [] TENS    [x] Home exercise program (copy to patient). Perform exercises for:   15     minutes    2-3      times/day  [x] Supervised physical therapy  Frequency: []  1x week  [x] 2x week  [] 3x week  [] Other:   Duration: [] 2 weeks   [] 4 weeks  [x] 6 weeks  [] Other:     Additional Instructions:           Libby Rogers MD, PhD
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
## 2022-06-21 ENCOUNTER — HOSPITAL ENCOUNTER (OUTPATIENT)
Dept: PHYSICAL THERAPY | Age: 87
Setting detail: THERAPIES SERIES
Discharge: HOME | End: 2022-06-21
Payer: MEDICARE

## 2022-06-21 PROCEDURE — 97110 THERAPEUTIC EXERCISES: CPT

## 2022-06-21 PROCEDURE — 97161 PT EVAL LOW COMPLEX 20 MIN: CPT

## 2022-06-21 PROCEDURE — 97140 MANUAL THERAPY 1/> REGIONS: CPT

## 2022-06-21 NOTE — PLAN OF CARE
The [address] and [address],  [facility], [facility] [address]                  [address] [facility]        Keila Blanco  Phone: [phone]                                                                          Fax: [phone]     Physical Therapy Certification    Dear Sathish Schmitz MD,    We had the pleasure of evaluating the following patient for physical therapy services at [address] [place] [address]. A summary of our findings can be found in the initial assessment below. This includes our plan of care. If you have any questions or concerns regarding these findings, please do not hesitate to contact me at the office phone number checked above. Thank you for the referral.       Physician Signature:_______________________________Date:__________________  By signing above (or electronic signature), therapists plan is approved by physician    Patient: Alvarado Mejias   : 1935   MRN: [id number]  Referring Physician: Sathish Schmitz MD      Evaluation Date: 2022      Medical Diagnosis Information:  Diagnosis: B55.471 (ICD-10-CM) - Right shoulder pain, unspecified [id number] (ICD-10-CM) - Rotator cuff dysfunction, right   Treatment Diagnosis: R Shoulder Pain M25.511                                         Insurance information: PT Insurance Information: Joint [facility] [employer] [facility]    Precautions/ Contra-indications: None of note  C-SSRS Triggered by Intake questionnaire (Past 2 wk assessment):   [x] No, Questionnaire did not trigger screening.   [] Yes, Patient intake triggered further evaluation      [] C-SSRS Screening completed  [] PCP notified via Plan of Care  [] Emergency services notified     Latex Allergy:  [x]NO      []YES  Preferred Language for Healthcare:   [x]English       []other:    SUBJECTIVE: Patient stated complaint: Patient reports a chronic history of shoulder pain that dates over 50 years.  She reports that recently it has been getting worse. She did see Dr. Harpreet Rebolledo, who told her that she does not have advanced arthritis but she possibly have some tears in her rotator cuff. She did receive a cortisone shot but has not noticed much benefit from that. Relevant Medical History:None of note  Functional Disability Index:  FOTO: 56/100    Pain Scale: 5/10    Type: []Constant   [x]Intermittent  []Radiating [x]Localized []other:     Numbness/Tingling: None of note    Occupation/School: Retired    Living Status/Prior Level of Function: Independent with ADLs and IADLs    OBJECTIVE:     AROM Left Right   Shoulder Flex 150 70   Shoulder Abd 150 40   Shoulder ER WNL WNL   Shoulder IR WNL WNL                  Strength  Left Right   Shoulder Flex 4 3   Shoulder abd 4 3   Shoulder ER 5 4+   Shoulder IR 4+ 4               Reflexes/Sensation:    [x]Dermatomes/Myotomes intact    []Other:    Joint mobility:    [x]Normal    []Hypo   []Hyper    Palpation: ttp primarily in fron tof shoulder around biceps tendon    Gait: (include devices/WB status): WNL    Orthopedic Special Tests: None this visit                       [x] Patient history, allergies, meds reviewed. Medical chart reviewed. See intake form. Review Of Systems (ROS):  [x]Performed Review of systems (Integumentary, CardioPulmonary, Neurological) by intake and observation. Intake form has been scanned into medical record. Patient has been instructed to contact their primary care physician regarding ROS issues if not already being addressed at this time.       Co-morbidities/Complexities (which will affect course of rehabilitation):   []None           Arthritic conditions   []Rheumatoid arthritis (M05.9)  [x]Osteoarthritis (M19.91)   Cardiovascular conditions   [x]Hypertension (I10)  []Hyperlipidemia (E78.5)  []Angina pectoris (I20)  []Atherosclerosis (I70)   Musculoskeletal conditions   []Disc pathology   []Congenital spine pathologies   []Prior surgical intervention  [x]Osteoporosis (M81.8)  []Osteopenia (M85.8)   Endocrine conditions   []Hypothyroid (E03.9)  []Hyperthyroid Gastrointestinal conditions   []Constipation (H05.76)   Metabolic conditions   []Morbid obesity (E66.01)  []Diabetes type 1(E10.65) or 2 (E11.65)   []Neuropathy (G60.9)     Pulmonary conditions   []Asthma (J45)  []Coughing   []COPD (J44.9)   Psychological Disorders  []Anxiety (F41.9)  []Depression (F32.9)   []Other:   []Other:          Barriers to/and or personal factors that will affect rehab potential:              [x]Age  []Sex              []Motivation/Lack of Motivation                        []Co-Morbidities              []Cognitive Function, education/learning barriers              []Environmental, home barriers              []profession/work barriers  []past PT/medical experience  [x]other:  Justification: older individual with a 50 year history of chronic shoulder pain - will likely require a prolonged prognosis     Falls Risk Assessment (30 days):   [x] Falls Risk assessed and no intervention required.   [] Falls Risk assessed and Patient requires intervention due to being higher risk   TUG score (>12s at risk):     [] Falls education provided, including       ASSESSMENT:   Functional Impairments   [x]Noted spinal or UE joint hypomobility   []Noted spinal or UE joint hypermobility   [x]Decreased UE functional ROM   [x]Decreased UE functional strength   []Abnormal reflexes/sensation/myotomal/dermatomal deficits   [x]Decreased RC/scapular/core strength and neuromuscular control   []other:      Functional Activity Limitations (from functional questionnaire and intake)   [x]Reduced ability to tolerate prolonged functional positions   [x]Reduced ability or difficulty with changes of positions or transfers between positions   [x]Reduced ability to maintain good posture and demonstrate good body mechanics with sitting, bending, and lifting   [x] Reduced ability or tolerance with driving and/or computer work   [x]Reduced ability to sleep   [x]Reduced ability to perform lifting, reaching, carrying tasks   [x]Reduced ability to tolerate impact through UE   [x]Reduced ability to reach behind back   [x]Reduced ability to  or hold objects   [x]Reduced ability to throw or toss an object   []other:    Participation Restrictions   [x]Reduced participation in self care activities   [x]Reduced participation in home management activities   [x]Reduced participation in work activities   [x]Reduced participation in social activities. [x]Reduced participation in sport/recreation activities. Classification:   []Signs/symptoms consistent with post-surgical status including decreased ROM, strength and function.   []Signs/symptoms consistent with joint sprain/strain   []Signs/symptoms consistent with shoulder impingement   []Signs/symptoms consistent with shoulder/elbow/wrist tendinopathy   [x]Signs/symptoms consistent with Rotator cuff tear   []Signs/symptoms consistent with labral tear   []Signs/symptoms consistent with postural dysfunction    []Signs/symptoms consistent with Glenohumeral IR Deficit - <45 degrees   []Signs/symptoms consistent with facet dysfunction of cervical/thoracic spine    []Signs/symptoms consistent with pathology which may benefit from Dry needling     []other:     Prognosis/Rehab Potential:      []Excellent   [x]Good    []Fair   []Poor    Tolerance of evaluation/treatment:    []Excellent   [x]Good    []Fair   []Poor    Physical Therapy Evaluation Complexity Justification  [x] A history of present problem with:  [] no personal factors and/or comorbidities that impact the plan of care;  [x]1-2 personal factors and/or comorbidities that impact the plan of care  []3 personal factors and/or comorbidities that impact the plan of care  [x] An examination of body systems using standardized tests and measures addressing any of the following: body structures and functions (impairments), activity limitations, and/or participation restrictions;:  [x] a total of 1-2 or more elements   [] a total of 3 or more elements   [] a total of 4 or more elements   [x] A clinical presentation with:  [x] stable and/or uncomplicated characteristics   [] evolving clinical presentation with changing characteristics  [] unstable and unpredictable characteristics;   [x] Clinical decision making of [x] low, [] moderate, [] high complexity using standardized patient assessment instrument and/or measurable assessment of functional outcome. [x] EVAL (LOW) 65216 (typically 20 minutes face-to-face)  [] EVAL (MOD) 48309 (typically 30 minutes face-to-face)  [] EVAL (HIGH) 73123 (typically 45 minutes face-to-face)  [] RE-EVAL       PLAN:  Frequency/Duration:  1-2 days per week for 12 Weeks:  INTERVENTIONS:  [x] Therapeutic exercise including: strength training, ROM, for Upper extremity and core   [x]  NMR activation and proprioception for UE, scap and Core   [x] Manual therapy as indicated for shoulder, scapula and spine to include: Dry Needling/IASTM, STM, PROM, Gr I-IV mobilizations, manipulation. [x] Modalities as needed that may include: thermal agents, E-stim, Biofeedback, US, iontophoresis as indicated  [x] Patient education on joint protection, postural re-education, activity modification, progression of HEP. HEP instruction: Access Code: [email or web address]  URL: [email or web address]  Date: 06/21/2022  Prepared by: Wanda Puente    Exercises  Standing Shoulder and Trunk Flexion at Table - 1 x daily - 7 x weekly - 1 sets - 10 reps  Flexion-Extension Shoulder Pendulum with Table Support - 2 x daily - 7 x weekly - 1 sets - 10 reps  Horizontal Shoulder Pendulum with Table Support - 2 x daily - 7 x weekly - 1 sets - 10 reps  Shoulder External Rotation with Anchored Resistance - 1 x daily - 7 x weekly - 3 sets - 10 reps  Shoulder Internal Rotation with Resistance - 1 x daily - 7 x weekly - 3 sets - 10 reps  Standing Shoulder Row with Anchored Resistance - 1 x daily - 7 x weekly - 3 sets - 10 reps      GOALS:  Patient stated goal: To get rid of the pain in her shoulder  [] Progressing: [] Met: [] Not Met: [] Adjusted    Therapist goals for Patient:   Short Term Goals: To be achieved in: 2 weeks  1. Independent in HEP and progression per patient tolerance, in order to prevent re-injury. [] Progressing: [] Met: [] Not Met: [] Adjusted  2. Patient will have a decrease in pain to facilitate improvement in movement, function, and ADLs as indicated by Functional Deficits. [] Progressing: [] Met: [] Not Met: [] Adjusted    Long Term Goals: To be achieved in: 12 weeks  1. Disability index score of 64 or more per FOTO to assist with reaching prior level of function. [] Progressing: [] Met: [] Not Met: [] Adjusted  2. Patient will demonstrate an increase in Strength to 4/5 globally in her shoulders to allow for proper functional mobility as indicated by patients Functional Deficits. [] Progressing: [] Met: [] Not Met: [] Adjusted  3. Patient will return to all ADL's and household functional activities without increased symptoms or restriction.    [] Progressing: [] Met: [] Not Met: [] Adjusted        Electronically signed by:  Felipa Cash PT

## 2022-06-21 NOTE — FLOWSHEET NOTE
The [facility] and [address],  Sports Performance and Rehabilitation, [facility] [address]                  [address] [facility],5[address]        Keila Blanco  Phone: [phone]                                                                          Fax: [phone]        Date:  2022    Patient Name:  Poncho Hadley    :  1935  MRN: [id number]  Restrictions/Precautions:    Medical/Treatment Diagnosis Information:  Diagnosis: M25.511 (ICD-10-CM) - Right shoulder pain, unspecified [id number] (ICD-10-CM) - Rotator cuff dysfunction, right  Treatment Diagnosis: R Shoulder Pain J63.128  Insurance/Certification information:  PT Insurance Information: [facility]  Physician Information:  Toby Unger MD  Has the plan of care been signed (Y/N):        []  Yes  [x]  No     Date of Patient follow up with Physician: 22      Is this a Progress Report:     []  Yes  [x]  No        If Yes:  Date Range for reporting period:  Beginning 22  Ending    Progress report will be due (10 Rx or 30 days whichever is less):        Recertification will be due (POC Duration  / 90 days whichever is less): 22      Visit # Insurance Allowable Auth Required   In-person 1 BMN []  Yes [x]  No          Functional Scale: FOTO: 56/100    Date assessed:  22       Number of Comorbidities:  []0     [x]1-2    []3+    Latex Allergy:  [x]NO      []YES  Preferred Language for Healthcare:   [x]English       []other:      Pain level:  5/10     SUBJECTIVE:  See eval    OBJECTIVE: See eval   Observation:    Test measurements:      RESTRICTIONS/PRECAUTIONS: None of note    Exercises/Interventions:     Therapeutic Ex (16298)/NMR re-education (57430) Sets/sec Notes/CUES   Table flexion 5x10\"    Pendulums (fwd/bk; s/s) x15 ea way    TB Row  TB ER  TB IR x10  x10  x10                                            PT ED: POC, HEP, Role of PT, Typical Progressions, expectations considering chronicity of issue 15'    Manual Intervention (38070)     PROM of shoulder, joint mobs for increased range 10'                  HEP instruction: Access Code: [email or web address]  URL: [email or web address]  Date: 06/21/2022  Prepared by: Lupe Thompson    Exercises  Standing Shoulder and Trunk Flexion at Table - 1 x daily - 7 x weekly - 1 sets - 10 reps  Flexion-Extension Shoulder Pendulum with Table Support - 2 x daily - 7 x weekly - 1 sets - 10 reps  Horizontal Shoulder Pendulum with Table Support - 2 x daily - 7 x weekly - 1 sets - 10 reps  Shoulder External Rotation with Anchored Resistance - 1 x daily - 7 x weekly - 3 sets - 10 reps  Shoulder Internal Rotation with Resistance - 1 x daily - 7 x weekly - 3 sets - 10 reps  Standing Shoulder Row with Anchored Resistance - 1 x daily - 7 x weekly - 3 sets - 10 reps        Therapeutic Exercise and NMR EXR  [x] (28797) Provided verbal/tactile cueing for activities related to strengthening, flexibility, endurance, ROM  for improvements in scapular, scapulothoracic and UE control with self care, reaching, carrying, lifting, house/yardwork, driving/computer work.    [] (23209) Provided verbal/tactile cueing for activities related to improving balance, coordination, kinesthetic sense, posture, motor skill, proprioception  to assist with  scapular, scapulothoracic and UE control with self care, reaching, carrying, lifting, house/yardwork, driving/computer work. Therapeutic Activities:    [x] (60579 or 44258) Provided verbal/tactile cueing for activities related to improving balance, coordination, kinesthetic sense, posture, motor skill, proprioception and motor activation to allow for proper function of scapular, scapulothoracic and UE control with self care, carrying, lifting, driving/computer work.      Home Exercise Program:    [x] (14625) Reviewed/Progressed HEP activities related to strengthening, flexibility, endurance, ROM of scapular, scapulothoracic and UE control with self care, reaching, carrying, lifting, house/yardwork, driving/computer work  [] (28614) Reviewed/Progressed HEP activities related to improving balance, coordination, kinesthetic sense, posture, motor skill, proprioception of scapular, scapulothoracic and UE control with self care, reaching, carrying, lifting, house/yardwork, driving/computer work      Manual Treatments:  PROM / STM / Oscillations-Mobs:  G-I, II, III, IV (PA's, Inf., Post.)  [x] (81227) Provided manual therapy to mobilize soft tissue/joints of cervical/CT, scapular GHJ and UE for the purpose of modulating pain, promoting relaxation,  increasing ROM, reducing/eliminating soft tissue swelling/inflammation/restriction, improving soft tissue extensibility and allowing for proper ROM for normal function with self care, reaching, carrying, lifting, house/yardwork, driving/computer work    Modalities:      Charges  Timed Code Treatment Minutes: 40   Total Treatment Minutes: 50     [x] EVAL (LOW) 81415   [] EVAL (MOD) 64314   [] EVAL (HIGH) 04300   [] RE-EVAL     [x] RV(80740) x 2    [] IONTO  [] NMR (44254) x     [] VASO  [x] Manual (51215) x 1     [] Other:  [] TA x      [] Mech Traction (91162)  [] ES(attended) (78144)      [] ES (un) (21596):     GOALS:  Patient stated goal: To get rid of the pain in her shoulder  [] Progressing: [] Met: [] Not Met: [] Adjusted    Therapist goals for Patient:   Short Term Goals: To be achieved in: 2 weeks  1. Independent in HEP and progression per patient tolerance, in order to prevent re-injury. [] Progressing: [] Met: [] Not Met: [] Adjusted  2. Patient will have a decrease in pain to facilitate improvement in movement, function, and ADLs as indicated by Functional Deficits. [] Progressing: [] Met: [] Not Met: [] Adjusted    Long Term Goals: To be achieved in: 12 weeks  1. Disability index score of 64 or more per FOTO to assist with reaching prior level of function.    [] Progressing: [] Met: [] Not Met: []

## 2022-06-28 ENCOUNTER — HOSPITAL ENCOUNTER (OUTPATIENT)
Dept: PHYSICAL THERAPY | Age: 87
Setting detail: THERAPIES SERIES
Discharge: HOME | End: 2022-06-28
Payer: MEDICARE

## 2022-06-28 PROCEDURE — 97140 MANUAL THERAPY 1/> REGIONS: CPT

## 2022-06-28 PROCEDURE — 97110 THERAPEUTIC EXERCISES: CPT

## 2022-06-28 NOTE — FLOWSHEET NOTE
The [facility] and [address],  Sports Performance and Rehabilitation, [facility] [address]                  [address] [facility],5Th Floor        Keila Blanco  Phone: [phone]                                                                          Fax: [phone]        Date:  2022    Patient Name:  Marjorie Wagner    :  1935  MRN: [id number]  Restrictions/Precautions:    Medical/Treatment Diagnosis Information:  Diagnosis: M25.511 (ICD-10-CM) - Right shoulder pain, unspecified [id number] (ICD-10-CM) - Rotator cuff dysfunction, right  Treatment Diagnosis: R Shoulder Pain N73.120  Insurance/Certification information:  PT Insurance Information: [facility]  Physician Information:  Agata England MD  Has the plan of care been signed (Y/N):        [x]  Yes  []  No     Date of Patient follow up with Physician: 22      Is this a Progress Report:     []  Yes  [x]  No        If Yes:  Date Range for reporting period:  Beginning 22  Ending    Progress report will be due (10 Rx or 30 days whichever is less):        Recertification will be due (POC Duration  / 90 days whichever is less): 22      Visit # Insurance Allowable Auth Required   In-person 2 BMN []  Yes [x]  No          Functional Scale: FOTO: 56/100    Date assessed:  22       Number of Comorbidities:  []0     [x]1-2    []3+    Latex Allergy:  [x]NO      []YES  Preferred Language for Healthcare:   [x]English       []other:      Pain level:  2-310     SUBJECTIVE:  Patient reports that she feels a little more achy today because of doing the exercises at home. She states that she has not been able to complete quite all of the exercises due to pain.     OBJECTIVE:    Observation: TrP noted throughout upper trap which did reproduce familiar pain   Test measurements:      RESTRICTIONS/PRECAUTIONS: None of note    Exercises/Interventions:     Therapeutic Ex (63970)/NMR re-education (61045) Sets/sec Notes/CUES   Cane flexion x10    Pendulums (fwd/bk; s/s) x10 ea way    TB Row  TB ER  TB IR 2x10  2x10  2x10 RTB  YTB  YTB   TB Shrugs  TB curls 2x10  2x10 BTB  BTB                                      PT ED: Tech for HEP, adjusting reps and sets to avoid shoulder aggravation 15'    Manual Intervention (55724)     PROM of shoulder, joint mobs for increased range 10'                  HEP instruction: Access Code: [email or web address]  URL: [email or web address]  Date: 06/21/2022  Prepared by: Connie Burciaga    Exercises  Standing Shoulder and Trunk Flexion at Table - 1 x daily - 7 x weekly - 1 sets - 10 reps  Flexion-Extension Shoulder Pendulum with Table Support - 2 x daily - 7 x weekly - 1 sets - 10 reps  Horizontal Shoulder Pendulum with Table Support - 2 x daily - 7 x weekly - 1 sets - 10 reps  Shoulder External Rotation with Anchored Resistance - 1 x daily - 7 x weekly - 3 sets - 10 reps  Shoulder Internal Rotation with Resistance - 1 x daily - 7 x weekly - 3 sets - 10 reps  Standing Shoulder Row with Anchored Resistance - 1 x daily - 7 x weekly - 3 sets - 10 reps        Therapeutic Exercise and NMR EXR  [x] (32211) Provided verbal/tactile cueing for activities related to strengthening, flexibility, endurance, ROM  for improvements in scapular, scapulothoracic and UE control with self care, reaching, carrying, lifting, house/yardwork, driving/computer work.    [] (26796) Provided verbal/tactile cueing for activities related to improving balance, coordination, kinesthetic sense, posture, motor skill, proprioception  to assist with  scapular, scapulothoracic and UE control with self care, reaching, carrying, lifting, house/yardwork, driving/computer work.     Therapeutic Activities:    [x] (11286 or 01178) Provided verbal/tactile cueing for activities related to improving balance, coordination, kinesthetic sense, posture, motor skill, proprioception and motor activation to allow for proper function of scapular, scapulothoracic and UE control with self care, carrying, lifting, driving/computer work. Home Exercise Program:    [x] (66173) Reviewed/Progressed HEP activities related to strengthening, flexibility, endurance, ROM of scapular, scapulothoracic and UE control with self care, reaching, carrying, lifting, house/yardwork, driving/computer work  [] (60695) Reviewed/Progressed HEP activities related to improving balance, coordination, kinesthetic sense, posture, motor skill, proprioception of scapular, scapulothoracic and UE control with self care, reaching, carrying, lifting, house/yardwork, driving/computer work      Manual Treatments:  PROM / STM / Oscillations-Mobs:  G-I, II, III, IV (PA's, Inf., Post.)  [x] (62881) Provided manual therapy to mobilize soft tissue/joints of cervical/CT, scapular GHJ and UE for the purpose of modulating pain, promoting relaxation,  increasing ROM, reducing/eliminating soft tissue swelling/inflammation/restriction, improving soft tissue extensibility and allowing for proper ROM for normal function with self care, reaching, carrying, lifting, house/yardwork, driving/computer work    Modalities:      Charges  Timed Code Treatment Minutes: 40   Total Treatment Minutes: 40     [] EVAL (LOW) 25388   [] EVAL (MOD) 92492   [] EVAL (HIGH) 24718   [] RE-EVAL     [x] DN(83836) x 2    [] IONTO  [] NMR (15206) x     [] VASO  [x] Manual (94223) x 1     [] Other:  [] TA x      [] Mech Traction (64111)  [] ES(attended) (03355)      [] ES (un) (55559):     GOALS:  Patient stated goal: To get rid of the pain in her shoulder  [] Progressing: [] Met: [] Not Met: [] Adjusted    Therapist goals for Patient:   Short Term Goals: To be achieved in: 2 weeks  1. Independent in HEP and progression per patient tolerance, in order to prevent re-injury. [] Progressing: [] Met: [] Not Met: [] Adjusted  2.  Patient will have a decrease in pain to facilitate improvement in movement, function, and ADLs as indicated by Functional Deficits. [] Progressing: [] Met: [] Not Met: [] Adjusted    Long Term Goals: To be achieved in: 12 weeks  1. Disability index score of 64 or more per FOTO to assist with reaching prior level of function. [] Progressing: [] Met: [] Not Met: [] Adjusted  2. Patient will demonstrate an increase in Strength to 4/5 globally in her shoulders to allow for proper functional mobility as indicated by patients Functional Deficits. [] Progressing: [] Met: [] Not Met: [] Adjusted  3. Patient will return to all ADL's and household functional activities without increased symptoms or restriction. [] Progressing: [] Met: [] Not Met: [] Adjusted    ASSESSMENT:  Jenny Montaño tolerated a slight increase in workload today without issue. She did require a heavy amount of cueing for therex this visit for technique to avoid positions that would likely aggravate should pain. She would continue to benefit from skilled physical therapy to maximize her functional outcomes and progress towards goals. Overall Progression Towards Functional goals/ Treatment Progress Update:  [] Patient is progressing as expected towards functional goals listed. [] Progression is slowed due to complexities/Impairments listed. [] Progression has been slowed due to co-morbidities.   [x] Plan just implemented, too soon to assess goals progression <30days   [] Goals require adjustment due to lack of progress  [] Patient is not progressing as expected and requires additional follow up with physician  [] Other    Prognosis for POC: [x] Good [] Fair  [] Poor      Patient requires continued skilled intervention: [x] Yes  [] No    Treatment/Activity Tolerance:  [x] Patient able to complete treatment  [] Patient limited by fatigue  [] Patient limited by pain    [] Patient limited by other medical complications  [] Other:       PLAN:  [] Continue per plan of care [] Alter current plan (see comments above)  [x] Plan of care initiated [] Hold pending MD visit [] Discharge      Electronically signed by:  Kwaku Montanez PT    Note: If patient does not return for scheduled/ recommended follow up visits, this note will serve as a discharge from care along with most recent update on progress.

## 2022-07-05 ENCOUNTER — HOSPITAL ENCOUNTER (OUTPATIENT)
Dept: PHYSICAL THERAPY | Age: 87
Setting detail: THERAPIES SERIES
Discharge: HOME | End: 2022-07-05
Payer: MEDICARE

## 2022-07-05 PROCEDURE — 97140 MANUAL THERAPY 1/> REGIONS: CPT

## 2022-07-05 PROCEDURE — 97110 THERAPEUTIC EXERCISES: CPT

## 2022-07-05 NOTE — FLOWSHEET NOTE
The [facility] and [address],  Sports Performance and Rehabilitation, [facility] [address]                  [address] [facility],[address]        Keila Blanco  Phone: [phone]                                                                          Fax: [phone]        Date:  2022    Patient Name:  Robel Benitez    :  1935  MRN: [id number]  Restrictions/Precautions:    Medical/Treatment Diagnosis Information:  Diagnosis: M25.511 (ICD-10-CM) - Right shoulder pain, unspecified [id number] (ICD-10-CM) - Rotator cuff dysfunction, right  Treatment Diagnosis: R Shoulder Pain Q04.607  Insurance/Certification information:  PT Insurance Information: [facility]  Physician Information:  Mari Victoria MD  Has the plan of care been signed (Y/N):        [x]  Yes  []  No     Date of Patient follow up with Physician: 22      Is this a Progress Report:     []  Yes  [x]  No        If Yes:  Date Range for reporting period:  Beginning 22  Ending    Progress report will be due (10 Rx or 30 days whichever is less): 65       Recertification will be due (POC Duration  / 90 days whichever is less): 22      Visit # Insurance Allowable Auth Required   In-person 3 BMN []  Yes [x]  No          Functional Scale: FOTO: 56/100    Date assessed:  22       Number of Comorbidities:  []0     [x]1-2    []3+    Latex Allergy:  [x]NO      []YES  Preferred Language for Healthcare:   [x]English       []other:      Pain level:  Unable to describe/10     SUBJECTIVE:  Patient reports some anterior pain this visit but cannot describe it, she does demonstrate flexion that causes it but cannot describe how severe that pain is.      OBJECTIVE:    Observation: TrP noted throughout upper trap which did reproduce familiar pain; less anterior shoulder pain this visit   Test measurements:      RESTRICTIONS/PRECAUTIONS: None of note    Exercises/Interventions:     Therapeutic Ex activation to allow for proper function of scapular, scapulothoracic and UE control with self care, carrying, lifting, driving/computer work. Home Exercise Program:    [x] (05112) Reviewed/Progressed HEP activities related to strengthening, flexibility, endurance, ROM of scapular, scapulothoracic and UE control with self care, reaching, carrying, lifting, house/yardwork, driving/computer work  [] (54909) Reviewed/Progressed HEP activities related to improving balance, coordination, kinesthetic sense, posture, motor skill, proprioception of scapular, scapulothoracic and UE control with self care, reaching, carrying, lifting, house/yardwork, driving/computer work      Manual Treatments:  PROM / STM / Oscillations-Mobs:  G-I, II, III, IV (PA's, Inf., Post.)  [x] (26410) Provided manual therapy to mobilize soft tissue/joints of cervical/CT, scapular GHJ and UE for the purpose of modulating pain, promoting relaxation,  increasing ROM, reducing/eliminating soft tissue swelling/inflammation/restriction, improving soft tissue extensibility and allowing for proper ROM for normal function with self care, reaching, carrying, lifting, house/yardwork, driving/computer work    Modalities:      Charges  Timed Code Treatment Minutes: 40   Total Treatment Minutes: 40     [] EVAL (LOW) 58626   [] EVAL (MOD) 25563   [] EVAL (HIGH) 00273   [] RE-EVAL     [x] ISABELL(27617) x 2    [] IONTO  [] NMR (05220) x     [] VASO  [x] Manual (51446) x 1     [] Other:  [] TA x      [] Mech Traction (83118)  [] ES(attended) (79087)      [] ES (un) (39749):     GOALS:  Patient stated goal: To get rid of the pain in her shoulder  [] Progressing: [] Met: [] Not Met: [] Adjusted    Therapist goals for Patient:   Short Term Goals: To be achieved in: 2 weeks  1. Independent in HEP and progression per patient tolerance, in order to prevent re-injury. [] Progressing: [] Met: [] Not Met: [] Adjusted  2.  Patient will have a decrease in pain to facilitate improvement in movement, function, and ADLs as indicated by Functional Deficits. [] Progressing: [] Met: [] Not Met: [] Adjusted    Long Term Goals: To be achieved in: 12 weeks  1. Disability index score of 64 or more per FOTO to assist with reaching prior level of function. [] Progressing: [] Met: [] Not Met: [] Adjusted  2. Patient will demonstrate an increase in Strength to 4/5 globally in her shoulders to allow for proper functional mobility as indicated by patients Functional Deficits. [] Progressing: [] Met: [] Not Met: [] Adjusted  3. Patient will return to all ADL's and household functional activities without increased symptoms or restriction. [] Progressing: [] Met: [] Not Met: [] Adjusted    ASSESSMENT:  Abhi Montalvo tolerated a slight increase in workload today without issue. She did report some light shoulder pain with some movements today that did improve with some intermittent rest. She would continue to benefit from skilled physical therapy to maximize her functional outcomes and progress towards goals. Overall Progression Towards Functional goals/ Treatment Progress Update:  [] Patient is progressing as expected towards functional goals listed. [] Progression is slowed due to complexities/Impairments listed. [] Progression has been slowed due to co-morbidities.   [x] Plan just implemented, too soon to assess goals progression <30days   [] Goals require adjustment due to lack of progress  [] Patient is not progressing as expected and requires additional follow up with physician  [] Other    Prognosis for POC: [x] Good [] Fair  [] Poor      Patient requires continued skilled intervention: [x] Yes  [] No    Treatment/Activity Tolerance:  [x] Patient able to complete treatment  [] Patient limited by fatigue  [] Patient limited by pain    [] Patient limited by other medical complications  [] Other:       PLAN:  [] Continue per plan of care [] Alter current plan (see comments above)  [x] Plan of

## 2022-07-12 ENCOUNTER — HOSPITAL ENCOUNTER (OUTPATIENT)
Dept: PHYSICAL THERAPY | Age: 87
Setting detail: THERAPIES SERIES
Discharge: HOME | End: 2022-07-12
Payer: MEDICARE

## 2022-07-12 PROCEDURE — 97110 THERAPEUTIC EXERCISES: CPT

## 2022-07-12 PROCEDURE — 97140 MANUAL THERAPY 1/> REGIONS: CPT

## 2022-07-19 ENCOUNTER — HOSPITAL ENCOUNTER (OUTPATIENT)
Dept: PHYSICAL THERAPY | Age: 87
Setting detail: THERAPIES SERIES
Discharge: HOME | End: 2022-07-19
Payer: MEDICARE

## 2022-07-19 PROCEDURE — 97110 THERAPEUTIC EXERCISES: CPT

## 2022-07-19 PROCEDURE — 97140 MANUAL THERAPY 1/> REGIONS: CPT

## 2022-07-19 NOTE — FLOWSHEET NOTE
[facility] and [address],  Sports Performance and Rehabilitation, [facility] [address]                  [address] [facility],5[address] Floor        Keila Blnaco  Phone: [phone]                                                                          Fax: [phone]        Date:  2022    Patient Name:  Amari Beauchamp    :  1935  MRN: [id number]  Restrictions/Precautions:    Medical/Treatment Diagnosis Information:  Diagnosis: M25.511 (ICD-10-CM) - Right shoulder pain, unspecified [id number] (ICD-10-CM) - Rotator cuff dysfunction, right  Treatment Diagnosis: R Shoulder Pain W00.428  Insurance/Certification information:  PT Insurance Information: [facility]  Physician Information:  Serge Webber MD  Has the plan of care been signed (Y/N):        [x]  Yes  []  No     Date of Patient follow up with Physician: 22      Is this a Progress Report:     []  Yes  [x]  No        If Yes:  Date Range for reporting period:  Beginning 22  Ending    Progress report will be due (10 Rx or 30 days whichever is less):        Recertification will be due (POC Duration  / 90 days whichever is less): 22      Visit # Insurance Allowable Auth Required   In-person 6 BMN []  Yes [x]  No          Functional Scale: FOTO: 56/100    Date assessed:  22       Number of Comorbidities:  []0     [x]1-2    []3+    Latex Allergy:  [x]NO      []YES  Preferred Language for Healthcare:   [x]English       []other:      Pain level:  0-1/10 @ rest     SUBJECTIVE:  Patient reports some continued pain in the front of the shoulder that worsens with lifting at home    OBJECTIVE:   Observation: TrP noted throughout upper trap which did reproduce familiar pain; less anterior shoulder pain this visit  Test measurements:      RESTRICTIONS/PRECAUTIONS: None of note    Exercises/Interventions:     Therapeutic Ex (82248)/NMR re-education (28152) Sets/sec Notes/CUES   Supine Cane NINOSKAOM  SB flexion; s/s x10  x20/x15    TB Row  TB extension  3x12  3x10  BTB  RTB  DB curls (supinated/hammer) X12 ea 4#   Wall walks towel  Wall diagonals  X10 R/L  X10 R    Finisher Table:  Flexion  Circles in/out  Diagonals X10 ea                                  PT ED: Tech for HEP, adjusting reps and sets to avoid shoulder aggravation 15'    Manual Intervention (58779)     PROM of shoulder, joint mobs for increased range, cross body stretch 10'                  HEP instruction: Access Code: [email or web address]  URL: [email or web address]  Date: 06/21/2022  Prepared by: Clare Sensing    Exercises  Standing Shoulder and Trunk Flexion at Table - 1 x daily - 7 x weekly - 1 sets - 10 reps  Flexion-Extension Shoulder Pendulum with Table Support - 2 x daily - 7 x weekly - 1 sets - 10 reps  Horizontal Shoulder Pendulum with Table Support - 2 x daily - 7 x weekly - 1 sets - 10 reps  Shoulder External Rotation with Anchored Resistance - 1 x daily - 7 x weekly - 3 sets - 10 reps  Shoulder Internal Rotation with Resistance - 1 x daily - 7 x weekly - 3 sets - 10 reps  Standing Shoulder Row with Anchored Resistance - 1 x daily - 7 x weekly - 3 sets - 10 reps        Therapeutic Exercise and NMR EXR  [x] (50518) Provided verbal/tactile cueing for activities related to strengthening, flexibility, endurance, ROM  for improvements in scapular, scapulothoracic and UE control with self care, reaching, carrying, lifting, house/yardwork, driving/computer work.    [] (40399) Provided verbal/tactile cueing for activities related to improving balance, coordination, kinesthetic sense, posture, motor skill, proprioception  to assist with  scapular, scapulothoracic and UE control with self care, reaching, carrying, lifting, house/yardwork, driving/computer work.     Therapeutic Activities:    [x] (08336 or 75852) Provided verbal/tactile cueing for activities related to improving balance, coordination, kinesthetic sense, posture, motor skill, re-injury. [] Progressing: [x] Met: [] Not Met: [] Adjusted  2. Patient will have a decrease in pain to facilitate improvement in movement, function, and ADLs as indicated by Functional Deficits. [] Progressing: [x] Met: [] Not Met: [] Adjusted    Long Term Goals: To be achieved in: 12 weeks  1. Disability index score of 64 or more per FOTO to assist with reaching prior level of function. Discharge score not caputred  [] Progressing: [] Met: [x] Not Met: [] Adjusted  2. Patient will demonstrate an increase in Strength to 4/5 globally in her shoulders to allow for proper functional mobility as indicated by patients Functional Deficits. [] Progressing: [x] Met: [] Not Met: [] Adjusted  3. Patient will return to all ADL's and household functional activities without increased symptoms or restriction. [] Progressing: [x] Met: [] Not Met: [] Adjusted    ASSESSMENT:  Esther Esqiuvel continued to tolerate exercises without a notable increase in shoulder pain this visit. She does report continued anterior shoulder pain at home when challenged with lifting. She states, with support from her son, that they will be comfortable continuing improvements independently at home moving forward. She would continue to benefit from skilled physical therapy to maximize her functional outcomes and progress towards goals. Overall Progression Towards Functional goals/ Treatment Progress Update:  [] Patient is progressing as expected towards functional goals listed. [] Progression is slowed due to complexities/Impairments listed. [] Progression has been slowed due to co-morbidities.   [x] Plan just implemented, too soon to assess goals progression <30days   [] Goals require adjustment due to lack of progress  [] Patient is not progressing as expected and requires additional follow up with physician  [] Other    Prognosis for POC: [x] Good [] Fair  [] Poor      Patient requires continued skilled intervention: [x] Yes  [] No    Treatment/Activity Tolerance:  [x] Patient able to complete treatment  [] Patient limited by fatigue  [] Patient limited by pain    [] Patient limited by other medical complications  [] Other:       PLAN:  [] Continue per plan of care [] Alter current plan (see comments above)  [] Plan of care initiated [] Hold pending MD visit [x] Discharge      Electronically signed by:  Thee Gonzalez PT    Note: If patient does not return for scheduled/ recommended follow up visits, this note will serve as a discharge from care along with most recent update on progress.

## 2022-07-26 ENCOUNTER — APPOINTMENT (OUTPATIENT)
Dept: PHYSICAL THERAPY | Age: 87
End: 2022-07-26
Payer: MEDICARE

## 2022-07-26 ENCOUNTER — OFFICE VISIT (OUTPATIENT)
Dept: ORTHOPEDIC SURGERY | Age: 87
End: 2022-07-26
Payer: MEDICARE

## 2022-07-26 VITALS — WEIGHT: 165 LBS | HEIGHT: 62 IN | BODY MASS INDEX: 30.36 KG/M2

## 2022-07-26 DIAGNOSIS — M67.911 ROTATOR CUFF DYSFUNCTION, RIGHT: Primary | ICD-10-CM

## 2022-07-26 PROCEDURE — 1123F ACP DISCUSS/DSCN MKR DOCD: CPT | Performed by: ORTHOPAEDIC SURGERY

## 2022-07-26 PROCEDURE — 99213 OFFICE O/P EST LOW 20 MIN: CPT | Performed by: ORTHOPAEDIC SURGERY

## 2022-07-26 NOTE — PROGRESS NOTES
Chief Complaint    Shoulder Pain (F/U RIGHT SHOULDER)      History of Present Illness:  Joon Bailey is a pleasant, 82 y.o., female, here today for follow up of her right shoulder. We are treating this patient conservatively for pain related to rotator cuff dysfunction. We did administer an injection for this problem back on 6/14/22. Additionally she has been in formal physical therapy at the [facility]. Overall she has not seen a tremendous improvement with her right shoulder. She reports she has no pain at rest but does have increased pain with any movement such as lifting up plate to place on a shelf. She reports no new injuries or setbacks. Pain Assessment  Location of Pain: Shoulder  Location Modifiers: Right  Severity of Pain: 2  Quality of Pain: Aching  Duration of Pain: Persistent  Frequency of Pain: Constant  Aggravating Factors: Other (Comment), Straightening, Stretching, Exercise  Limiting Behavior: Some  Relieving Factors: Rest  Work-Related Injury: No  Are there other pain locations you wish to document?: No      Medical History:  Patient's medications, allergies, past medical, surgical, social and family histories were reviewed and updated as appropriate. Review of Systems  A 14 point review of systems was completed by the patient and is available in the media section of the scanned medical record and was reviewed on 7/26/2022. The review is negative with the exception of those things mentioned in the HPI and Past Medical History    Vital Signs: There were no vitals filed for this visit. General/Appearance: Alert and oriented and in no apparent distress. Skin:  There are no skin lesions, cellulitis, or extreme edema. The patient has warm and well-perfused Bilateral upper extremities with brisk capillary refill. Right Shoulder Exam:  Inspection: No gross deformities, no signs of infection.     Palpation: Tenderness over the rotator cuff footprint    Active Range of Motion: Forward Elevation 130, Abduction 140, External Rotation 30, Internal Rotation L5    Passive Range of Motion: Forward Elevation 150, Abduction 150    Strength:  External Rotation +4/5, Internal Rotation 5/5, Champagne Toast +4/5, Supraspinatus +45    Special Tests:   No Obi muscle deformity. Neurovascular: Sensation to light touch is intact, no motor deficits, palpable radial pulses 2+      Radiology:     No new XR obtained at this time. Assessment :  Ms. Betty Saenz is a pleasant, 82 y.o. patient who is having rotator cuff dysfunction of the right shoulder and so far has not responded well to conservative treatment. Impression:  Encounter Diagnosis   Name Primary? Rotator cuff dysfunction, right Yes       Office Procedures:  Orders Placed This Encounter   Procedures    MRI SHOULDER RIGHT WO CONTRAST     Standing Status:   Future     Standing Expiration Date:   7/26/2023     Order Specific Question:   Reason for exam:     Answer:   [facility] [place]         Treatment Plan: Patient has had a course of physical therapy and had a corticosteroid injection. She continues to have persistent symptoms. At this point we would like to obtain an MRI to rule out any rotator cuff tear. We asked that she hold off on physical therapy for now. She was recommended to keep up with the home exercise program on her own until we have the results of the MRI. If the MRI does not show a tear we can certainly consider an ultrasound-guided corticosteroid injection. We will see Nura Mancuso back in 2 weeks and/or as needed. All questions were answered to patient's satisfaction and She was encouraged to call with any further questions or concerns. Jeevan Carrillo is in agreement with this plan. 7/26/2022  2:21 PM      Shelley Wolfe PA-C  Orthopaedic Sports Medicine Physician Assistant    During this examination, Shelley STILL PA-C, functioned as a scribe for Dr. Jose Alberts.      This dictation was performed with a verbal recognition program (DRAGON) and it was checked for errors. It is possible that there are still dictated errors within this office note. If so, please bring any errors to my attention for an addendum. All efforts were made to ensure that this office note is accurate.  _________________  I, Dr. Leena Sims, personally performed the services described in this documentation as described by Indiana Wade PA-C in my presence, and it is both accurate and complete. Libby Alcantara MD, PhD  7/26/2022

## 2022-08-09 ENCOUNTER — OFFICE VISIT (OUTPATIENT)
Dept: ORTHOPEDIC SURGERY | Age: 87
End: 2022-08-09
Payer: MEDICARE

## 2022-08-09 VITALS — WEIGHT: 165 LBS | BODY MASS INDEX: 30.36 KG/M2 | HEIGHT: 62 IN

## 2022-08-09 DIAGNOSIS — M67.911 ROTATOR CUFF DYSFUNCTION, RIGHT: Primary | ICD-10-CM

## 2022-08-09 DIAGNOSIS — M25.511 RIGHT SHOULDER PAIN, UNSPECIFIED CHRONICITY: ICD-10-CM

## 2022-08-09 PROCEDURE — 99214 OFFICE O/P EST MOD 30 MIN: CPT | Performed by: ORTHOPAEDIC SURGERY

## 2022-08-09 PROCEDURE — 1123F ACP DISCUSS/DSCN MKR DOCD: CPT | Performed by: ORTHOPAEDIC SURGERY

## 2022-08-09 RX ORDER — DIAZEPAM 5 MG/1
10 TABLET ORAL
Qty: 2 TABLET | Refills: 0 | Status: SHIPPED | OUTPATIENT
Start: 2022-08-09 | End: 2022-08-09

## 2022-08-09 NOTE — PROGRESS NOTES
[address] [facility]  History and Physical  Shoulder Pain    Date:  2022    Name:  Emily Villafuerte  Address:  [facility] [address] [address]    :  1935      Age:   82 y.o.    SSN:  [id number]      Medical Record Number:  [id number]    Reason for Visit:    Follow-up (Right Shoulder)      HPI:   Emily Villafuerte is a 82 y.o. female who presents to our office today for follow up of the right shoulder pain. We are treating this patient conservatively for pain related to rotator cuff dysfunction. We performed a right shoulder corticosteroid injection back on 22. Additionally she has been in formal physical therapy at the [facility] office. Overall she has not seen tremendous improvement. She was asked to obtain an MRI. She states she was not able to complete the test, she stated it was very loud and could not tolerate the test.  Patient reports today her shoulder does not feel so bad but she does have times when the pain levels get worse. She denies any new injuries and presents today with her son who was present throughout the entire visit. No notes on file    Review of Systems:  A 14 point review of systems available in the scanned medical record as documented by the patient and reviewed on 2022. The review is negative with the exception of those things mentioned in the History of Present Illness and Past Medical History. Past Medical History:  Patient's medications, allergies, past medical, surgical, social and family histories were reviewed and updated as appropriate. Allergies: Allergies   Allergen Reactions    Nizatidine     Penicillins Hives       Physical Exam:  Vitals:     General: Emily Villafuerte is a healthy and well appearing 82 y.o. female who is sitting comfortably in our office in acute distress. Skin:  There are no skin lesions, cellulitis, or extreme edema.  The patient has warm and well-perfused Bilateral upper extremities with brisk capillary refill. Eyes: Extra-ocular muscles intact  Mouth: Oral mucosa moist. No perioral lesions  Pulm: Respirations unlabored and regular. Neuro: Alert and oriented x3    right Shoulder Exam:  Inspection: No gross deformities, no signs of infection. Palpation:  There is no crepitus. Tenderness to palpation over the rotator cuff footprint. Active Range of Motion: Forward Elevation 130, Abduction 140, External Rotation 30, Internal Rotation L5  Passive Range of Motion: Passively forward elevation can be further increased to 150. Strength: External rotation with resistance with elbow at the side +4/5, internal rotation with resistance with elbow at the side +4/5, Champagne toast testing +4/5,     Special Tests:   No Obi muscle deformity. Neurovascular: Sensation to light touch is intact, no motor deficits, palpable radial pulses 2+    Additional Examinations:    Examination of the contralateral extremity does not show any tenderness, deformity or injury. Range of motion is unremarkable. There is no gross instability. There are no rashes, ulcerations or lesions. Strength and tone are normal.      Radiographic:  X-rays not obtained today. Old x-rays were reviewed which show well-preserved joint space with very little to no arthritic changes. Assessment:  Hardik Alex is a 82 y.o. female with right shoulder pain related to rotator cuff dysfunction. Patient continues to have persistent symptoms of pain despite a trial of conservative treatment. Impression:  Encounter Diagnoses   Name Primary? Rotator cuff dysfunction, right Yes    Right shoulder pain, unspecified chronicity        Office Procedures:  No orders of the defined types were placed in this encounter. Plan:   We discussed with the patient that one option is to resume conservative treatment and work with physical therapy to alleviate the remainder of her symptoms.   However she has tried this without success and continues to have persistent symptoms despite therapy and injections. At this point we are recommending an MRI of the right shoulder. Will prescribe a prescription for Valium that she can take prior to her test to help complete the MRI this time. We will also have her use headphones and possibly go to a different location with minimal sounds. Debo Staton will follow up in 2 weeks and/or as needed. She was in agreement with this plan and all questions were answered to the patient's satisfaction. She was encouraged to call with any questions. Greater than 25 minutes were expended on all aspects of today's visit. 8/9/2022  5:00 PM      Diana Daugherty PA-C  Orthopaedic Sports Medicine Physician Assistant    During this examination, I, Diana Daugherty PA-C, functioned as a scribe for Dr. Pio Navarro. This dictation was performed with a verbal recognition program (DRAGON) and it was checked for errors. It is possible that there are still dictated errors within this office note. If so, please bring any errors to my attention for an addendum. All efforts were made to ensure that this office note is accurate.  _____________________  I, Dr. Pio Navarro, personally performed the services described in this documentation as described by Diana Daugherty PA-C in my presence, and it is both accurate and complete. Libby Munoz MD, PhD  8/9/2022

## 2022-08-22 DIAGNOSIS — F41.9 ANXIETY: ICD-10-CM

## 2022-08-22 DIAGNOSIS — M67.911 ROTATOR CUFF DYSFUNCTION, RIGHT: Primary | ICD-10-CM

## 2022-08-22 RX ORDER — AMLODIPINE BESYLATE 5 MG/1
TABLET ORAL
Qty: 90 TABLET | Refills: 1 | Status: SHIPPED | OUTPATIENT
Start: 2022-08-22

## 2022-08-22 RX ORDER — ALPRAZOLAM 0.5 MG/1
0.5 TABLET ORAL 3 TIMES DAILY PRN
Qty: 2 TABLET | Refills: 0 | Status: SHIPPED | OUTPATIENT
Start: 2022-08-22 | End: 2022-09-21

## 2022-08-30 DIAGNOSIS — I10 ESSENTIAL HYPERTENSION: ICD-10-CM

## 2022-08-30 RX ORDER — LOSARTAN POTASSIUM 100 MG/1
TABLET ORAL
Qty: 90 TABLET | Refills: 1 | Status: SHIPPED | OUTPATIENT
Start: 2022-08-30 | End: 2022-09-21 | Stop reason: SDUPTHER

## 2022-08-30 NOTE — TELEPHONE ENCOUNTER
E-Prescribing Status: Transmission to pharmacy failed (8/30/2022  3:45 PM EDT)    Left refill on pharmacy voicemail

## 2022-09-07 ENCOUNTER — TELEPHONE (OUTPATIENT)
Dept: ORTHOPEDIC SURGERY | Age: 87
End: 2022-09-07

## 2022-09-19 RX ORDER — LEVOTHYROXINE SODIUM 0.05 MG/1
TABLET ORAL
Qty: 90 TABLET | Refills: 1 | Status: SHIPPED | OUTPATIENT
Start: 2022-09-19

## 2022-09-21 ENCOUNTER — OFFICE VISIT (OUTPATIENT)
Dept: ORTHOPEDIC SURGERY | Age: 87
End: 2022-09-21
Payer: MEDICARE

## 2022-09-21 ENCOUNTER — PATIENT MESSAGE (OUTPATIENT)
Dept: INTERNAL MEDICINE CLINIC | Age: 87
End: 2022-09-21

## 2022-09-21 VITALS — WEIGHT: 165 LBS | HEIGHT: 62 IN | BODY MASS INDEX: 30.36 KG/M2

## 2022-09-21 DIAGNOSIS — M75.121 NONTRAUMATIC COMPLETE TEAR OF RIGHT ROTATOR CUFF: ICD-10-CM

## 2022-09-21 DIAGNOSIS — M67.911 ROTATOR CUFF DYSFUNCTION, RIGHT: Primary | ICD-10-CM

## 2022-09-21 DIAGNOSIS — I10 ESSENTIAL HYPERTENSION: ICD-10-CM

## 2022-09-21 PROCEDURE — 99213 OFFICE O/P EST LOW 20 MIN: CPT | Performed by: ORTHOPAEDIC SURGERY

## 2022-09-21 PROCEDURE — 1123F ACP DISCUSS/DSCN MKR DOCD: CPT | Performed by: ORTHOPAEDIC SURGERY

## 2022-09-21 RX ORDER — LOSARTAN POTASSIUM 100 MG/1
TABLET ORAL
Qty: 90 TABLET | Refills: 0 | Status: SHIPPED | OUTPATIENT
Start: 2022-09-21

## 2022-09-21 NOTE — PROGRESS NOTES
12 [facility]  History and Physical  Shoulder Pain    Date:  2022    Name:  Carolin Cook  Address:  [address] [place] [address] [address] [address]    :  1935      Age:   82 y.o.    SSN:  [id number]      Medical Record Number:  [id number]    Reason for Visit:    Shoulder Pain (F/U RIGHT SHOULDER)      HPI:   Carolin Cook is a 82 y.o. female who presents to our office today for follow up of the right shoulder pain. Patient had an MRI recently completed is here to follow-up with the results. She has history of right shoulder pain for which she was seen back in  and received a corticosteroid injection with some relief of her pain. She is also done physical therapy and has improvement in her shoulder range of motion. Pain currently is 4-5 out of 10. Pain Assessment  Location of Pain: Shoulder  Location Modifiers: Right  Relieving Factors: Rest  Work-Related Injury: No  Are there other pain locations you wish to document?: No    No notes on file    Review of Systems:  A 14 point review of systems available in the scanned medical record as documented by the patient and reviewed on 2022. The review is negative with the exception of those things mentioned in the History of Present Illness and Past Medical History. Past Medical History:  Patient's medications, allergies, past medical, surgical, social and family histories were reviewed and updated as appropriate. Allergies: Allergies   Allergen Reactions    Nizatidine     Penicillins Hives       Physical Exam:  There were no vitals filed for this visit. General: Carolin Cook is a healthy and well appearing 82 y.o. female who is sitting comfortably in our office in acute distress. Skin:  There are no skin lesions, cellulitis, or extreme edema. The patient has warm and well-perfused Bilateral upper extremities with brisk capillary refill.     Eyes: Extra-ocular muscles intact  Mouth: Oral mucosa moist. No perioral lesions  Pulm: Respirations unlabored and regular. Neuro: Alert and oriented x3    Right shoulder Exam:  Inspection:  No gross deformities, no signs of infection. Palpation:  There is mild crepitus. Non-tender to palpation over the greater tuberosity, AC joint, glenohumeral joint, proximal biceps. Active Range of Motion: Forward elevation of 100, abduction of 100, external rotation with elbow at the side 45, internal rotation to the back is L3. Passive Range of Motion: Passively forward elevation can be further increased to 120. Strength: External rotation with resistance with elbow at the side 4-/5, internal rotation with resistance with elbow at the side 4+/5, Champagne toast testing 4-/5, Jobes test 4-/5    Special Tests:  No Obi muscle deformity. Neurovascular: Sensation to light touch is intact, no motor deficits, palpable radial pulses 2+    Additional Examinations:    Examination of the contralateral extremity does not show any tenderness, deformity or injury. Range of motion is unremarkable. There is no gross instability. There are no rashes, ulcerations or lesions. Strength and tone are normal.      Radiographic:  MRI of the right shoulder without contrast demonstrates:   1. Full-thickness tear of the supraspinatus measures 1cm in AP and 2.6cm mediolaterally. 2. The long head biceps is torn and the free end is suspected be within the bicipital groove. 3. No labral tear. 4. Synovitis and an effusion at the glenohumeral joint are identified. Assessment:  Darrius Allen is a 82 y.o. female chronic right shoulder full-thickness supraspinatus tear. Impression:  Encounter Diagnoses   Name Primary?     Rotator cuff dysfunction, right Yes    Nontraumatic complete tear of right rotator cuff        Office Procedures:  Orders Placed This Encounter   Procedures    Jana Cee MD, Orthopedics and Sports Medicine (Shoulder; Hip; Knee), [place] Referral Priority:   Routine     Referral Type:   Eval and Treat     Referral Reason:   Specialty Services Required     Requested Specialty:   Orthopedic Surgery     Number of Visits Requested:   1       Plan: We reviewed the exam and MRI findings today with Mitzy Lawson. The MRI findings demonstrated a full-thickness chronic tear of the supraspinatus that is medium in size without any evidence of glenohumeral joint arthritis. Her motion is still relatively very good and she has responded moderately to physical therapy and an injection. At this time we discussed further options including continued conservative care with another injection done by Dr. Randal Cabral, versus a surgical procedure consisting of a subacromial balloon spacer. She is not a good candidate for a reverse shoulder arthroplasty given her retained range of motion and mild to moderate pain with minimal arthritic changes. The patient and her family would like to think about these options, we provided them with Dr. Mota Dense contact information. Mitzy Lawson will follow up as needed. The patient was in agreement with this plan and all questions were answered to the patient's satisfaction, and were encouraged to call with any questions. 9/21/2022  11:19 AM      Diana Eller MD  [facility] Sports Medicine Fellow    The encounter with the patient was supervised by Dr. Yin Medicine examined the patient and reviewed the plan. This dictation was performed with a verbal recognition program (DRAGON) and it was checked for errors. It is possible that there are still dictated errors within this office note. If so, please bring any errors to my attention for an addendum. All efforts were made to ensure that this office note is accurate.  ___________________  I was physically present and personally supervised the Orthopaedic Sports Medicine Fellow in the evaluation and development of a treatment plan for this patient.  I personally

## 2022-09-21 NOTE — TELEPHONE ENCOUNTER
From: López Gilbert  To: Dr. Shawn Moran: 9/21/2022 3:52 PM EDT  Subject: Tate Dys    I was going to refill my mother's weekly pill box and found the [place] missing. We have searched high and low and can't find it. It'll probably turn up some day as most of the things she hides. It's an aspect of her dementia. Since this was a recent refill, the insurance company probably won't approve a new refill. It's okay if I have to pay for it.

## 2022-10-11 ENCOUNTER — OFFICE VISIT (OUTPATIENT)
Dept: ORTHOPEDIC SURGERY | Age: 87
End: 2022-10-11
Payer: MEDICARE

## 2022-10-11 VITALS — WEIGHT: 164.9 LBS | BODY MASS INDEX: 30.35 KG/M2 | HEIGHT: 62 IN

## 2022-10-11 DIAGNOSIS — M75.51 BURSITIS OF RIGHT SHOULDER: ICD-10-CM

## 2022-10-11 DIAGNOSIS — M75.101 TEAR OF RIGHT SUPRASPINATUS TENDON: ICD-10-CM

## 2022-10-11 PROBLEM — M25.511 RIGHT SHOULDER PAIN: Status: ACTIVE | Noted: 2022-10-11

## 2022-10-11 PROCEDURE — 20611 DRAIN/INJ JOINT/BURSA W/US: CPT | Performed by: INTERNAL MEDICINE

## 2022-10-11 PROCEDURE — 99203 OFFICE O/P NEW LOW 30 MIN: CPT | Performed by: INTERNAL MEDICINE

## 2022-10-11 PROCEDURE — 1123F ACP DISCUSS/DSCN MKR DOCD: CPT | Performed by: INTERNAL MEDICINE

## 2022-10-11 RX ORDER — BETAMETHASONE SODIUM PHOSPHATE AND BETAMETHASONE ACETATE 3; 3 MG/ML; MG/ML
6 INJECTION, SUSPENSION INTRA-ARTICULAR; INTRALESIONAL; INTRAMUSCULAR; SOFT TISSUE ONCE
Status: COMPLETED | OUTPATIENT
Start: 2022-10-11 | End: 2022-10-11

## 2022-10-11 RX ORDER — BUPIVACAINE HYDROCHLORIDE 2.5 MG/ML
4 INJECTION, SOLUTION INFILTRATION; PERINEURAL ONCE
Status: COMPLETED | OUTPATIENT
Start: 2022-10-11 | End: 2022-10-11

## 2022-10-11 RX ADMIN — BETAMETHASONE SODIUM PHOSPHATE AND BETAMETHASONE ACETATE 6 MG: 3; 3 INJECTION, SUSPENSION INTRA-ARTICULAR; INTRALESIONAL; INTRAMUSCULAR; SOFT TISSUE at 11:38

## 2022-10-11 RX ADMIN — BUPIVACAINE HYDROCHLORIDE 10 MG: 2.5 INJECTION, SOLUTION INFILTRATION; PERINEURAL at 11:39
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
## 2022-10-11 NOTE — LETTER
[address]  [address] [facility] [address]  Phone: [phone]  Fax: [phone]           Cisco Baeza MD      October 16, 2022     Patient: Pio Reynolds   MR Number: [id number]   YOB: 1935   Date of Visit: 10/11/2022       Dear Dr. Flavia Brice ref. provider found: Thank you for referring Roshni Thrasherr to me for evaluation/treatment. Below are the relevant portions of my assessment and plan of care. Impression: . Encounter Diagnoses   Name Primary?  Tear of right supraspinatus tendon     Bursitis of right shoulder               Plan:       Injection protocol and clinical follow-up with Dr. Anne Cranker  Consider [id number] ultrasound-guided subacromial injection as a palliative option as needed versus subcu acromial balloon spacer    The nature of the finding, probable diagnosis and likely treatment was thoroughly discussed with the family and adult child. The options, risks, complications, alternative treatment as well as some of the differential diagnosis was discussed. The patient was thoroughly informed and all questions were answered. the patient indicated understanding and satisfaction with the discussion. Orders:        Orders Placed This Encounter   Procedures    US GUIDED NEEDLE PLACEMENT     Standing Status:   Future     Number of Occurrences:   1     Standing Expiration Date:   10/11/2023     Order Specific Question:   Reason for exam:     Answer:   CSI    [place] ARTHROCENTESIS ASPIR&/INJ MAJOR JT/BURSA W/O US           Disclaimer: \"This note was dictated with voice recognition software. Though review and correction are routine, we apologize for any errors. \"             If you have questions, please do not hesitate to call me. I look forward to following Alexx Davenport along with you.     Sincerely,        Cisco Baeza MD    CC providers:  MD Vinicio Small [address] [facility] [address]  [employer] [employer]

## 2022-11-18 RX ORDER — MELATONIN
Qty: 90 TABLET | Refills: 2 | Status: SHIPPED | OUTPATIENT
Start: 2022-11-18

## 2022-11-21 ENCOUNTER — OFFICE VISIT (OUTPATIENT)
Dept: INTERNAL MEDICINE CLINIC | Age: 87
End: 2022-11-21
Payer: MEDICARE

## 2022-11-21 VITALS
HEIGHT: 62 IN | BODY MASS INDEX: 27.79 KG/M2 | SYSTOLIC BLOOD PRESSURE: 128 MMHG | WEIGHT: 151 LBS | DIASTOLIC BLOOD PRESSURE: 68 MMHG | TEMPERATURE: 96.9 F | OXYGEN SATURATION: 98 % | HEART RATE: 68 BPM

## 2022-11-21 DIAGNOSIS — N18.30 STAGE 3 CHRONIC KIDNEY DISEASE, UNSPECIFIED WHETHER STAGE 3A OR 3B CKD (HCC): ICD-10-CM

## 2022-11-21 DIAGNOSIS — R63.4 LOSS OF WEIGHT: Primary | ICD-10-CM

## 2022-11-21 DIAGNOSIS — E03.9 HYPOTHYROIDISM, UNSPECIFIED TYPE: ICD-10-CM

## 2022-11-21 DIAGNOSIS — I13.0 HYPERTENSIVE HEART AND CHRONIC KIDNEY DISEASE WITH HEART FAILURE AND STAGE 1 THROUGH STAGE 4 CHRONIC KIDNEY DISEASE, OR UNSPECIFIED CHRONIC KIDNEY DISEASE (HCC): ICD-10-CM

## 2022-11-21 DIAGNOSIS — E78.5 HYPERLIPIDEMIA, UNSPECIFIED HYPERLIPIDEMIA TYPE: ICD-10-CM

## 2022-11-21 DIAGNOSIS — F02.80 LATE ONSET ALZHEIMER'S DEMENTIA WITHOUT BEHAVIORAL DISTURBANCE (HCC): ICD-10-CM

## 2022-11-21 DIAGNOSIS — G30.1 LATE ONSET ALZHEIMER'S DEMENTIA WITHOUT BEHAVIORAL DISTURBANCE (HCC): ICD-10-CM

## 2022-11-21 PROCEDURE — 3074F SYST BP LT 130 MM HG: CPT | Performed by: INTERNAL MEDICINE

## 2022-11-21 PROCEDURE — 1123F ACP DISCUSS/DSCN MKR DOCD: CPT | Performed by: INTERNAL MEDICINE

## 2022-11-21 PROCEDURE — G0008 ADMIN INFLUENZA VIRUS VAC: HCPCS | Performed by: INTERNAL MEDICINE

## 2022-11-21 PROCEDURE — 3078F DIAST BP <80 MM HG: CPT | Performed by: INTERNAL MEDICINE

## 2022-11-21 PROCEDURE — 90694 VACC AIIV4 NO PRSRV 0.5ML IM: CPT | Performed by: INTERNAL MEDICINE

## 2022-11-21 PROCEDURE — 1111F DSCHRG MED/CURRENT MED MERGE: CPT | Performed by: INTERNAL MEDICINE

## 2022-11-21 PROCEDURE — 99214 OFFICE O/P EST MOD 30 MIN: CPT | Performed by: INTERNAL MEDICINE

## 2022-11-21 NOTE — PATIENT INSTRUCTIONS
Covid booster at pharmacy    Flu shot today and labs      Shingrix and Tdap at pharmacy after january
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
## 2022-11-28 RX ORDER — ATORVASTATIN CALCIUM 10 MG/1
TABLET, FILM COATED ORAL
Qty: 90 TABLET | Refills: 1 | Status: SHIPPED | OUTPATIENT
Start: 2022-11-28

## 2022-11-28 NOTE — TELEPHONE ENCOUNTER
Last appointment: 11/21/2022  Next appointment: Visit date not found  Last refill: 5/18/22  Appointment not due for >6 months.

## 2022-11-30 ENCOUNTER — TELEPHONE (OUTPATIENT)
Dept: INTERNAL MEDICINE CLINIC | Age: 87
End: 2022-11-30

## 2022-11-30 NOTE — TELEPHONE ENCOUNTER
Patient's son returning call for lab results due to patient having dementia. Son is on HIPPA; gave him the following message from Dr. Augusta Reyes:  \"Lab work is normal except for an increase in creatinine. It is unclear why this may have happened. Please repeat the blood test in the next 2 weeks. \"    Patient's son understood and has no additional questions.

## 2022-12-02 DIAGNOSIS — N18.30 STAGE 3 CHRONIC KIDNEY DISEASE, UNSPECIFIED WHETHER STAGE 3A OR 3B CKD (HCC): ICD-10-CM

## 2022-12-02 LAB
ANION GAP SERPL CALCULATED.3IONS-SCNC: 11 MMOL/L (ref 3–16)
BUN BLDV-MCNC: 17 MG/DL (ref 7–20)
CALCIUM SERPL-MCNC: 9.9 MG/DL (ref 8.3–10.6)
CHLORIDE BLD-SCNC: 102 MMOL/L (ref 99–110)
CO2: 26 MMOL/L (ref 21–32)
CREAT SERPL-MCNC: 1 MG/DL (ref 0.6–1.2)
GFR SERPL CREATININE-BSD FRML MDRD: 55 ML/MIN/{1.73_M2}
GLUCOSE BLD-MCNC: 89 MG/DL (ref 70–99)
POTASSIUM SERPL-SCNC: 4.1 MMOL/L (ref 3.5–5.1)
SODIUM BLD-SCNC: 139 MMOL/L (ref 136–145)

## 2023-03-14 RX ORDER — LEVOTHYROXINE SODIUM 0.05 MG/1
TABLET ORAL
Qty: 90 TABLET | Refills: 0 | Status: SHIPPED | OUTPATIENT
Start: 2023-03-14

## 2023-04-17 DIAGNOSIS — I10 ESSENTIAL HYPERTENSION: ICD-10-CM

## 2023-04-17 RX ORDER — LOSARTAN POTASSIUM 100 MG/1
TABLET ORAL
Qty: 90 TABLET | Refills: 0 | Status: SHIPPED | OUTPATIENT
Start: 2023-04-17

## 2023-06-19 RX ORDER — ATORVASTATIN CALCIUM 10 MG/1
10 TABLET, FILM COATED ORAL DAILY
Qty: 90 TABLET | Refills: 1 | Status: SHIPPED | OUTPATIENT
Start: 2023-06-19

## 2023-06-23 ENCOUNTER — OFFICE VISIT (OUTPATIENT)
Dept: INTERNAL MEDICINE CLINIC | Age: 88
End: 2023-06-23

## 2023-06-23 VITALS
SYSTOLIC BLOOD PRESSURE: 141 MMHG | BODY MASS INDEX: 27.91 KG/M2 | WEIGHT: 152.6 LBS | OXYGEN SATURATION: 98 % | HEART RATE: 60 BPM | DIASTOLIC BLOOD PRESSURE: 78 MMHG

## 2023-06-23 DIAGNOSIS — E03.9 HYPOTHYROIDISM, UNSPECIFIED TYPE: ICD-10-CM

## 2023-06-23 DIAGNOSIS — E03.9 HYPOTHYROIDISM, UNSPECIFIED TYPE: Primary | ICD-10-CM

## 2023-06-23 DIAGNOSIS — F02.80 LATE ONSET ALZHEIMER'S DEMENTIA WITHOUT BEHAVIORAL DISTURBANCE (HCC): ICD-10-CM

## 2023-06-23 DIAGNOSIS — E78.5 HYPERLIPIDEMIA, UNSPECIFIED HYPERLIPIDEMIA TYPE: ICD-10-CM

## 2023-06-23 DIAGNOSIS — G30.1 LATE ONSET ALZHEIMER'S DEMENTIA WITHOUT BEHAVIORAL DISTURBANCE (HCC): ICD-10-CM

## 2023-06-23 DIAGNOSIS — I13.0 HYPERTENSIVE HEART AND CHRONIC KIDNEY DISEASE WITH HEART FAILURE AND STAGE 1 THROUGH STAGE 4 CHRONIC KIDNEY DISEASE, OR UNSPECIFIED CHRONIC KIDNEY DISEASE (HCC): ICD-10-CM

## 2023-06-23 DIAGNOSIS — N18.30 STAGE 3 CHRONIC KIDNEY DISEASE, UNSPECIFIED WHETHER STAGE 3A OR 3B CKD (HCC): ICD-10-CM

## 2023-06-23 DIAGNOSIS — I10 ESSENTIAL HYPERTENSION: ICD-10-CM

## 2023-06-23 ASSESSMENT — PATIENT HEALTH QUESTIONNAIRE - PHQ9
SUM OF ALL RESPONSES TO PHQ9 QUESTIONS 1 & 2: 0
1. LITTLE INTEREST OR PLEASURE IN DOING THINGS: 0
2. FEELING DOWN, DEPRESSED OR HOPELESS: 0
SUM OF ALL RESPONSES TO PHQ QUESTIONS 1-9: 0

## 2023-06-24 LAB
ALBUMIN SERPL-MCNC: 4.7 G/DL (ref 3.4–5)
ALBUMIN/GLOB SERPL: 2.1 {RATIO} (ref 1.1–2.2)
ALP SERPL-CCNC: 94 U/L (ref 40–129)
ALT SERPL-CCNC: 12 U/L (ref 10–40)
ANION GAP SERPL CALCULATED.3IONS-SCNC: 13 MMOL/L (ref 3–16)
AST SERPL-CCNC: 26 U/L (ref 15–37)
BILIRUB SERPL-MCNC: 0.5 MG/DL (ref 0–1)
BUN SERPL-MCNC: 19 MG/DL (ref 7–20)
CALCIUM SERPL-MCNC: 9.7 MG/DL (ref 8.3–10.6)
CHLORIDE SERPL-SCNC: 101 MMOL/L (ref 99–110)
CHOLEST SERPL-MCNC: 195 MG/DL (ref 0–199)
CO2 SERPL-SCNC: 25 MMOL/L (ref 21–32)
CREAT SERPL-MCNC: 1.1 MG/DL (ref 0.6–1.2)
GFR SERPLBLD CREATININE-BSD FMLA CKD-EPI: 49 ML/MIN/{1.73_M2}
GLUCOSE SERPL-MCNC: 91 MG/DL (ref 70–99)
HDLC SERPL-MCNC: 78 MG/DL (ref 40–60)
LDLC SERPL CALC-MCNC: 102 MG/DL
POTASSIUM SERPL-SCNC: 4.2 MMOL/L (ref 3.5–5.1)
PROT SERPL-MCNC: 6.9 G/DL (ref 6.4–8.2)
SODIUM SERPL-SCNC: 139 MMOL/L (ref 136–145)
TRIGL SERPL-MCNC: 73 MG/DL (ref 0–150)
TSH SERPL DL<=0.005 MIU/L-ACNC: 3.15 UIU/ML (ref 0.27–4.2)
VLDLC SERPL CALC-MCNC: 15 MG/DL

## 2023-07-19 DIAGNOSIS — I10 ESSENTIAL HYPERTENSION: ICD-10-CM

## 2023-07-19 RX ORDER — LOSARTAN POTASSIUM 100 MG/1
TABLET ORAL
Qty: 90 TABLET | Refills: 1 | Status: SHIPPED | OUTPATIENT
Start: 2023-07-19

## 2023-08-18 RX ORDER — LEVOTHYROXINE SODIUM 0.05 MG/1
50 TABLET ORAL DAILY
Qty: 90 TABLET | Refills: 0 | Status: SHIPPED | OUTPATIENT
Start: 2023-08-18

## 2023-08-24 ENCOUNTER — TELEPHONE (OUTPATIENT)
Dept: INTERNAL MEDICINE CLINIC | Age: 88
End: 2023-08-24

## 2023-08-24 DIAGNOSIS — H93.19 TINNITUS, UNSPECIFIED LATERALITY: Primary | ICD-10-CM

## 2023-08-24 NOTE — TELEPHONE ENCOUNTER
Patient's son, Ginny Staton, is requesting a referral for a ENT. Patient is experiencing noise in her right ear. He says patient has dementia so it is difficult for to explain what is going on. He says the ear is in deep cleaning and an evaluation. He would like a referral to an ENT.

## 2023-09-06 ENCOUNTER — PATIENT MESSAGE (OUTPATIENT)
Dept: INTERNAL MEDICINE CLINIC | Age: 88
End: 2023-09-06

## 2023-10-20 ENCOUNTER — TELEPHONE (OUTPATIENT)
Dept: PHARMACY | Facility: CLINIC | Age: 88
End: 2023-10-20

## 2023-10-20 NOTE — TELEPHONE ENCOUNTER
[facility] CLINICAL PHARMACY: ADHERENCE REVIEW  Identified care gap per [employer]: fills at [facility]: Statin adherence    Patient also appears to be prescribed: ACE/ARB    ASSESSMENT    ACE/ARB ADHERENCE    Insurance Records claims through 10/7/23 (Prior Year [facility] = 75% - FAILED; YTD [facility] = 98%; Potential Fail Date: 23):   LOSARTAN POT TAB 100MG last filled on 23 for 90 day supply. Next refill due: 10/17/23    Prescribed si tablet/capsule daily    Per Reconcile Dispense History: 1 refill remains    BP Readings from Last 3 Encounters:   23 (!) 141/78   22 128/68   22 120/62     Estimated Creatinine Clearance: 33 mL/min (based on SCr of 1.1 mg/dL). Lab Results   Component Value Date    CREATININE 1.1 2023     Lab Results   Component Value Date    K 4.2 2023     STATIN ADHERENCE    Insurance Records claims through 10/7/23 (Prior Year [facility] = not reported; YTD [facility] = FIRST FILL; Potential Fail Date: 10/26/23):   ATORVASTATIN TAB 10MG last filled on 23 for 90 day supply. Next refill due: 23    Prescribed si tablet/capsule daily    Per Reconcile Dispense History and Insurer Portal: last 2 fills are 90ds 22 & 23; 1 refill remains    Lab Results   Component Value Date    CHOL 195 2023    TRIG 73 2023    HDL 78 (H) 2023    LDLCALC 102 (H) 2023     ALT   Date Value Ref Range Status   2023 12 10 - 40 U/L Final     AST   Date Value Ref Range Status   2023 26 15 - 37 U/L Final     The ASCVD Risk score (Marshall TOVAR, et al., 2019) failed to calculate for the following reasons:     The 2019 ASCVD risk score is only valid for ages 36 to 78     PLAN  Patient not found in Outcomes MTM    The following are interventions that have been identified:   Patient overdue refilling atorvastatin 10mg daily (last to fills were @6 months apart and refill was due @?) and active on home medication list.   Also due @now for losartan refill    Reached

## 2023-10-23 RX ORDER — AMLODIPINE BESYLATE 5 MG/1
5 TABLET ORAL DAILY
Qty: 90 TABLET | Refills: 1 | Status: SHIPPED | OUTPATIENT
Start: 2023-10-23

## 2023-11-15 RX ORDER — LEVOTHYROXINE SODIUM 0.05 MG/1
50 TABLET ORAL DAILY
Qty: 90 TABLET | Refills: 0 | Status: SHIPPED | OUTPATIENT
Start: 2023-11-15

## 2024-01-01 SDOH — HEALTH STABILITY: PHYSICAL HEALTH: ON AVERAGE, HOW MANY DAYS PER WEEK DO YOU ENGAGE IN MODERATE TO STRENUOUS EXERCISE (LIKE A BRISK WALK)?: 0 DAYS

## 2024-01-01 ASSESSMENT — LIFESTYLE VARIABLES
HOW OFTEN DO YOU HAVE A DRINK CONTAINING ALCOHOL: 1
HOW MANY STANDARD DRINKS CONTAINING ALCOHOL DO YOU HAVE ON A TYPICAL DAY: PATIENT DOES NOT DRINK
HOW OFTEN DO YOU HAVE A DRINK CONTAINING ALCOHOL: NEVER
HOW MANY STANDARD DRINKS CONTAINING ALCOHOL DO YOU HAVE ON A TYPICAL DAY: 0
HOW OFTEN DO YOU HAVE SIX OR MORE DRINKS ON ONE OCCASION: 1

## 2024-01-01 ASSESSMENT — PATIENT HEALTH QUESTIONNAIRE - PHQ9
SUM OF ALL RESPONSES TO PHQ QUESTIONS 1-9: 2
2. FEELING DOWN, DEPRESSED OR HOPELESS: 1
1. LITTLE INTEREST OR PLEASURE IN DOING THINGS: 1
SUM OF ALL RESPONSES TO PHQ9 QUESTIONS 1 & 2: 2
SUM OF ALL RESPONSES TO PHQ QUESTIONS 1-9: 2

## 2024-01-10 ENCOUNTER — OFFICE VISIT (OUTPATIENT)
Dept: INTERNAL MEDICINE CLINIC | Age: 89
End: 2024-01-10
Payer: MEDICARE

## 2024-01-10 VITALS
BODY MASS INDEX: 25.76 KG/M2 | HEART RATE: 59 BPM | DIASTOLIC BLOOD PRESSURE: 82 MMHG | WEIGHT: 140 LBS | OXYGEN SATURATION: 98 % | HEIGHT: 62 IN | SYSTOLIC BLOOD PRESSURE: 140 MMHG

## 2024-01-10 DIAGNOSIS — N18.30 STAGE 3 CHRONIC KIDNEY DISEASE, UNSPECIFIED WHETHER STAGE 3A OR 3B CKD (HCC): ICD-10-CM

## 2024-01-10 DIAGNOSIS — R63.4 ABNORMAL WEIGHT LOSS: ICD-10-CM

## 2024-01-10 DIAGNOSIS — F02.80 LATE ONSET ALZHEIMER'S DEMENTIA WITHOUT BEHAVIORAL DISTURBANCE (HCC): ICD-10-CM

## 2024-01-10 DIAGNOSIS — Z00.00 MEDICARE ANNUAL WELLNESS VISIT, SUBSEQUENT: Primary | ICD-10-CM

## 2024-01-10 DIAGNOSIS — G30.1 LATE ONSET ALZHEIMER'S DEMENTIA WITHOUT BEHAVIORAL DISTURBANCE (HCC): ICD-10-CM

## 2024-01-10 DIAGNOSIS — I13.0 HYPERTENSIVE HEART AND CHRONIC KIDNEY DISEASE WITH HEART FAILURE AND STAGE 1 THROUGH STAGE 4 CHRONIC KIDNEY DISEASE, OR UNSPECIFIED CHRONIC KIDNEY DISEASE (HCC): ICD-10-CM

## 2024-01-10 PROCEDURE — G0439 PPPS, SUBSEQ VISIT: HCPCS | Performed by: INTERNAL MEDICINE

## 2024-01-10 PROCEDURE — 1123F ACP DISCUSS/DSCN MKR DOCD: CPT | Performed by: INTERNAL MEDICINE

## 2024-01-10 NOTE — PATIENT INSTRUCTIONS
medicine taste in the mouth.  Follow-up care is a key part of your treatment and safety. Be sure to make and go to all appointments, and call your doctor if you are having problems. It's also a good idea to know your test results and keep a list of the medicines you take.  Where can you learn more?  Go to https://[email or web address].net/patientEd and enter G551 to learn more about \"Learning About Vision Tests.\"  Current as of: June 6, 2023               Content Version: 13.9  © 8334-5272 [employer].   Care instructions adapted under license by [employer]. If you have questions about a medical condition or this instruction, always ask your healthcare professional. [employer] disclaims any warranty or liability for your use of this information.           Learning About Activities of Daily Living  What are activities of daily living?     Activities of daily living (ADLs) are the basic self-care tasks you do every day. These include eating, bathing, dressing, and moving around.  As you age, and if you have health problems, you may find that it's harder to do some of these tasks. If so, your doctor can suggest ideas that may help.  To measure what kind of help you may need, your doctor will ask how well you are able to do ADLs. Let your doctor know if there are any tasks that you are having trouble doing. This is an important first step to getting help. And when you have the help you need, you can stay as independent as possible.  How will a doctor assess your ADLs?  Asking about ADLs is part of a routine health checkup your doctor will likely do as you age. Your health check might be done in a doctor's office, in your home, or at a hospital. The goal is to find out if you are having any problems that could make it hard to care for yourself or that make it unsafe for you to be on your own.  To measure your ADLs, your doctor will ask how hard it is for you to do routine tasks. Your doctor may also

## 2024-01-10 NOTE — PROGRESS NOTES
Medicare Annual Wellness Visit    Doreen Gilbert is here for Medicare AWV    Assessment & Plan   Medicare annual wellness visit, subsequent  Counseled on vaccines  Abnormal weight loss  Suspect this relates to patient's underlying Alzheimer's where she has lost some interest in eating and only eats when meals are provided, without snacking.  -     Comprehensive Metabolic Panel; Future  -     TSH; Future  -     CBC with Auto Differential; Future  -     Sedimentation Rate; Future  Late onset Alzheimer's dementia without behavioral disturbance (HCC)  Stable off medication, discussed with sister that if having increased \"anger\" we might consider low-dose antipsychotic medication  Hypertensive heart and chronic kidney disease with heart failure and stage 1 through stage 4 chronic kidney disease, or unspecified chronic kidney disease (HCC)  Mildly elevated today.  Unclear if she may have missed her medications which are amlodipine and losartan.  Stage 3 chronic kidney disease, unspecified whether stage 3a or 3b CKD (HCC)  GFR has remained stable on recent lab checks  Hypothyroidism, unspecified type  Stable on levothyroxine  Hyperlipidemia, unspecified hyperlipidemia type  Stable on atorvastatin    Recommendations for Preventive Services Due: see orders and patient instructions/AVS.  Recommended screening schedule for the next 5-10 years is provided to the patient in written form: see Patient Instructions/AVS.       Return in about 6 months (around 7/10/2024).     Subjective       Patient is here with her sister, not her son.  He did send a note updating as on her right knee [facility]  She has also seen her dermatologist, Dr. Vo.  He states that she complains of occasional right-sided headaches and right hip pain.  She has no complaints with me today.  He states that she sometimes refuses to take her medication.  He states she does not often shower but will wash herself.

## 2024-01-11 LAB
ALBUMIN SERPL-MCNC: 4.4 G/DL (ref 3.4–5)
ALBUMIN/GLOB SERPL: 2 {RATIO} (ref 1.1–2.2)
ALP SERPL-CCNC: 76 U/L (ref 40–129)
ALT SERPL-CCNC: 9 U/L (ref 10–40)
ANION GAP SERPL CALCULATED.3IONS-SCNC: 11 MMOL/L (ref 3–16)
AST SERPL-CCNC: 18 U/L (ref 15–37)
BASOPHILS # BLD: 0.1 K/UL (ref 0–0.2)
BASOPHILS NFR BLD: 1.2 %
BILIRUB SERPL-MCNC: 0.5 MG/DL (ref 0–1)
BUN SERPL-MCNC: 21 MG/DL (ref 7–20)
CALCIUM SERPL-MCNC: 9.6 MG/DL (ref 8.3–10.6)
CHLORIDE SERPL-SCNC: 105 MMOL/L (ref 99–110)
CO2 SERPL-SCNC: 26 MMOL/L (ref 21–32)
CREAT SERPL-MCNC: 1.1 MG/DL (ref 0.6–1.2)
DEPRECATED RDW RBC AUTO: 15.6 % (ref 12.4–15.4)
EOSINOPHIL # BLD: 0.1 K/UL (ref 0–0.6)
EOSINOPHIL NFR BLD: 1.5 %
ERYTHROCYTE [SEDIMENTATION RATE] IN BLOOD BY WESTERGREN METHOD: 8 MM/HR (ref 0–30)
GFR SERPLBLD CREATININE-BSD FMLA CKD-EPI: 49 ML/MIN/{1.73_M2}
GLUCOSE SERPL-MCNC: 95 MG/DL (ref 70–99)
HCT VFR BLD AUTO: 40.8 % (ref 36–48)
HGB BLD-MCNC: 13.4 G/DL (ref 12–16)
LYMPHOCYTES # BLD: 2.1 K/UL (ref 1–5.1)
LYMPHOCYTES NFR BLD: 30.9 %
MCH RBC QN AUTO: 28.5 PG (ref 26–34)
MCHC RBC AUTO-ENTMCNC: 32.9 G/DL (ref 31–36)
MCV RBC AUTO: 86.6 FL (ref 80–100)
MONOCYTES # BLD: 0.7 K/UL (ref 0–1.3)
MONOCYTES NFR BLD: 9.8 %
NEUTROPHILS # BLD: 3.9 K/UL (ref 1.7–7.7)
NEUTROPHILS NFR BLD: 56.6 %
PLATELET # BLD AUTO: ABNORMAL K/UL (ref 135–450)
PMV BLD AUTO: ABNORMAL FL (ref 5–10.5)
POTASSIUM SERPL-SCNC: 4 MMOL/L (ref 3.5–5.1)
PROT SERPL-MCNC: 6.6 G/DL (ref 6.4–8.2)
RBC # BLD AUTO: 4.71 M/UL (ref 4–5.2)
RBC MORPH BLD: ABNORMAL
SODIUM SERPL-SCNC: 142 MMOL/L (ref 136–145)
TSH SERPL DL<=0.005 MIU/L-ACNC: 3.26 UIU/ML (ref 0.27–4.2)
WBC # BLD AUTO: 6.9 K/UL (ref 4–11)

## 2024-01-12 ENCOUNTER — TELEPHONE (OUTPATIENT)
Dept: INTERNAL MEDICINE CLINIC | Age: 89
End: 2024-01-12

## 2024-01-12 DIAGNOSIS — E03.9 HYPOTHYROIDISM, UNSPECIFIED TYPE: Primary | ICD-10-CM

## 2024-01-12 DIAGNOSIS — I10 ESSENTIAL HYPERTENSION: ICD-10-CM

## 2024-01-12 NOTE — TELEPHONE ENCOUNTER
----- Message from Kamla Leonard sent at 1/12/2024  3:05 PM EST -----  Subject: Referral Request    Reason for referral request? Patient needs blood work orders for her   upcoming appt in July.   Provider patient wants to be referred to(if known):     Provider Phone Number(if known):    Additional Information for Provider?   ---------------------------------------------------------------------------  --------------  CALL BACK INFO    [phone]; OK to leave message on voicemail  ---------------------------------------------------------------------------  --------------

## 2024-01-16 ENCOUNTER — TELEPHONE (OUTPATIENT)
Dept: INTERNAL MEDICINE CLINIC | Age: 89
End: 2024-01-16

## 2024-01-16 DIAGNOSIS — I10 ESSENTIAL HYPERTENSION: ICD-10-CM

## 2024-01-16 RX ORDER — LOSARTAN POTASSIUM 100 MG/1
TABLET ORAL
Qty: 90 TABLET | Refills: 1 | Status: SHIPPED | OUTPATIENT
Start: 2024-01-16

## 2024-01-16 NOTE — TELEPHONE ENCOUNTER
Left a message. Was trying to call and get a phone visit done this afternoon, since we hadn't heard back from patient   Offered her to do an E visit today/tomorrow or call back the office for overflow clinic appt tomorrow.

## 2024-01-16 NOTE — TELEPHONE ENCOUNTER
Patient's Son, Parth, is calling on patient's behalf with concern of cough/runny nose x 3 days. Patient has surgery on 1/26/24 for tooth extraction and her Son is concerned if she will still be able to have this done if she is sick. Please contact Parth to advise.    How many days? 3 days  Cough? Yes  Nasal Drainage? yes   Bringing up Phlegm?yes - Greenish  Sore throat?Yes  Headache?No  Ear Pain?No  SOB? No          Wheezing?No  Head Congestion?Yes  Chest Congestion?Yes  Body Aches?No  Vomiting?No  Diarrhea?No  Temp? No  If so, highest temp:  Treatment so far? OTC Products     ----- Message from Radha Velazquez sent at 1/16/2024 12:12 PM EST -----  Subject: Appointment Request    Reason for Call: Established Patient Appointment needed: Semi-Routine   Cough, Cold Symptoms    QUESTIONS    Reason for appointment request? No appointments available during search     Additional Information for Provider? Patient has cold symptoms, cough,   runny nose that has been going on for three days, usually turns into   bronchitis, looking for an appointment, please advise   ---------------------------------------------------------------------------  --------------  CALL BACK INFO  [phone]; OK to leave message on voicemail  ---------------------------------------------------------------------------  --------------  SCRIPT ANSWERS

## 2024-01-16 NOTE — TELEPHONE ENCOUNTER
----- Message from Radha Marcus sent at 1/16/2024 12:12 PM EST -----  Subject: Appointment Request    Reason for Call: Established Patient Appointment needed: Semi-Routine   Cough, Cold Symptoms    QUESTIONS    Reason for appointment request? No appointments available during search     Additional Information for Provider? Patient has cold symptoms, cough,   runny nose that has been going on for three days, usually turns into   bronchitis, looking for an appointment, please advise   ---------------------------------------------------------------------------  --------------  CALL BACK INFO  [phone]; OK to leave message on voicemail  ---------------------------------------------------------------------------  --------------  SCRIPT ANSWERS

## 2024-02-12 RX ORDER — LEVOTHYROXINE SODIUM 0.05 MG/1
50 TABLET ORAL DAILY
Qty: 90 TABLET | Refills: 0 | Status: SHIPPED | OUTPATIENT
Start: 2024-02-12

## 2024-02-12 NOTE — TELEPHONE ENCOUNTER
Refill Request     Last Seen: 1/10/2024    Last Written: 11/15/23      Next Appointment:   Future Appointments   Date Time Provider Department Center   7/11/2024 10:00 AM Odalys Dale MD [facility] [place] [facility]             Requested Prescriptions     Pending Prescriptions Disp Refills    levothyroxine (SYNTHROID) 50 MCG tablet [Pharmacy Med Name: LEVOTHYROXINE 50 MCG TABLET] 90 tablet 0     Sig: TAKE 1 TABLET BY MOUTH EVERY DAY     11/15/23

## 2024-03-24 ENCOUNTER — APPOINTMENT (OUTPATIENT)
Dept: CT IMAGING | Age: 89
DRG: 083 | End: 2024-03-24
Payer: MEDICARE

## 2024-03-24 ENCOUNTER — HOSPITAL ENCOUNTER (INPATIENT)
Age: 89
LOS: 3 days | Discharge: NURSING, REHAB OR LONG-TERM CARE | DRG: 083 | End: 2024-03-27
Attending: STUDENT IN AN ORGANIZED HEALTH CARE EDUCATION/TRAINING PROGRAM | Admitting: INTERNAL MEDICINE
Payer: MEDICARE

## 2024-03-24 ENCOUNTER — APPOINTMENT (OUTPATIENT)
Dept: GENERAL RADIOLOGY | Age: 89
DRG: 083 | End: 2024-03-24
Payer: MEDICARE

## 2024-03-24 DIAGNOSIS — R53.1 ACUTE LEFT-SIDED WEAKNESS: ICD-10-CM

## 2024-03-24 DIAGNOSIS — I62.9 INTRACRANIAL HEMORRHAGE (HCC): Primary | ICD-10-CM

## 2024-03-24 DIAGNOSIS — R44.9 LEFT-SIDED SENSORY DEFICIT PRESENT: ICD-10-CM

## 2024-03-24 PROBLEM — I61.9 INTRAPARENCHYMAL HEMORRHAGE OF BRAIN (HCC): Status: ACTIVE | Noted: 2024-03-24

## 2024-03-24 LAB
ALBUMIN SERPL-MCNC: 4.2 G/DL (ref 3.4–5)
ALBUMIN/GLOB SERPL: 1.6 {RATIO} (ref 1.1–2.2)
ALP SERPL-CCNC: 83 U/L (ref 40–129)
ALT SERPL-CCNC: 9 U/L (ref 10–40)
ANION GAP SERPL CALCULATED.3IONS-SCNC: 12 MMOL/L (ref 3–16)
AST SERPL-CCNC: 19 U/L (ref 15–37)
BACTERIA URNS QL MICRO: ABNORMAL /HPF
BASOPHILS # BLD: 0.1 K/UL (ref 0–0.2)
BASOPHILS NFR BLD: 1.1 %
BILIRUB SERPL-MCNC: 0.3 MG/DL (ref 0–1)
BILIRUB UR QL STRIP.AUTO: NEGATIVE
BUN SERPL-MCNC: 27 MG/DL (ref 7–20)
CALCIUM SERPL-MCNC: 9.5 MG/DL (ref 8.3–10.6)
CHLORIDE SERPL-SCNC: 100 MMOL/L (ref 99–110)
CLARITY UR: CLEAR
CO2 SERPL-SCNC: 23 MMOL/L (ref 21–32)
COLOR UR: YELLOW
CREAT SERPL-MCNC: 1.2 MG/DL (ref 0.6–1.2)
DEPRECATED RDW RBC AUTO: 15.1 % (ref 12.4–15.4)
EOSINOPHIL # BLD: 0.1 K/UL (ref 0–0.6)
EOSINOPHIL NFR BLD: 1.5 %
EPI CELLS #/AREA URNS HPF: ABNORMAL /HPF (ref 0–5)
GFR SERPLBLD CREATININE-BSD FMLA CKD-EPI: 44 ML/MIN/{1.73_M2}
GLUCOSE BLD-MCNC: 98 MG/DL (ref 70–99)
GLUCOSE SERPL-MCNC: 97 MG/DL (ref 70–99)
GLUCOSE UR STRIP.AUTO-MCNC: NEGATIVE MG/DL
HCT VFR BLD AUTO: 36.7 % (ref 36–48)
HGB BLD-MCNC: 12.2 G/DL (ref 12–16)
HGB UR QL STRIP.AUTO: NEGATIVE
INR PPP: 0.96 (ref 0.84–1.16)
KETONES UR STRIP.AUTO-MCNC: NEGATIVE MG/DL
LEUKOCYTE ESTERASE UR QL STRIP.AUTO: ABNORMAL
LYMPHOCYTES # BLD: 2.6 K/UL (ref 1–5.1)
LYMPHOCYTES NFR BLD: 31.9 %
MCH RBC QN AUTO: 28.6 PG (ref 26–34)
MCHC RBC AUTO-ENTMCNC: 33.2 G/DL (ref 31–36)
MCV RBC AUTO: 86.3 FL (ref 80–100)
MONOCYTES # BLD: 0.9 K/UL (ref 0–1.3)
MONOCYTES NFR BLD: 11.2 %
NEUTROPHILS # BLD: 4.4 K/UL (ref 1.7–7.7)
NEUTROPHILS NFR BLD: 54.3 %
NITRITE UR QL STRIP.AUTO: NEGATIVE
PERFORMED ON: NORMAL
PH UR STRIP.AUTO: 6.5 [PH] (ref 5–8)
PLATELET # BLD AUTO: 175 K/UL (ref 135–450)
PMV BLD AUTO: 10.4 FL (ref 5–10.5)
POTASSIUM SERPL-SCNC: 4.2 MMOL/L (ref 3.5–5.1)
PROT SERPL-MCNC: 6.9 G/DL (ref 6.4–8.2)
PROT UR STRIP.AUTO-MCNC: NEGATIVE MG/DL
PROTHROMBIN TIME: 12.8 SEC (ref 11.5–14.8)
RBC # BLD AUTO: 4.25 M/UL (ref 4–5.2)
RBC #/AREA URNS HPF: ABNORMAL /HPF (ref 0–4)
SODIUM SERPL-SCNC: 135 MMOL/L (ref 136–145)
SP GR UR STRIP.AUTO: 1.01 (ref 1–1.03)
TROPONIN, HIGH SENSITIVITY: 13 NG/L (ref 0–14)
UA COMPLETE W REFLEX CULTURE PNL UR: YES
UA DIPSTICK W REFLEX MICRO PNL UR: YES
URN SPEC COLLECT METH UR: ABNORMAL
UROBILINOGEN UR STRIP-ACNC: 0.2 E.U./DL
WBC # BLD AUTO: 8.1 K/UL (ref 4–11)
WBC #/AREA URNS HPF: ABNORMAL /HPF (ref 0–5)

## 2024-03-24 PROCEDURE — 6360000004 HC RX CONTRAST MEDICATION

## 2024-03-24 PROCEDURE — 84484 ASSAY OF TROPONIN QUANT: CPT

## 2024-03-24 PROCEDURE — 2000000000 HC ICU R&B

## 2024-03-24 PROCEDURE — 74176 CT ABD & PELVIS W/O CONTRAST: CPT

## 2024-03-24 PROCEDURE — 36415 COLL VENOUS BLD VENIPUNCTURE: CPT

## 2024-03-24 PROCEDURE — 93005 ELECTROCARDIOGRAM TRACING: CPT

## 2024-03-24 PROCEDURE — 6360000002 HC RX W HCPCS

## 2024-03-24 PROCEDURE — 70498 CT ANGIOGRAPHY NECK: CPT

## 2024-03-24 PROCEDURE — 4A03X5D MEASUREMENT OF ARTERIAL FLOW, INTRACRANIAL, EXTERNAL APPROACH: ICD-10-PCS | Performed by: INTERNAL MEDICINE

## 2024-03-24 PROCEDURE — 2580000003 HC RX 258

## 2024-03-24 PROCEDURE — 85610 PROTHROMBIN TIME: CPT

## 2024-03-24 PROCEDURE — 99291 CRITICAL CARE FIRST HOUR: CPT

## 2024-03-24 PROCEDURE — 70450 CT HEAD/BRAIN W/O DYE: CPT

## 2024-03-24 PROCEDURE — 80053 COMPREHEN METABOLIC PANEL: CPT

## 2024-03-24 PROCEDURE — 81001 URINALYSIS AUTO W/SCOPE: CPT

## 2024-03-24 PROCEDURE — 99285 EMERGENCY DEPT VISIT HI MDM: CPT

## 2024-03-24 PROCEDURE — 85025 COMPLETE CBC W/AUTO DIFF WBC: CPT

## 2024-03-24 PROCEDURE — 71045 X-RAY EXAM CHEST 1 VIEW: CPT

## 2024-03-24 PROCEDURE — 96374 THER/PROPH/DIAG INJ IV PUSH: CPT

## 2024-03-24 RX ORDER — LEVETIRACETAM 500 MG/5ML
500 INJECTION, SOLUTION, CONCENTRATE INTRAVENOUS ONCE
Status: DISCONTINUED | OUTPATIENT
Start: 2024-03-24 | End: 2024-03-24

## 2024-03-24 RX ORDER — LABETALOL HYDROCHLORIDE 5 MG/ML
10 INJECTION, SOLUTION INTRAVENOUS ONCE
Status: DISCONTINUED | OUTPATIENT
Start: 2024-03-24 | End: 2024-03-25 | Stop reason: ALTCHOICE

## 2024-03-24 RX ORDER — ACETAMINOPHEN 500 MG
1000 TABLET ORAL ONCE
Status: DISCONTINUED | OUTPATIENT
Start: 2024-03-24 | End: 2024-03-24

## 2024-03-24 RX ORDER — LEVETIRACETAM 500 MG/5ML
500 INJECTION, SOLUTION, CONCENTRATE INTRAVENOUS EVERY 12 HOURS
Status: DISCONTINUED | OUTPATIENT
Start: 2024-03-24 | End: 2024-03-26

## 2024-03-24 RX ORDER — LEVETIRACETAM 250 MG/1
500 TABLET ORAL 2 TIMES DAILY
Status: DISCONTINUED | OUTPATIENT
Start: 2024-03-24 | End: 2024-03-24

## 2024-03-24 RX ADMIN — IOPAMIDOL 75 ML: 755 INJECTION, SOLUTION INTRAVENOUS at 21:20

## 2024-03-24 RX ADMIN — SODIUM CHLORIDE 5 MG/HR: 9 INJECTION, SOLUTION INTRAVENOUS at 22:58

## 2024-03-24 RX ADMIN — LEVETIRACETAM 500 MG: 500 INJECTION INTRAVENOUS at 23:59

## 2024-03-25 ENCOUNTER — APPOINTMENT (OUTPATIENT)
Dept: CT IMAGING | Age: 89
DRG: 083 | End: 2024-03-25
Payer: MEDICARE

## 2024-03-25 PROBLEM — I62.9 INTRACRANIAL HEMORRHAGE (HCC): Status: ACTIVE | Noted: 2024-03-25

## 2024-03-25 LAB
ANION GAP SERPL CALCULATED.3IONS-SCNC: 14 MMOL/L (ref 3–16)
BASOPHILS # BLD: 0.1 K/UL (ref 0–0.2)
BASOPHILS NFR BLD: 0.7 %
BUN SERPL-MCNC: 21 MG/DL (ref 7–20)
CALCIUM SERPL-MCNC: 9.7 MG/DL (ref 8.3–10.6)
CHLORIDE SERPL-SCNC: 100 MMOL/L (ref 99–110)
CO2 SERPL-SCNC: 24 MMOL/L (ref 21–32)
CREAT SERPL-MCNC: 0.9 MG/DL (ref 0.6–1.2)
DEPRECATED RDW RBC AUTO: 15.4 % (ref 12.4–15.4)
EKG ATRIAL RATE: 80 BPM
EKG DIAGNOSIS: NORMAL
EKG P AXIS: 31 DEGREES
EKG P-R INTERVAL: 188 MS
EKG Q-T INTERVAL: 378 MS
EKG QRS DURATION: 84 MS
EKG QTC CALCULATION (BAZETT): 435 MS
EKG R AXIS: 59 DEGREES
EKG T AXIS: -20 DEGREES
EKG VENTRICULAR RATE: 80 BPM
EOSINOPHIL # BLD: 0 K/UL (ref 0–0.6)
EOSINOPHIL NFR BLD: 0.2 %
GFR SERPLBLD CREATININE-BSD FMLA CKD-EPI: 62 ML/MIN/{1.73_M2}
GLUCOSE SERPL-MCNC: 122 MG/DL (ref 70–99)
HCT VFR BLD AUTO: 37 % (ref 36–48)
HGB BLD-MCNC: 12.3 G/DL (ref 12–16)
LYMPHOCYTES # BLD: 1.3 K/UL (ref 1–5.1)
LYMPHOCYTES NFR BLD: 11 %
MAGNESIUM SERPL-MCNC: 2 MG/DL (ref 1.8–2.4)
MCH RBC QN AUTO: 28.7 PG (ref 26–34)
MCHC RBC AUTO-ENTMCNC: 33.1 G/DL (ref 31–36)
MCV RBC AUTO: 86.7 FL (ref 80–100)
MONOCYTES # BLD: 0.8 K/UL (ref 0–1.3)
MONOCYTES NFR BLD: 6.5 %
NEUTROPHILS # BLD: 9.9 K/UL (ref 1.7–7.7)
NEUTROPHILS NFR BLD: 81.6 %
PLATELET # BLD AUTO: ABNORMAL K/UL (ref 135–450)
PLATELET BLD QL SMEAR: ABNORMAL
PMV BLD AUTO: ABNORMAL FL (ref 5–10.5)
POTASSIUM SERPL-SCNC: 3.8 MMOL/L (ref 3.5–5.1)
RBC # BLD AUTO: 4.27 M/UL (ref 4–5.2)
SLIDE REVIEW: ABNORMAL
SODIUM SERPL-SCNC: 138 MMOL/L (ref 136–145)
WBC # BLD AUTO: 12.2 K/UL (ref 4–11)

## 2024-03-25 PROCEDURE — 70450 CT HEAD/BRAIN W/O DYE: CPT

## 2024-03-25 PROCEDURE — 97535 SELF CARE MNGMENT TRAINING: CPT

## 2024-03-25 PROCEDURE — 97166 OT EVAL MOD COMPLEX 45 MIN: CPT

## 2024-03-25 PROCEDURE — 6370000000 HC RX 637 (ALT 250 FOR IP)

## 2024-03-25 PROCEDURE — 99233 SBSQ HOSP IP/OBS HIGH 50: CPT | Performed by: STUDENT IN AN ORGANIZED HEALTH CARE EDUCATION/TRAINING PROGRAM

## 2024-03-25 PROCEDURE — 36415 COLL VENOUS BLD VENIPUNCTURE: CPT

## 2024-03-25 PROCEDURE — 83735 ASSAY OF MAGNESIUM: CPT

## 2024-03-25 PROCEDURE — 97116 GAIT TRAINING THERAPY: CPT

## 2024-03-25 PROCEDURE — 83036 HEMOGLOBIN GLYCOSYLATED A1C: CPT

## 2024-03-25 PROCEDURE — 97530 THERAPEUTIC ACTIVITIES: CPT

## 2024-03-25 PROCEDURE — 92526 ORAL FUNCTION THERAPY: CPT

## 2024-03-25 PROCEDURE — 2580000003 HC RX 258

## 2024-03-25 PROCEDURE — 97163 PT EVAL HIGH COMPLEX 45 MIN: CPT

## 2024-03-25 PROCEDURE — 87086 URINE CULTURE/COLONY COUNT: CPT

## 2024-03-25 PROCEDURE — 6360000002 HC RX W HCPCS

## 2024-03-25 PROCEDURE — 51701 INSERT BLADDER CATHETER: CPT

## 2024-03-25 PROCEDURE — 2000000000 HC ICU R&B

## 2024-03-25 PROCEDURE — 99291 CRITICAL CARE FIRST HOUR: CPT | Performed by: INTERNAL MEDICINE

## 2024-03-25 PROCEDURE — 80061 LIPID PANEL: CPT

## 2024-03-25 PROCEDURE — 85025 COMPLETE CBC W/AUTO DIFF WBC: CPT

## 2024-03-25 PROCEDURE — 87077 CULTURE AEROBIC IDENTIFY: CPT

## 2024-03-25 PROCEDURE — 92610 EVALUATE SWALLOWING FUNCTION: CPT

## 2024-03-25 PROCEDURE — 51798 US URINE CAPACITY MEASURE: CPT

## 2024-03-25 PROCEDURE — 87186 SC STD MICRODIL/AGAR DIL: CPT

## 2024-03-25 PROCEDURE — 80048 BASIC METABOLIC PNL TOTAL CA: CPT

## 2024-03-25 RX ORDER — SODIUM CHLORIDE 0.9 % (FLUSH) 0.9 %
5-40 SYRINGE (ML) INJECTION PRN
Status: DISCONTINUED | OUTPATIENT
Start: 2024-03-25 | End: 2024-03-27 | Stop reason: HOSPADM

## 2024-03-25 RX ORDER — ACETAMINOPHEN 325 MG/1
650 TABLET ORAL EVERY 4 HOURS PRN
Status: DISCONTINUED | OUTPATIENT
Start: 2024-03-25 | End: 2024-03-27 | Stop reason: HOSPADM

## 2024-03-25 RX ORDER — ATORVASTATIN CALCIUM 10 MG/1
10 TABLET, FILM COATED ORAL DAILY
Status: DISCONTINUED | OUTPATIENT
Start: 2024-03-25 | End: 2024-03-27 | Stop reason: HOSPADM

## 2024-03-25 RX ORDER — ONDANSETRON 2 MG/ML
4 INJECTION INTRAMUSCULAR; INTRAVENOUS EVERY 6 HOURS PRN
Status: DISCONTINUED | OUTPATIENT
Start: 2024-03-25 | End: 2024-03-27 | Stop reason: HOSPADM

## 2024-03-25 RX ORDER — LABETALOL HYDROCHLORIDE 5 MG/ML
10 INJECTION, SOLUTION INTRAVENOUS EVERY 4 HOURS PRN
Status: DISCONTINUED | OUTPATIENT
Start: 2024-03-25 | End: 2024-03-27 | Stop reason: HOSPADM

## 2024-03-25 RX ORDER — ONDANSETRON 4 MG/1
4 TABLET, ORALLY DISINTEGRATING ORAL EVERY 8 HOURS PRN
Status: DISCONTINUED | OUTPATIENT
Start: 2024-03-25 | End: 2024-03-27 | Stop reason: HOSPADM

## 2024-03-25 RX ORDER — AMLODIPINE BESYLATE 5 MG/1
5 TABLET ORAL DAILY
Status: DISCONTINUED | OUTPATIENT
Start: 2024-03-25 | End: 2024-03-27 | Stop reason: HOSPADM

## 2024-03-25 RX ORDER — SODIUM CHLORIDE 0.9 % (FLUSH) 0.9 %
5-40 SYRINGE (ML) INJECTION EVERY 12 HOURS SCHEDULED
Status: DISCONTINUED | OUTPATIENT
Start: 2024-03-25 | End: 2024-03-27 | Stop reason: HOSPADM

## 2024-03-25 RX ORDER — HALOPERIDOL 5 MG/ML
2 INJECTION INTRAMUSCULAR ONCE
Status: COMPLETED | OUTPATIENT
Start: 2024-03-25 | End: 2024-03-25

## 2024-03-25 RX ORDER — SODIUM CHLORIDE 9 MG/ML
INJECTION, SOLUTION INTRAVENOUS PRN
Status: DISCONTINUED | OUTPATIENT
Start: 2024-03-25 | End: 2024-03-27 | Stop reason: HOSPADM

## 2024-03-25 RX ORDER — LOSARTAN POTASSIUM 100 MG/1
100 TABLET ORAL DAILY
Status: DISCONTINUED | OUTPATIENT
Start: 2024-03-25 | End: 2024-03-27 | Stop reason: HOSPADM

## 2024-03-25 RX ORDER — LEVOTHYROXINE SODIUM 0.05 MG/1
50 TABLET ORAL DAILY
Status: DISCONTINUED | OUTPATIENT
Start: 2024-03-25 | End: 2024-03-27 | Stop reason: HOSPADM

## 2024-03-25 RX ADMIN — CEFTRIAXONE 1000 MG: 1 INJECTION, POWDER, FOR SOLUTION INTRAMUSCULAR; INTRAVENOUS at 02:16

## 2024-03-25 RX ADMIN — ATORVASTATIN CALCIUM 10 MG: 10 TABLET, FILM COATED ORAL at 11:47

## 2024-03-25 RX ADMIN — LEVOTHYROXINE SODIUM 50 MCG: 50 TABLET ORAL at 08:57

## 2024-03-25 RX ADMIN — HALOPERIDOL LACTATE 2 MG: 5 INJECTION, SOLUTION INTRAMUSCULAR at 03:04

## 2024-03-25 RX ADMIN — AMLODIPINE BESYLATE 5 MG: 5 TABLET ORAL at 11:47

## 2024-03-25 RX ADMIN — LOSARTAN POTASSIUM 100 MG: 100 TABLET, FILM COATED ORAL at 11:47

## 2024-03-25 RX ADMIN — LEVETIRACETAM 500 MG: 500 INJECTION INTRAVENOUS at 11:47

## 2024-03-25 RX ADMIN — SODIUM CHLORIDE, PRESERVATIVE FREE 10 ML: 5 INJECTION INTRAVENOUS at 08:57

## 2024-03-25 ASSESSMENT — PAIN SCALES - PAIN ASSESSMENT IN ADVANCED DEMENTIA (PAINAD)
TOTALSCORE: 0
CONSOLABILITY: NO NEED TO CONSOLE
CONSOLABILITY: NO NEED TO CONSOLE
TOTALSCORE: 0
BODYLANGUAGE: RELAXED
TOTALSCORE: 0
CONSOLABILITY: NO NEED TO CONSOLE
FACIALEXPRESSION: SMILING OR INEXPRESSIVE
FACIALEXPRESSION: SMILING OR INEXPRESSIVE
BODYLANGUAGE: RELAXED
BREATHING: NORMAL
FACIALEXPRESSION: SMILING OR INEXPRESSIVE
BREATHING: NORMAL
CONSOLABILITY: NO NEED TO CONSOLE
FACIALEXPRESSION: SMILING OR INEXPRESSIVE
BODYLANGUAGE: RELAXED
BREATHING: NORMAL
BODYLANGUAGE: RELAXED
BREATHING: NORMAL
TOTALSCORE: 0

## 2024-03-25 NOTE — PROGRESS NOTES
Pending)         Assessment/Plan:   Doreen Gilbert is an 89 y.o. female, who presented with left-sided weakness and was found to have a right frontal parietal parenchymal and subarachnoid hemorrhage with mild vasogenic edema.     Right frontal parietal parenchymal hemorrhage  Subarachnoid hemorrhage  Patient presenting with new onset left-sided weakness.  BP was elevated and parenchymal bleed present.    LKW approximately 8pm on 3/24.  Patient's baseline is not fully oriented as she has Alzheimer's.  Neurocritical care consulted and following.  F/u neurosurgery recs.  Keppra 500mg BID for seizure prophylaxis.   S/p cardene drip  Goal -160.  Every 1 hour neurochecks.  Stability CT head w/o contrast: stable  MRI brain and MRV head w/ and w/o contrast ordered.  DVT prophylaxis with SCDs.  PT/OT/Speech evals ordered.   Aspiration precautions.     UTI  Patient experiencing suprapubic tenderness and urinary frequency.  UA showed moderate leukocyte estrace, negative nitrate, WBC 10-20, 4+ bacteria.   F/u urine culture  Ceftriaxone 1g daily started.      HTN  Patient not always compliant with home meds.  She has Alzheimer's and some days will not cooperate with taking her medication.   Home meds are amlodipine 5 mg and losartan 100 mg.  Patient currently NPO; pending swallow eval     CKD stage 3  Baseline Cr 1.1 and 1.2 on admission.   Daily BMP   Maintain MAP > 65 mmHg  Avoid nephrotoxic agents      Hypothyroidism  Last TSH 1/10/24 was 3.26.  Continue home Synthroid 50mcg.        Code Status: DNR-CCA  FEN: Diet NPO   PPX:  SCDs  DISPO: ICU    Jonathan Blumberg, MD, PGY-1  Internal Medicine Resident  Contact via [employer]  03/25/24  9:11 AM    This patient has been staffed and discussed with Dr. Romano.    PULMONARY AND CRITICAL CARE ATTENDING:  Patient was seen, examined and discussed with Dr. Blumberg PGY-1. I agree with the history of present illness, past medical/surgical histories, family history, social  history, medication list and allergies as listed. The review of systems is as noted above. My physical exam confirms the findings listed above.   Chart was reviewed including Labs, CXR, CT scan, EKG, and Medical records confirm the findings noted above.   I edited the note where appropriate.    Briefly, this is a 89 y.o. female admitted to ICU with acute onset left-sided weakness, on evaluation in the ED patient was noted to be hypertensive, CT head showed right parietal parenchymal and subarachnoid hemorrhage with mild vasogenic edema and small right tentorial subdural hemorrhage.  Repeat CT was stable. Off nicardipine at the moment. Confused.  On Keppra      Intake/Output Summary (Last 24 hours) at 3/25/2024 1005  Last data filed at 3/25/2024 0400  Gross per 24 hour   Intake --   Output 1295 ml   Net -1295 ml    SpO2: 95 %  -       Peripheral IV 03/24/24 Left Antecubital (Active)   Number of days: 0       ASSESSMENT:  Traumatic ICH  HTN urgency  Alzheimer's dementia   HL    PLAN:  Supplemental O2 to maintain SaO2 > 95%  Aspiration Precautions (HOB elevated, Up for meals, mouth care)  Keep SBP ? 160 mmHg (<180 if presenting BP was >220)  PRN Labetalol 10 mg  NeuroCC on board, pending recommendations   Prophylactic AED   Restart home meds  Keep HOB >30 degrees  Avoid hypotonic solutions , keep sNa >135 mEq/L  Hold full dose anticoagulation and antiplatelet medications x 2 weeks unless otherwise directed.   Transfuse to keep platelets >100K  Maintain  normothermia   Keppra BID   Place NG if unable to pass swallow evaluation   PT/OT/SLP & PMR consult as indicated  ICU Glycemic goal 140-180 mg/dL  Diet: per SLP recommendations  IVF: N/A  GI prophylaxis: elevate HOB 30 degrees,    DVT prophylaxis: SCDs  Bowel regimen: N/A  Abx: N/A    Pt has a high probability of imminent or life-threatening deterioration requiring close monitoring, and highly complex decision-making and/or interventions of high intensity to assess,

## 2024-03-25 NOTE — CARE COORDINATION
Case management is following for discharge planning. The chart was reviewed. Met with Ms. Gilbert and her son, Parth, at the bedside. Ms. Gilbert was sleeping and did not interact. Parth stated he call [facility] today because he is interested in a referral there. SAJI would like for his mother to go to their inpatient unit. Explained the referral process and he expressed understanding. A call was placed to [facility] [phone]. Spoke with intake and faxed a referral to [phone]. [facility] will reach out to Parth to arrange a meeting.    Kaci Gifford RN  [phone]

## 2024-03-25 NOTE — PROGRESS NOTES
Patient admitted from ED with Intraparenchymal hemorrhage. Patient on Cardene gtt running at 5mg/hr in ED. Cardene stopped by ICU RN with /83 and target <160 SBP. All VSC. NIH reported as 7 and ICU admission NIH 2. GCS 14. Access appropriate. Skin assessed per protocol. Patient has dementia at baseline. Patient confused and restless. External catheter in place with UOP of ~200 ml. Lungs clear and GI/ unremarkable. Patient scheduled for Q1 neuro exams. Follow up CT due ~0300. MRI pending family response to phone call for checklist. Room safety measures in place.

## 2024-03-25 NOTE — PROGRESS NOTES
Neurocritical Care Progress Note    Patient: Doreen Perezp MRN: [id number]    YOB: 1935  Age: 89 y.o.  Sex: female   Unit: [facility] ICU TOWER Room/Bed: 4510/4510-01 Location: [facility]    Today's Date: 3/25/2024  Date of Admission: 3/24/2024  9:11 PM  Admitting Physician: MARILYN AMIN    Primary Care Physician: Odalys Dale MD          LOS: 1 day      ASSESSMENT & RECOMMENDATIONS     Assessment  89F with PMH of Alzhemier, HTN, HLD, CK3, who presented with right frontal parietal ICH and SAH with vasogenic oedema. S/P fall  Repeat CT head is stable and CTA was negative as well    Recommendations  SBP  < 160   MRI and MRV   Keppra 500 BID for 7 days   Consider DVT PPX tomorrow if she is clinically unchanged.   Neuro q1, stay in the ICU until MRV has r/o CVT       General Care Issues  Hold all full dose anticoagulation and antiplatelet medications x 2 weeks unless benefits > risks .  Glucose: Goal glucose 110-180 mg/dL.    Sodium:  Maintain in normal range (135 - 146 Nancy/L)  Magnesium: Maintain > 1.8 mg/dL.  Heme: Keep Plts ? 100K, Keep INR ? 1.4  Temperature: Goal is normothermia.  Culture for fever > 101.5 F  Nutrition: Place NG if unable to pass swallow evaluation   PT/OT/SLP & PMR consult as indicated    SUBJECTIVE     Chart reviewed, events noted, pt seen & examined. No acute events overnight. Afebrile.     Review of Systems  No changes since pt last seen other than those noted above.    PFSH  No change since the original history and note.     OBJECTIVE     Patient Vitals for the past 24 hrs:   BP Temp Temp src Pulse Resp SpO2 Height Weight   03/25/24 0800 113/70 97.3 °F (36.3 °C) Oral 64 16 95 % -- --   03/25/24 0745 (!) 138/103 -- -- 80 28 (!) 89 % -- --   03/25/24 0730 133/63 -- -- 65 16 96 % -- --   03/25/24 0715 130/61 -- -- 66 16 97 % -- --   03/25/24 0700 (!) 132/57 -- -- 67 17 99 % -- --   03/25/24 0645 139/66 -- -- 64 16 97 % -- --   03/25/24 0419 --  EDT.      Electronically signed by Silviano Aleman MD      MRI BRAIN W WO CONTRAST    (Results Pending)   MRV HEAD W WO CONTRAST    (Results Pending)          Laboratory Review:   All results below, not included in previous notes, personally reviewed. Pertinent positives & negatives are addressed in Assessment & Plan section of note      Scheduled Meds:   sodium chloride flush  5-40 mL IntraVENous 2 times per day    cefTRIAXone (ROCEPHIN) IV  1,000 mg IntraVENous Q24H    levothyroxine  50 mcg Oral Daily    labetalol  10 mg IntraVENous Once    levETIRAcetam  500 mg IntraVENous Q12H       Continuous Infusions:  sodium chloride  niCARdipine, Last Rate: Stopped (03/25/24 0100)        PRN Meds:  labetalol, 10 mg, Q4H PRN  sodium chloride flush, 5-40 mL, PRN  sodium chloride, , PRN  acetaminophen, 650 mg, Q4H PRN  ondansetron, 4 mg, Q8H PRN   Or  ondansetron, 4 mg, Q6H PRN                Discussed at length with patient, family, nursing staff and the medical ICU team. All agreed with the current plan     Time: 50 mins     Oceansidejovita Murrell DO ([place])  Neurocritical Care   [phone]  March 25, 2024

## 2024-03-25 NOTE — PROGRESS NOTES
Bladder scanned per order with >900ml indicated. Straight catheter placed and returned >850ml. Patient is also voiding via external catheter with output of ~250ml since ED admission.

## 2024-03-25 NOTE — CONSULTS
NEUROSURGERY CONSULT NOTE    ASCENCION BURNS  [id number]   1935   3/25/2024    Requesting physician: Beth Izquierdo MD    Reason for consultation:ICH    History of present illness: Ascencion Burns is a 89 y.o. y/o female with history significant for Alzheimer's dementia, hypertension, hyperlipidemia, CHF, hypothyroidism, CKD stage III, and former tobacco user, who presents to [facility] for fall and left-sided weakness.  History obtained from chart review and ED physician. Patient poor historian and does not recall events. Patient's family not present at bedside at time of history.    Per chart review, patient was at home, and around 5 PM she had a fall from her chair.  Patient's son was reportedly with her at time of fall and did not report patient hitting her head. Later around 8 PM, the patient was ambulating on her own, was seen leaning up against a wall and her son went to assist her. As she was ambulating, the patient fell forward onto the ground.  Patient reportedly did not hit her head with this fall either.  After falling patient had reported having some abdominal pain. Patient's son also reported that the patient was experiencing left-sided weakness, prompting him to call 911.  On arrival to the ER, patient's blood pressure was initially 173/101. Blood glucose was 98.  CT head was obtained, which was significant for high right frontal parietal parenchymal hemorrhage and subarachnoid hemorrhage with mild vasogenic edema, with small right subdural hemorrhage.  CTA was severely motion degraded and nondiagnostic.  Her CTA did ring out over Viz.AI initially for LVO.  Imaging was reviewed by neurovascular surgery who reported they did not see any LVO or aneurysms.  ED lab workup was significant for sodium 135, chloride 100, creatinine 1.2, EGFR 44, LFTs unremarkable, H&H stable, platelets 175, PT/INR 12.8/0.96.  UA consistent with  vessels are patent. ABDOMINAL WALL: Normal. BONES: No significant abnormality. Remote healed fracture of the left pubic symphysis and left superior pubic ramus. IMPRESSION: 1. No acute findings in the abdomen or pelvis. Electronically signed by Erwin Ordoñez MD    CTA HEAD NECK W CONTRAST    Result Date: 3/24/2024  1.  Essentially nondiagnostic severely motion degraded exam. 2.  No evidence of vascular malformation or mass in the region of hemorrhage in the high right frontal/parietal lobe. Electronically signed by Silviano Aleman MD    XR CHEST PORTABLE    Result Date: 3/24/2024  No acute cardiopulmonary findings. Electronically signed by Silviano Aleman MD    CT HEAD WO CONTRAST    Result Date: 3/24/2024  High right frontal parietal parenchymal and subarachnoid hemorrhage with mild vasogenic edema. Small right tentorial subdural hemorrhage. No mass effect or midline shift. Critical findings were communicated to   by Silviano Aleman MD on  3/24/2024 21:20 EDT. Electronically signed by Silviano Aleman MD       Labs:  Recent Labs     03/25/24  0506   WBC 12.2*   HGB 12.3   HCT 37.0   PLT see below       Recent Labs     03/25/24  0506      K 3.8      CO2 24   BUN 21*   CREATININE 0.9   GLUCOSE 122*   CALCIUM 9.7   MG 2.00       Recent Labs     03/24/24  2120   PROTIME 12.8   INR 0.96       Patient Active Problem List    Diagnosis Date Noted    Hypertensive heart and chronic kidney disease with heart failure and stage 1 through stage 4 chronic kidney disease, or unspecified chronic kidney disease (HCC) 11/21/2022    Right shoulder pain 10/11/2022    Alzheimer's dementia (HCC) 05/23/2022    Intraparenchymal hemorrhage of brain (HCC) 03/24/2024    Hyperlipidemia     Hypertension     Hypothyroidism     Osteoarthritis     Stage 3 chronic kidney disease, unspecified whether stage 3a or 3b CKD ([facility])     Cervical disc disease 01/01/2019    Hiatal hernia 12/07/2017    Hypercholesterolemia 12/07/2017    GERD (gastroesophageal reflux

## 2024-03-25 NOTE — H&P
ICU History & Physical    Hospital Day: 1   ICU Day: 1  Admit Date: 3/24/2024    PCP: Odalys Dale MD                    Code: DNR-CCA                CC: Extremity Weakness (Left sided weakness last known well 2000 3/24/24)       History of Present Illness:   Doreen Gilbert is a 89 y.o. female who presented to the ED on 3/24/24 due to left-sided weakness. She has a PMHx notable for Alzheimer's, HTN, CKD stage 3, hypothyroidism. Other medical history listed below.     As patient has advanced Alzheimer's, history taken from son and chart review.  Per son, patient had trouble getting her left foot into the car around 3 pm.  Later that afternoon at home, she was going to sit down on a chair but fell to the ground, son says patient did not hit her head.  Patient was able to get up and walk around after the.  Around 8:15pm, just after patient had finished brushing her teeth and was leaving the bathroom, son saw her holding onto the nixon for support.  She walked into the living room and then suddenly fell face down onto her hands.  Son said patient then crawled to the couch and complained of her left foot hurting.  She was also complaining of abdominal pain.  That has been patient's son called EMS.  Additional information provided by patient's son is that as patient's Alzheimer's has progressed, it has been harder to make her take all of her medications.  She does have some days where she does not take her blood pressure meds.    ED course:  Vitals:   BP  173/103   Temp  98.2F   Pulse  83   Resp  14   SpO2  99   Labs: BMP showed only showed electrolytes within normal limits and glucose 97.  LFTs and CBC also within normal limits.  Cr was 1.2 and eGFR 44.  UA showed moderate leukocyte estrace, negative nitrate, WBC 10-20, 4+ bacteria.     Imaging: CT head w/o contrast, \"High right frontal parietal parenchymal and subarachnoid hemorrhage with mild vasogenic edema. Small right tentorial subdural    CT HEAD WO CONTRAST   Final Result      High right frontal parietal parenchymal and subarachnoid hemorrhage with mild vasogenic edema. Small right tentorial subdural hemorrhage. No mass effect or midline shift.      Critical findings were communicated to   by Silviano Aleman MD on  3/24/2024 21:20 EDT.      Electronically signed by Silviano Aleman MD      MRI BRAIN W WO CONTRAST    (Results Pending)   MRV HEAD W WO CONTRAST    (Results Pending)       Assessment and Plan:   Doreen Gilbert is an 89 y.o. female, who presented with left-sided weakness and was found to have a right frontal parietal parenchymal and subarachnoid hemorrhage with mild vasogenic edema.    Right frontal parietal parenchymal hemorrhage  Subarachnoid hemorrhage  Patient presenting with new onset left-sided weakness.  BP was elevated and parenchymal bleed present.    LKW approximately 8pm on 3/24.  Patient's baseline is not fully oriented as she has Alzheimer's.  Neurocritical care consulted and following.  F/u neurosurgery recs.  Keppra 500mg BID for seizure prophylaxis.   Cardene drip started with goal SBP ? 160 mmHg.  Goal -160.  Every hour neurochecks.  Stability CT head w/o contrast ordered for 3am today.  MRI brain and MRV head w/ and w/o contrast ordered.  DVT prophylaxis with SCDs.  PT/OT/Speech evals ordered.   Aspiration precautions.    UTI  Patient experiencing suprapubic tenderness and urinary frequency.  UA showed moderate leukocyte estrace, negative nitrate, WBC 10-20, 4+ bacteria.   F/u urine culture  Ceftriaxone 1g daily started.     HTN  Patient not always compliant with home meds.  She has Alzheimer's and some days will not cooperate with taking her medication.   Home meds are amlodipine 5 mg and losartan 100 mg.  Patient currently NPO so BP is being managed by Cardene drip.    CKD stage 3  Baseline Cr 1.1 and 1.2 on admission.   Daily BMP   Maintain MAP > 65 mmHg  Avoid nephrotoxic agents     Hypothyroidism  Last TSH 1/10/24

## 2024-03-25 NOTE — CONSULTS
Neurocritical Care Consult Note      Patient: Doreen Gilbert MRN: [id number]    YOB: 1935  Age: 89 y.o.  Sex: female   Unit: [facility] EMERGENCY DEPT  Room/Bed: 2TR/2TRA-A2 Location: [facility]    Date of Consultation: 3/24/2024  Date of Admission: 3/24/2024  9:11 PM ( LOS: 0 days )  Primary Care Physician: Odalys Dale MD   Consult Requested By: Angel Gonzalez MD    Reason for Consult: ICH    IMPRESSION & RECOMMENDATIONS     IMPRESSION:   89 y.o. y/o female with history significant for Alzheimer's dementia, hypertension, hyperlipidemia, CHF, hypothyroidism, CKD stage III, and former tobacco user, who presents to [facility] for falls and left sided weakness, found to have a high right frontal parietal parenchymal and SAH with mild vasogenic edema.     Patient reportedly did not hit her head while falling, however history is insufficient at this time as there are no reports from the patient's son and patient does not recall the events in nature.  She is also noted to be hypertensive and is requiring nicardipine infusion which could preclude her to having spontaneous ICH.  However, given the appearance of the ICH and proximity to the superior sagittal sinus, cannot fully exclude dural venous sinus thromboses.    ICH SCORE:    Component SCORE   GCS 0 - GCS 13-15   ICH Volume 0 - <30ml   Intraventricular Hemorrhage 0 - NO   Infratentorial Origin 0 - NO   Age 1 - > or equal to 79 y/o   TOTALS POINTS: 1       RECOMMENDATIONS:   NEURO:  Neurologic Exams Q1H  NIHSS on admission & Qshift  Cerebral Edema  Avoid hypotonic fluids  HOB >30  Avoid IJ unless cleared by NCC team    DIAGNOSTIC IMAGING  Head CT - f/u scan in 6 hours (ordered for 03:00 AM on 3/25/24)  MRI of brain w/ & w/o when able  MRV of brain when able.    ICU MANAGEMENT  Airway Management  Supplemental O2 to maintain SaO2 > 95%  Aspiration Precautions (HOB elevated, Up for meals, mouth care)  Intubate for respiratory

## 2024-03-25 NOTE — PROGRESS NOTES
with mild vasogenic edema. Small right tentorial subdural hemorrhage. No mass effect or midline shift.      Critical findings were communicated to   by Silviano Aleman MD on  3/24/2024 21:20 EDT.      Electronically signed by Silviano Aleman MD      MRI BRAIN W WO CONTRAST    (Results Pending)   MRV HEAD W WO CONTRAST    (Results Pending)       Date of onset: 3/24/24    Current Diet:  Diet NPO    Treatment Diagnosis: dysphagia    Pain:  denies    General:  Chart reviewed: Yes  Behavior/Cognition: pleasant and cooperative  Communication Observation: WFL  Follows Directions: yes, simple  Dentition/Oral Mucosa: some missing teeth  Respiratory Status/oxygen requirements: room air  Vision/Hearing: wears glasses  Patient Complaint: not able to state  Patient Positioning:  upright in bed  Prior Level of Function lives with family who reports she eats regular diet     Prior Dysphagia History:   none    Bedside Swallowing Evaluation Impression 3/25/24  Pt alert, oriented only to person, followed simple commands.  Oral structures grossly intact, no asymmetry noted.  Some missing teeth noted.  Pt produced volitional cough and swallow.  Pt analyzed with applesauce, leigh crackers and thin liquids via cup / straw. Pt demonstrated no overt signs of aspiration: no coughing/throat clearing or change in vocal quality.  Swallow movement noted upon palpation of anterior neck. Adequate labial seal noted with no anterior loss of liquids.  Pt exhibited no difficulty with mastication of cracker, no oral residue. No c/o globus sensation. Family reports pt eats regular diet at baseline  Recommend regular diet/thin liquids    Instrumental assessment results - not indicated    Speech, Language, Cognition  Pt with severe dementia at baseline. Speech is intelligible, no  dysarthria or aphasia noted. Appears most likely at cognitive baseline -son present and agrees    Prognosis:  Prognosis for improvement: good  Barriers to reach goals: cognitive

## 2024-03-25 NOTE — PROGRESS NOTES
10ft    Ambulation 2  Device 2: No device (Hand held assist)  Assistance 2: Moderate assistance  Quality of Gait 2: ran into door frame on left, posterior lean, increased verbal directional cues  Gait Deviations: Slow Rebeca;Decreased step length;Decreased step height  Distance: 10ft    Balance  Sitting - Static: Fair  Sitting - Dynamic: Fair  Standing - Static: Poor (Min-Mod assist at sink, multiple posterior loss of balances with pt reaching out with R hand to stedy self.)  Standing - Dynamic: Poor (Mod A with and without use of rolling walker for ambulation)      AM-PAC - Mobility  AM-PAC Basic Mobility - Inpatient   How much help is needed turning from your back to your side while in a flat bed without using bedrails?: A Little  How much help is needed moving from lying on your back to sitting on the side of a flat bed without using bedrails?: A Little  How much help is needed moving to and from a bed to a chair?: A Little  How much help is needed standing up from a chair using your arms?: A Little  How much help is needed walking in hospital room?: A Lot  How much help is needed climbing 3-5 steps with a railing?: Total  AM-PAC Inpatient Mobility Raw Score : 15  AM-PAC Inpatient T-Scale Score : 39.45  Mobility Inpatient CMS 0-100% Score: 57.7  Mobility Inpatient CMS G-Code Modifier : CK    Goals  Short Term Goals  Time Frame for Short Term Goals: Discharge  Short Term Goal 1: supine <> sit supervision  Short Term Goal 2: sit <> stand supervision  Short Term Goal 3: ambulate 150ft with/without assistive device SBA  Patient Goals   Patient Goals : Unable to state due to cognition.       Education  Patient Education  Education Given To: Patient  Education Provided: Role of Therapy;Plan of Care  Education Method: Verbal  Education Outcome: Unable to verbalize;Unable to demonstrate understanding;Continued education needed      Therapy Time   Individual Concurrent Group Co-treatment   Time In 1030         Time Out  1128         Minutes 58         Timed Code Treatment Minutes:  45  Total Treatment Minutes:  58      Alena Restrepo, PT

## 2024-03-25 NOTE — PROGRESS NOTES
4 Eyes Skin Assessment     NAME:  Doreen Gilbert  YOB: 1935  MEDICAL RECORD NUMBER:  [id number]    The patient is being assessed for  Admission    I agree that at least one RN has performed a thorough Head to Toe Skin Assessment on the patient. ALL assessment sites listed below have been assessed.      Areas assessed by both nurses:    Head, Face, Ears, Shoulders, Back, Chest, Arms, Elbows, Hands, Sacrum. Buttock, Coccyx, Ischium, Legs. Feet and Heels, Under Medical Devices , and Other yes      SEE EPIC  Does the Patient have a Wound? No noted wound(s)       Melchor Prevention initiated by RN: Yes  Wound Care Orders initiated by RN: No    Pressure Injury (Stage 3,4, Unstageable, DTI, NWPT, and Complex wounds) if present, place Wound referral order by RN under : No    New Ostomies, if present place, Ostomy referral order under : No     Nurse 1 eSignature: Electronically signed by Blayne Benavides RN on 3/25/24 at 1:49 AM EDT    **SHARE this note so that the co-signing nurse can place an eSignature**    Nurse 2 eSignature: Electronically signed by Isabela Pompa RN on 3/25/24 at 6:24 AM EDT

## 2024-03-25 NOTE — ED PROVIDER NOTES
THE [facility]  EMERGENCY DEPARTMENT ENCOUNTER          EM RESIDENT NOTE       Date of evaluation: 3/24/2024    Chief Complaint     Extremity Weakness (Left sided weakness last known well 2000 3/24/24)      History of Present Illness     Doreen Gilbert is a 89 y.o. female who presents via EMS with symptoms concerning for acute stroke.  Per EMS report, the patient was witnessed to \"slumped over\" to her left side by her adult son, this occurred approximately 1 hour prior to arrival which is her LKW.  There was a fall described, unclear if this was describing her slumping over or if there was another fall event. Per EMS report, left-sided weakness, glucose 106, vitals WNL.  Reported history of dementia.  No known blood thinners.  No family available at this time.    At 2:20 AM, son at bedside and obtained collateral information.  He reports that the patient had been complaining of some lower abdominal pain for much of the morning.  Around 8818-1738 today, she had a fall from chair that he witnessed, fell onto bottom, no head strike or LOC. A little before 2000, he saw her leaning against a wall, seem to have some difficulty walking, and required assistance.  When he was helping her walk, she had another fall, fell forward onto her abdomen but no head strike or impact of face.  She continued to complain of pain in her abdomen.  Given this and her weakness, concerned could be atypical heart attack or stroke so 911 was called.    Best contact number for Parth (Son): [phone]    MEDICAL DECISION MAKING / ASSESSMENT / PLAN     INITIAL VITALS: BP: (!) 173/103, Temp: 98.2 °F (36.8 °C), Pulse: 83, Respirations: 14, SpO2: 99 %    Doreen Gilbert is a 89 y.o. female presenting with acute onset of left-sided weakness and sensory loss, reported LKW 2000. Arrives HDS, afebrile, and in NAD.  Initial exam with left upper extremity and left lower extremity weakness and sensory loss, no other obvious deficit. NIHSS 10 (for  Total:   10          Ailyn Prasad MD  Resident  03/26/24 1127

## 2024-03-25 NOTE — ED PROVIDER NOTES
ED Attending Attestation Note     Date of evaluation: 3/24/2024    This patient was seen by the resident.  I have seen and examined the patient, agree with the workup, evaluation, management and diagnosis. The care plan has been discussed.  I have reviewed the ECG and concur with the resident's interpretation.  My assessment reveals 89-year-old female presenting to the hospital for evaluation of left upper extremity left lower weakness and was a witnessed neurological change about an hour ago.  Patient noted to have normal glucose with weakness noted to those extremities.  She has a history of dementia.  Patient found to have an ICH on her CT scan.  She was noted to be hypertensive and had medications ordered for her at this time.    Critical Care:  Due to the immediate potential for life-threatening deterioration due to neurological insult, I spent 40 minutes providing critical care.  This time excludes time spent performing procedures but includes time spent on direct patient care, history retrieval, review of the chart, and discussions with patient, family, and consultant(s).       Angel Gonzalez MD  03/25/24 0007

## 2024-03-25 NOTE — PROGRESS NOTES
Current NIHSS 2    Nursing Core Measures for Stroke:   [x]   Education template documentation (STROKE/TIA). Please select only risk factors that are applicable to patient when selecting risk factors.  BASELINE DEMENTIA - NEEDS FAMILY EDU  [x]   Care Plan template documentation (Physiologic Instability - Neurosensory). Selecting this will add care plan rows to the flowsheet under the Neuro section of Head to Toe.  [x]   Verified Swallow Screen completed prior to PO intake of food, drink, medications  [x]   VTE Prophylaxis: SCDs ordered/addressed; SCDs: On           (As a reminder, ASA, Plavix, and TPA/TNK are not VTE prophylaxis.)    Reviewed the Following Education with Patient and/or Family:   - Personalized risk factors for patient, along with changes, modifications that will help prevent stroke.  - Signs and Symptoms of Stroke: (Facial droop, weakness/numbness especially on one side, speech difficulty, sudden confusion, sudden loss of vision, sudden severe headache, sudden loss of balance or having difficulty walking, syncope, or seizure)  - How to activate EMS (911)   - Importance of Follow Up Appointments at Discharge   - Importance of Compliance with Medications Prescribed at Discharge  - Available community resources and stroke advocacy groups if needed    Patient and/or family member: not available / not appropriate for education at this time.     Stroke Education booklet given to patient/family (or verified, if given already), which reviews above information. no     Pending family    Electronically signed by Blayne Benavides RN on 3/25/2024 at 2:44 AM

## 2024-03-25 NOTE — PROGRESS NOTES
Occupational Therapy  Facility/Department: [facility] ICU  Occupational Therapy Initial Assessment and Treatment Note     Name: Doreen Gilbert  : 1935  MRN: [id number]  Date of Service: 3/25/2024    Discharge Recommendations:  Subacute/Skilled Nursing Facility  OT Equipment Recommendations  Equipment Needed: No  Other: defer to next care facility       Patient Diagnosis(es): The primary encounter diagnosis was Intracranial hemorrhage (HCC). Diagnoses of Acute left-sided weakness and Left-sided sensory deficit present were also pertinent to this visit.  Past Medical History:  has a past medical history of Alzheimer's dementia (HCC), Cervical disc disease, Chronic kidney disease, Clavicle fracture, Hyperlipidemia, Hypertension, Hypothyroidism, Pelvis fracture (HCC), Retinal disease, Right shoulder pain, and Tobacco abuse, in remission.  Past Surgical History:  has a past surgical history that includes Hysterectomy, vaginal; Tonsillectomy (N/A); Appendectomy; and Cataract removal.    Treatment Diagnosis: impaired ADLs/ functional transfers / decreased problem solving / insight 2/2 ICH / ongoing dementia      Assessment   Performance deficits / Impairments: Decreased functional mobility ;Decreased ADL status;Decreased safe awareness;Decreased cognition;Decreased high-level IADLs  Assessment: Pt from home with family- Pt with moderated dementia at baseline - unable to obtain prior Level of function.  Pt today appears to be functioning below home based baseline.  Pt needing Mod A HHA with functional mobility / transfers and Mod / Max A with LE ADLs/ toileting 2/2 sequencing and inability to problem solve.  Pt demo dysfunction of  LUE with using walker / attending to tasks / items.  LUE AROM / strength intact.  ? Visual field impairment vs global peripheral deficits.  Recommend ongoing inpt OT at d/c to maximize functional level. If home will need close / constant supervision/ assist for all mobility / selfcare.- HHOT.    No significant visual history  Visual Field Cut: Left  Oculo Motor Control: Impaired (scanning to L side more problematic  vs R side.  ? decreased peripheral vision in  Bilateral eyes 2/2 progression of  dementia)  Vision  Vision: Within Functional Limits  Hearing  Hearing: Within functional limits  Cognition  Overall Cognitive Status: Exceptions  Arousal/Alertness: Delayed responses to stimuli  Following Commands: Inconsistently follows commands  Attention Span: Difficulty attending to directions  Memory: Decreased short term memory;Decreased recall of recent events  Safety Judgement: Decreased awareness of need for assistance;Decreased awareness of need for safety  Insights: Not aware of deficits  Initiation: Requires cues for all  Sequencing: Requires cues for all  Cognition Comment: Pt demo poor attention to task.  Decreased insight into deficits.  Poor problem solving.  Baseline dementia per chart- worsening.  Orientation  Overall Orientation Status: Impaired  Orientation Level: Oriented to person;Oriented to place;Disoriented to time;Disoriented to situation     Education Given To: Patient  Education Provided: Role of Therapy;Plan of Care;IADL Safety;ADL Adaptive Strategies  Education Method: Demonstration  Barriers to Learning: Cognition  Education Outcome: Unable to verbalize;Unable to demonstrate understanding       Hand Dominance  Hand Dominance: Right     AM-PAC - ADL  AM-PAC Daily Activity - Inpatient   How much help is needed for putting on and taking off regular lower body clothing?: A Lot  How much help is needed for bathing (which includes washing, rinsing, drying)?: A Lot  How much help is needed for toileting (which includes using toilet, bedpan, or urinal)?: A Lot  How much help is needed for putting on and taking off regular upper body clothing?: A Lot  How much help is needed for taking care of personal grooming?: A Little  How much help for eating meals?: A Little  AM-PAC Inpatient Daily

## 2024-03-26 ENCOUNTER — APPOINTMENT (OUTPATIENT)
Dept: CT IMAGING | Age: 89
DRG: 083 | End: 2024-03-26
Payer: MEDICARE

## 2024-03-26 LAB
ANION GAP SERPL CALCULATED.3IONS-SCNC: 12 MMOL/L (ref 3–16)
BACTERIA UR CULT: ABNORMAL
BASOPHILS # BLD: 0.1 K/UL (ref 0–0.2)
BASOPHILS NFR BLD: 1.3 %
BUN SERPL-MCNC: 14 MG/DL (ref 7–20)
CALCIUM SERPL-MCNC: 9.8 MG/DL (ref 8.3–10.6)
CHLORIDE SERPL-SCNC: 102 MMOL/L (ref 99–110)
CO2 SERPL-SCNC: 25 MMOL/L (ref 21–32)
CREAT SERPL-MCNC: 0.9 MG/DL (ref 0.6–1.2)
DEPRECATED RDW RBC AUTO: 15 % (ref 12.4–15.4)
EOSINOPHIL # BLD: 0.2 K/UL (ref 0–0.6)
EOSINOPHIL NFR BLD: 2.4 %
EST. AVERAGE GLUCOSE BLD GHB EST-MCNC: 108.3 MG/DL
GFR SERPLBLD CREATININE-BSD FMLA CKD-EPI: 62 ML/MIN/{1.73_M2}
GLUCOSE SERPL-MCNC: 98 MG/DL (ref 70–99)
HBA1C MFR BLD: 5.4 %
HCT VFR BLD AUTO: 39.8 % (ref 36–48)
HGB BLD-MCNC: 13.2 G/DL (ref 12–16)
LYMPHOCYTES # BLD: 2.2 K/UL (ref 1–5.1)
LYMPHOCYTES NFR BLD: 28.3 %
MAGNESIUM SERPL-MCNC: 2.2 MG/DL (ref 1.8–2.4)
MCH RBC QN AUTO: 28.5 PG (ref 26–34)
MCHC RBC AUTO-ENTMCNC: 33.2 G/DL (ref 31–36)
MCV RBC AUTO: 85.9 FL (ref 80–100)
MONOCYTES # BLD: 0.8 K/UL (ref 0–1.3)
MONOCYTES NFR BLD: 10.7 %
NEUTROPHILS # BLD: 4.4 K/UL (ref 1.7–7.7)
NEUTROPHILS NFR BLD: 57.3 %
ORGANISM: ABNORMAL
PLATELET # BLD AUTO: 167 K/UL (ref 135–450)
PMV BLD AUTO: 10.2 FL (ref 5–10.5)
POTASSIUM SERPL-SCNC: 3.7 MMOL/L (ref 3.5–5.1)
RBC # BLD AUTO: 4.63 M/UL (ref 4–5.2)
SODIUM SERPL-SCNC: 139 MMOL/L (ref 136–145)
WBC # BLD AUTO: 7.6 K/UL (ref 4–11)

## 2024-03-26 PROCEDURE — 1200000000 HC SEMI PRIVATE

## 2024-03-26 PROCEDURE — 97535 SELF CARE MNGMENT TRAINING: CPT

## 2024-03-26 PROCEDURE — 6370000000 HC RX 637 (ALT 250 FOR IP): Performed by: STUDENT IN AN ORGANIZED HEALTH CARE EDUCATION/TRAINING PROGRAM

## 2024-03-26 PROCEDURE — 99232 SBSQ HOSP IP/OBS MODERATE 35: CPT | Performed by: STUDENT IN AN ORGANIZED HEALTH CARE EDUCATION/TRAINING PROGRAM

## 2024-03-26 PROCEDURE — 6370000000 HC RX 637 (ALT 250 FOR IP)

## 2024-03-26 PROCEDURE — 6360000002 HC RX W HCPCS: Performed by: STUDENT IN AN ORGANIZED HEALTH CARE EDUCATION/TRAINING PROGRAM

## 2024-03-26 PROCEDURE — 99232 SBSQ HOSP IP/OBS MODERATE 35: CPT | Performed by: NURSE PRACTITIONER

## 2024-03-26 PROCEDURE — 2580000003 HC RX 258

## 2024-03-26 PROCEDURE — 6360000004 HC RX CONTRAST MEDICATION: Performed by: STUDENT IN AN ORGANIZED HEALTH CARE EDUCATION/TRAINING PROGRAM

## 2024-03-26 PROCEDURE — 85025 COMPLETE CBC W/AUTO DIFF WBC: CPT

## 2024-03-26 PROCEDURE — 80048 BASIC METABOLIC PNL TOTAL CA: CPT

## 2024-03-26 PROCEDURE — 92526 ORAL FUNCTION THERAPY: CPT

## 2024-03-26 PROCEDURE — 70496 CT ANGIOGRAPHY HEAD: CPT

## 2024-03-26 PROCEDURE — 36415 COLL VENOUS BLD VENIPUNCTURE: CPT

## 2024-03-26 PROCEDURE — 99232 SBSQ HOSP IP/OBS MODERATE 35: CPT | Performed by: INTERNAL MEDICINE

## 2024-03-26 PROCEDURE — 83735 ASSAY OF MAGNESIUM: CPT

## 2024-03-26 PROCEDURE — 97530 THERAPEUTIC ACTIVITIES: CPT

## 2024-03-26 RX ORDER — LEVETIRACETAM 500 MG/1
500 TABLET ORAL 2 TIMES DAILY
Status: DISCONTINUED | OUTPATIENT
Start: 2024-03-26 | End: 2024-03-27 | Stop reason: HOSPADM

## 2024-03-26 RX ORDER — HEPARIN SODIUM 5000 [USP'U]/ML
5000 INJECTION, SOLUTION INTRAVENOUS; SUBCUTANEOUS EVERY 8 HOURS SCHEDULED
Status: DISCONTINUED | OUTPATIENT
Start: 2024-03-26 | End: 2024-03-27 | Stop reason: HOSPADM

## 2024-03-26 RX ORDER — OLANZAPINE 5 MG/1
5 TABLET ORAL ONCE
Status: COMPLETED | OUTPATIENT
Start: 2024-03-26 | End: 2024-03-26

## 2024-03-26 RX ORDER — CEPHALEXIN 500 MG/1
500 CAPSULE ORAL EVERY 12 HOURS
Status: DISCONTINUED | OUTPATIENT
Start: 2024-03-26 | End: 2024-03-27 | Stop reason: HOSPADM

## 2024-03-26 RX ADMIN — CEPHALEXIN 500 MG: 500 CAPSULE ORAL at 14:30

## 2024-03-26 RX ADMIN — HEPARIN SODIUM 5000 UNITS: 5000 INJECTION INTRAVENOUS; SUBCUTANEOUS at 23:16

## 2024-03-26 RX ADMIN — IOPAMIDOL 75 ML: 755 INJECTION, SOLUTION INTRAVENOUS at 10:48

## 2024-03-26 RX ADMIN — HEPARIN SODIUM 5000 UNITS: 5000 INJECTION INTRAVENOUS; SUBCUTANEOUS at 14:30

## 2024-03-26 RX ADMIN — SODIUM CHLORIDE, PRESERVATIVE FREE 10 ML: 5 INJECTION INTRAVENOUS at 09:44

## 2024-03-26 RX ADMIN — ATORVASTATIN CALCIUM 10 MG: 10 TABLET, FILM COATED ORAL at 09:15

## 2024-03-26 RX ADMIN — SODIUM CHLORIDE, PRESERVATIVE FREE 10 ML: 5 INJECTION INTRAVENOUS at 19:52

## 2024-03-26 RX ADMIN — CEPHALEXIN 500 MG: 500 CAPSULE ORAL at 02:30

## 2024-03-26 RX ADMIN — LOSARTAN POTASSIUM 100 MG: 100 TABLET, FILM COATED ORAL at 09:15

## 2024-03-26 RX ADMIN — OLANZAPINE 5 MG: 5 TABLET, FILM COATED ORAL at 09:42

## 2024-03-26 RX ADMIN — LEVETIRACETAM 500 MG: 500 TABLET, FILM COATED ORAL at 09:15

## 2024-03-26 RX ADMIN — AMLODIPINE BESYLATE 5 MG: 5 TABLET ORAL at 09:15

## 2024-03-26 RX ADMIN — LEVOTHYROXINE SODIUM 50 MCG: 50 TABLET ORAL at 09:14

## 2024-03-26 RX ADMIN — LEVETIRACETAM 500 MG: 500 TABLET, FILM COATED ORAL at 00:40

## 2024-03-26 ASSESSMENT — PAIN SCALES - PAIN ASSESSMENT IN ADVANCED DEMENTIA (PAINAD)
FACIALEXPRESSION: SMILING OR INEXPRESSIVE
BODYLANGUAGE: RELAXED
CONSOLABILITY: NO NEED TO CONSOLE
TOTALSCORE: 0
TOTALSCORE: 0
FACIALEXPRESSION: SMILING OR INEXPRESSIVE
BREATHING: NORMAL
BODYLANGUAGE: RELAXED
FACIALEXPRESSION: SMILING OR INEXPRESSIVE
BREATHING: NORMAL
FACIALEXPRESSION: SMILING OR INEXPRESSIVE
BREATHING: NORMAL
BODYLANGUAGE: RELAXED
CONSOLABILITY: NO NEED TO CONSOLE
BODYLANGUAGE: RELAXED
FACIALEXPRESSION: SMILING OR INEXPRESSIVE
BREATHING: NORMAL
TOTALSCORE: 0
BREATHING: NORMAL
BODYLANGUAGE: RELAXED
FACIALEXPRESSION: SMILING OR INEXPRESSIVE
TOTALSCORE: 0
CONSOLABILITY: NO NEED TO CONSOLE
BODYLANGUAGE: RELAXED
BREATHING: NORMAL
TOTALSCORE: 0
TOTALSCORE: 0
CONSOLABILITY: NO NEED TO CONSOLE

## 2024-03-26 ASSESSMENT — PAIN SCALES - GENERAL: PAINLEVEL_OUTOF10: 0

## 2024-03-26 NOTE — PROGRESS NOTES
Patient transferred from [facility] to [facility] for [employer] safety video monitoring.   Patient is pleasantly confused. A&O x1-2; self, place. Re-orient frequently. Afebrile. No complaints of pain.   On room air SpO2 >95%.   Loss of IV access. Multiple attempts, failed.   Notified MD.   IV meds switched to PO. Tolerated well.   Patient ambulated to bathroom with walker.     Call light within reach.  Standard Safety Precautions in place.

## 2024-03-26 NOTE — PROGRESS NOTES
Occupational Therapy    Pt going off unit for CT scan at time of attempt. Will follow up as schedule permits and medically appropriate.    Negrita Cotto, OT

## 2024-03-26 NOTE — PROGRESS NOTES
present and agrees        Treatment:  Dysphagia Goals:  The pt will be seen  1-2 x to address the following goals:   Goal 1: Patient will tolerate least restrictive diet with adequate oral prep and without overt signs of aspiration or associated decline in respiratory status  3/26- goal met-RN reported no concerns re:swallowing or lung status. Pt analyzed with breakfast consisting of scrambled eggs, regalado, water and lemonade by straw. Pt with no difficulty with mastication with solids. Pt with no anterior spillage and with minimal oral residue.  Pt with no s/s aspiration with any consistency. Pt fed self at a slow rate, taking small bites and sips.     Goal 2: Patient/caregiver will demonstrate understanding of dysphagia recommendations/concerns.  3/25: Educated pt son to purpose of visit, s/s of aspiration, concern if aspiration occurs and rationale for diet recommendation/strategies to reduce risk for aspiration. Son reports pt is missing some teeth, however is able to consume a regular diet.  Pt son instructed to notify staff if any signs of aspiration emerge or dysphagia concerns. Son stated comprehension, pt does not indicate comprehension  Cont goal  3/26-goal met- no family present this date. Pt educated to the purpose of the visit, swallowing strategies and diet recommendations. Pt stated comprehension but would benefit from reinforcement given history of dementia.     Education  Please see above.    Total treatment time: 15 dysphagia treatment     Plan: Pt discharged from Speech Therapy services. Pt met all goals for therapy.   Recommended Diet: regular diet/thin liquids  Medication administration: crushed in puree if able    Strategies:   Upright all meals  Check for pocketing of food    Discharge Recommendation:  No further follow up needed unless s/s aspiration emerge, make pt NPO and re-refer.   Discussed with TAMIE Guy  Needs met prior to leaving room, call light within reach      Divya Poe MA,  CCC-SLP [id number]  Speech-Language Pathologist  Pager  [phone]    This document will serve as a dc summary if pt dc prior to next visit

## 2024-03-26 NOTE — PROGRESS NOTES
ICU Progress Note    Admit Date: 3/24/2024  Day: 1  IV Access:Peripheral  IV Fluids:None  Vasopressors:None                Antibiotics: Ceftriaxone  Diet: ADULT DIET; Regular    CC: left-sided weakness    Interval history:   Patient lost IV access overnight. IV meds changed to PO; including antibiotic and keppra. This morning patient resting comfortably with no complaints.    HPI: \"Doreen Gilbert is a 89 y.o. female who presented to the ED on 3/24/24 due to left-sided weakness. She has a PMHx notable for Alzheimer's, HTN, CKD stage 3, hypothyroidism. Other medical history listed below.      As patient has advanced Alzheimer's, history taken from son and chart review.  Per son, patient had trouble getting her left foot into the car around 3 pm.  Later that afternoon at home, she was going to sit down on a chair but fell to the ground, son says patient did not hit her head.  Patient was able to get up and walk around after the.  Around 8:15pm, just after patient had finished brushing her teeth and was leaving the bathroom, son saw her holding onto the nixon for support.  She walked into the living room and then suddenly fell face down onto her hands.  Son said patient then crawled to the couch and complained of her left foot hurting.  She was also complaining of abdominal pain.  That has been patient's son called EMS.  Additional information provided by patient's son is that as patient's Alzheimer's has progressed, it has been harder to make her take all of her medications.  She does have some days where she does not take her blood pressure meds.     ED course:  Vitals:   BP  173/103   Temp  98.2F   Pulse  83   Resp  14   SpO2  99   Labs: BMP showed only showed electrolytes within normal limits and glucose 97.  LFTs and CBC also within normal limits.  Cr was 1.2 and eGFR 44.  UA showed moderate leukocyte estrace, negative nitrate, WBC 10-20, 4+ bacteria.     Imaging: CT head w/o contrast, \"High right frontal  re-evaluation     The note was completed using EMR and Dragon dictation system. Every effort was made to ensure accuracy; however, inadvertent computerized transcription errors may be present.     Nathaniel Goldberg \"Carroll\" Rosalina Larson MD  Pulmonary and Critical Care Medicine

## 2024-03-26 NOTE — CARE COORDINATION
Case management is following for discharge planning. The chart was reviewed. [facility] RN, Emily, met with Ms. Gilbert this morning. After her assessment and discussion with the [facility] Medical director, it is believed Ms. Gilbert does not meet criteria for hospice services at this time. Palliative care as an OP was recommended.    Therapy worked with Ms. Gilbert yesterday and recommended a skilled nursing facility. A call was placed to the pt's son, Parth [phone]. He stated he would like a referral to [facility] for rehab. A call was placed to Yessenia in admissions. Unfortunately, Shira is out of network with [facility]. Updated Parth. A list of Medicare-approved facilities for skilled nursing was left at the bedside. He stated he would be back at the hospital this afternoon and will contact  with preferences.    Kaci Gifford RN  [phone]

## 2024-03-26 NOTE — PROGRESS NOTES
[facility]:  Reviewed chart and spoke to pt's RN for update.  Per notes, pt is ambulatory with a walker up to the bathroom and RN states pt will only void in the bathroom.  She is taking a regular diet with thin liquids.    Pt is confused as per her baseline Dementia but is able to speak in sentences.      Updated the [facility] Medical Director on pt. Status and he is recommending Pallia Care as there are no terminal effects from the ICH.    Spoke to pt's son Parth on the phone to provide update on the above and he verbalized understanding, states she is functionally the same as prior to this event. He states he wanted help at home with her bathing as she does not shower at home.   Son also has expressed interest in rehab.   Reviewed that [facility] will continue to follow along during the hospitalization and will monitor for any changes but at this time will notify the CM that pt does not qualify for hospice.     VM left for CM Kaci.     Emily Retana RN [facility] [phone]

## 2024-03-26 NOTE — PROGRESS NOTES
Neurocritical Care Progress Note    Patient: Doreen Gilbert MRN: [id number]    YOB: 1935  Age: 89 y.o.  Sex: female   Unit: [facility] ICU TOWER Room/Bed: 4504/4504-01 Location: [facility]    Today's Date: 3/26/2024  Date of Admission: 3/24/2024  9:11 PM  Admitting Physician: MARILYN AMIN    Primary Care Physician: Odalys Dale MD          LOS: 2 days      ASSESSMENT & RECOMMENDATIONS     Assessment  89F with PMH of Alzhemier, HTN, HLD, CK3, who presented with right frontal parietal ICH and SAH with vasogenic oedema. S/P fall  Repeat CT head is stable and CTA was negative as well. CTV neg for CVT     Recommendations  SBP  < 160   MRI  Keppra 500 BID for 7 days   Hep SQ for DVT PPX  Neuro q4  Can transfer out of ICU       General Care Issues  Hold all full dose anticoagulation and antiplatelet medications x 2 weeks unless benefits > risks .  Glucose: Goal glucose 110-180 mg/dL.    Sodium:  Maintain in normal range (135 - 146 Nancy/L)  Magnesium: Maintain > 1.8 mg/dL.  Heme: Keep Plts ? 100K, Keep INR ? 1.4  Temperature: Goal is normothermia.  Culture for fever > 101.5 F  Nutrition: Place NG if unable to pass swallow evaluation   PT/OT/SLP & PMR consult as indicated    Addendum:    GOC with family and clarify the hospice situation. Her son is interested in home health rather than inpatient hospice. He is still interested in continuing the workup.     SUBJECTIVE     Chart reviewed, events noted, pt seen & examined. No acute events overnight. Afebrile.     Review of Systems  No changes since pt last seen other than those noted above.    PFSH  No change since the original history and note.     OBJECTIVE     Patient Vitals for the past 24 hrs:   BP Temp Temp src Pulse Resp SpO2   03/26/24 0600 105/62 -- -- 60 22 --   03/26/24 0500 (!) 148/73 -- -- 77 17 --   03/26/24 0400 (!) 99/56 98.5 °F (36.9 °C) Oral 58 16 98 %   03/26/24 0300 (!) 108/51 -- -- 52 13 --   03/26/24 0200

## 2024-03-26 NOTE — PROGRESS NOTES
NEUROSURGERY PROGRESS NOTE    Patient Name: Doreen Gilbert YOB: 1935   Sex: Female Age: 89 yrs     Medical Record Number: [id number] Acct Number: [id number]   Room Number: 4504/4504-01 Hospital Day: Hospital Day: 3     Interval History:pt had ICU awaiting MRI    Subjective: Patient is pleasantly confused. She knows name. The family has decided on hospice..    Objective:    VITAL SIGNS   /62   Pulse 60   Temp 98.5 °F (36.9 °C) (Oral)   Resp 22   Ht 1.676 m (5' 6\")   Wt 64 kg (141 lb)   LMP  (LMP Unknown)   SpO2 98%   BMI 22.76 kg/m²    Height Height: 167.6 cm (5' 6\")   Weight Weight - Scale: 64 kg (141 lb)        Allergies Allergies   Allergen Reactions    Nizatidine     Penicillins Hives      NPO Status ADULT DIET; Regular   Isolation No active isolations     LABS   Basic Metabolic Profile Recent Labs     03/24/24 2120 03/25/24 0506 03/26/24  0417   * 138 139    100 102   CO2 23 24 25   BUN 27* 21* 14   CREATININE 1.2 0.9 0.9   GLUCOSE 97 122* 98   MG  --  2.00 2.20      Complete Blood Count Recent Labs     03/24/24 2120 03/25/24  0506 03/26/24  0417   WBC 8.1 12.2* 7.6   RBC 4.25 4.27 4.63      Coagulation Studies Recent Labs     03/24/24 2120   INR 0.96        MEDICATIONS   Inpatient Medications     cephALEXin, 500 mg, Oral, Q12H    levETIRAcetam, 500 mg, Oral, BID    sodium chloride flush, 5-40 mL, IntraVENous, 2 times per day    levothyroxine, 50 mcg, Oral, Daily    amLODIPine, 5 mg, Oral, Daily    atorvastatin, 10 mg, Oral, Daily    losartan, 100 mg, Oral, Daily   Infusions    sodium chloride      niCARdipine Stopped (03/25/24 0100)      Antibiotics   Recent Abx Admin                     cephALEXin (KEFLEX) capsule 500 mg (mg) 500 mg Given 03/26/24 0230                     Neurologic Exam:  Mental status:

## 2024-03-26 NOTE — PROGRESS NOTES
Occupational Therapy  Facility/Department: [facility] ICU  Daily Treatment Note  NAME: Doreen Gilbert  : 1935  MRN: [id number]    Date of Service: 3/26/2024    Discharge Recommendations:  Subacute/Skilled Nursing Facility  OT Equipment Recommendations  Equipment Needed: No  Other: defer to next care facility      Patient Diagnosis(es): The primary encounter diagnosis was Intracranial hemorrhage (HCC). Diagnoses of Acute left-sided weakness and Left-sided sensory deficit present were also pertinent to this visit.     Assessment    Assessment: Pt poor historian however pleasant and cooperative this date. Overall pt tolerates session well, limited by decreased cognition. Pt requires extensive time for all task completion with max cues/re-direction and high gaurd for safety. Pt completes home distance gait with min assist for safety, balance and RW management. Pt demonstrates significant difficulty with RW coordination however when gait is briefly attempted without RW pt is increasingly unsteady. Pt continues to present below baseline and benefits from continued skilled occupational therapy for progression towards PLOF.  Activity Tolerance: Patient tolerated evaluation without incident;Treatment limited secondary to decreased cognition  Discharge Recommendations: Subacute/Skilled Nursing Facility  Equipment Needed: No  Other: defer to next care facility      Plan   Occupational Therapy Plan  Times Per Week: 5-7x  Times Per Day: Once a day  Current Treatment Recommendations: Functional mobility training;Safety education & training;Self-Care / ADL;Endurance training;Patient/Caregiver education & training;Cognitive reorientation;Neuromuscular re-education     Restrictions   Position Activity Restriction  Other position/activity restrictions: Up with assistance    Subjective   Subjective  Subjective: Pt presents in supported upright supine eating breakfast. Pt pleasantly confused and cooperative/motivated for    How much help is needed for putting on and taking off regular lower body clothing?: A Lot  How much help is needed for bathing (which includes washing, rinsing, drying)?: A Lot  How much help is needed for toileting (which includes using toilet, bedpan, or urinal)?: A Lot  How much help is needed for putting on and taking off regular upper body clothing?: A Lot  How much help is needed for taking care of personal grooming?: A Little  How much help for eating meals?: A Little  AM-[facility] Inpatient Daily Activity Raw Score: 14  AM-PAC Inpatient ADL T-Scale Score : 33.39  ADL Inpatient CMS 0-100% Score: 59.67  ADL Inpatient CMS G-Code Modifier : CK    Therapy Time   Individual Concurrent Group Co-treatment   Time In 1120         Time Out 1228         Minutes 68          Timed Code Treatment Minutes: 68 minutes      Total Treatment Minutes: 68 minutes          Negrita Cotto, OT

## 2024-03-27 VITALS
HEIGHT: 66 IN | HEART RATE: 65 BPM | RESPIRATION RATE: 18 BRPM | DIASTOLIC BLOOD PRESSURE: 69 MMHG | SYSTOLIC BLOOD PRESSURE: 134 MMHG | TEMPERATURE: 97.4 F | OXYGEN SATURATION: 98 % | BODY MASS INDEX: 22.66 KG/M2 | WEIGHT: 141 LBS

## 2024-03-27 LAB
ANION GAP SERPL CALCULATED.3IONS-SCNC: 14 MMOL/L (ref 3–16)
BASOPHILS # BLD: 0.2 K/UL (ref 0–0.2)
BASOPHILS NFR BLD: 2.1 %
BUN SERPL-MCNC: 18 MG/DL (ref 7–20)
CALCIUM SERPL-MCNC: 9.3 MG/DL (ref 8.3–10.6)
CHLORIDE SERPL-SCNC: 101 MMOL/L (ref 99–110)
CHOLEST SERPL-MCNC: 244 MG/DL (ref 0–199)
CO2 SERPL-SCNC: 22 MMOL/L (ref 21–32)
CREAT SERPL-MCNC: 1 MG/DL (ref 0.6–1.2)
DEPRECATED RDW RBC AUTO: 15.5 % (ref 12.4–15.4)
EOSINOPHIL # BLD: 0.3 K/UL (ref 0–0.6)
EOSINOPHIL NFR BLD: 3.6 %
GFR SERPLBLD CREATININE-BSD FMLA CKD-EPI: 55 ML/MIN/{1.73_M2}
GLUCOSE SERPL-MCNC: 96 MG/DL (ref 70–99)
HCT VFR BLD AUTO: 39.4 % (ref 36–48)
HDLC SERPL-MCNC: 81 MG/DL (ref 40–60)
HGB BLD-MCNC: 12.9 G/DL (ref 12–16)
LDLC SERPL CALC-MCNC: 149 MG/DL
LYMPHOCYTES # BLD: 2.8 K/UL (ref 1–5.1)
LYMPHOCYTES NFR BLD: 39.3 %
MAGNESIUM SERPL-MCNC: 2.2 MG/DL (ref 1.8–2.4)
MCH RBC QN AUTO: 28.4 PG (ref 26–34)
MCHC RBC AUTO-ENTMCNC: 32.7 G/DL (ref 31–36)
MCV RBC AUTO: 86.8 FL (ref 80–100)
MONOCYTES # BLD: 0.7 K/UL (ref 0–1.3)
MONOCYTES NFR BLD: 9.4 %
NEUTROPHILS # BLD: 3.3 K/UL (ref 1.7–7.7)
NEUTROPHILS NFR BLD: 45.6 %
PLATELET BLD QL SMEAR: ABNORMAL
POTASSIUM SERPL-SCNC: 3.8 MMOL/L (ref 3.5–5.1)
RBC # BLD AUTO: 4.54 M/UL (ref 4–5.2)
SLIDE REVIEW: ABNORMAL
SODIUM SERPL-SCNC: 137 MMOL/L (ref 136–145)
TRIGL SERPL-MCNC: 70 MG/DL (ref 0–150)
VLDLC SERPL CALC-MCNC: 14 MG/DL
WBC # BLD AUTO: 7.2 K/UL (ref 4–11)

## 2024-03-27 PROCEDURE — 97110 THERAPEUTIC EXERCISES: CPT

## 2024-03-27 PROCEDURE — 83735 ASSAY OF MAGNESIUM: CPT

## 2024-03-27 PROCEDURE — 6360000002 HC RX W HCPCS: Performed by: STUDENT IN AN ORGANIZED HEALTH CARE EDUCATION/TRAINING PROGRAM

## 2024-03-27 PROCEDURE — 97116 GAIT TRAINING THERAPY: CPT

## 2024-03-27 PROCEDURE — 6370000000 HC RX 637 (ALT 250 FOR IP)

## 2024-03-27 PROCEDURE — 36415 COLL VENOUS BLD VENIPUNCTURE: CPT

## 2024-03-27 PROCEDURE — 85025 COMPLETE CBC W/AUTO DIFF WBC: CPT

## 2024-03-27 PROCEDURE — 97530 THERAPEUTIC ACTIVITIES: CPT

## 2024-03-27 PROCEDURE — 80048 BASIC METABOLIC PNL TOTAL CA: CPT

## 2024-03-27 PROCEDURE — 2580000003 HC RX 258

## 2024-03-27 RX ORDER — LEVETIRACETAM 500 MG/1
500 TABLET ORAL 2 TIMES DAILY
Qty: 14 TABLET | Refills: 0 | DISCHARGE
Start: 2024-03-27 | End: 2024-04-03

## 2024-03-27 RX ORDER — CEPHALEXIN 500 MG/1
500 CAPSULE ORAL EVERY 12 HOURS
Qty: 7 CAPSULE | Refills: 0 | DISCHARGE
Start: 2024-03-27 | End: 2024-03-31

## 2024-03-27 RX ADMIN — SODIUM CHLORIDE, PRESERVATIVE FREE 10 ML: 5 INJECTION INTRAVENOUS at 08:07

## 2024-03-27 RX ADMIN — AMLODIPINE BESYLATE 5 MG: 5 TABLET ORAL at 08:07

## 2024-03-27 RX ADMIN — HEPARIN SODIUM 5000 UNITS: 5000 INJECTION INTRAVENOUS; SUBCUTANEOUS at 06:09

## 2024-03-27 RX ADMIN — LOSARTAN POTASSIUM 100 MG: 100 TABLET, FILM COATED ORAL at 08:07

## 2024-03-27 RX ADMIN — ATORVASTATIN CALCIUM 10 MG: 10 TABLET, FILM COATED ORAL at 08:07

## 2024-03-27 RX ADMIN — CEPHALEXIN 500 MG: 500 CAPSULE ORAL at 03:13

## 2024-03-27 RX ADMIN — LEVOTHYROXINE SODIUM 50 MCG: 50 TABLET ORAL at 08:07

## 2024-03-27 RX ADMIN — LEVETIRACETAM 500 MG: 500 TABLET, FILM COATED ORAL at 08:07

## 2024-03-27 ASSESSMENT — PAIN SCALES - PAIN ASSESSMENT IN ADVANCED DEMENTIA (PAINAD)
TOTALSCORE: 0
BODYLANGUAGE: RELAXED
FACIALEXPRESSION: SMILING OR INEXPRESSIVE
CONSOLABILITY: NO NEED TO CONSOLE
BODYLANGUAGE: RELAXED
BREATHING: NORMAL
TOTALSCORE: 0
BREATHING: NORMAL
FACIALEXPRESSION: SMILING OR INEXPRESSIVE
CONSOLABILITY: NO NEED TO CONSOLE

## 2024-03-27 NOTE — PROGRESS NOTES
Physical Therapy  Daily Treatment Note    Discharge Recommendation:  Skilled Nursing Facility  Equipment Needs:  Defer to next level of care    Assessment:  Pt with good participation for therapy. Needing cues for safety with walker for transfers and ambulation. Mobility slow and weak. Pt would benefit from continued IP PT at D/C to maximize function and independence. Plan is for SNF.     Chart Reviewed: Yes     Other position/activity restrictions: Up with assistance   Additional Pertinent Hx: Pt to ED 3/24 for falls and left sided weakness.  Imaging found to have a high right frontal parietal parenchymal and SAH with mild vasogenic edema.  Pt admitted to ICU.  PMH:  HTN, CKD, Alzheimer's disease      Diagnosis: Intraparenchymal  hemorrhage   Treatment Diagnosis: Decreased gait associated with intraparenchymal hemorrhage.    Subjective: Pt in bed initially. Agreeable to working with PT.   Oriented to self only. Pleasant and talkative, although conversation not relating to current situation/circumstances.     Pain: Denies    Objective:    Bed mobility  Supine to sit: Min assist x 1, HOB up partially with use of rail  Scooting: Min assist to EB  Sit to Supine: Min assist x 1, HOB up partially    Transfers  Sit to stand: Min assist x 1 from bed; Min assist x 1 from chair (twice)  Stand to sit: Min assist x 1 into chair (twice); Min assist x 1 onto bed   Other: Needing mod cues verbal + tactile cues for hand placement with transfers (pt tends to hold onto walker)    Ambulation  Assistance Level: Min assist (regressing to Mod assist with turning/backing up to sit)  Assistive device: Rolling walker  Distance: 2 ft x 2 (bed <> chair), 15 ft, 60 ft x 2. Seated rests between.   Quality of gait: Narrow CAPRI; decreased step length/height; weak; occasional unsteadiness; decreased pace; veering L; needing occasional cues to avoid running into walls/objects on L  Other: Pt needing cues to keep feet within walker space, especially

## 2024-03-27 NOTE — PROGRESS NOTES
V2.0    [facility] [place]      Name:  Doreen Gilbert /Age/Sex: 1935  (89 y.o. female)   MRN & CSN:  [id number] & [id number] Encounter Date/Time: 3/26/2024 10:47 PM EDT   Location:  4504/4504-01 PCP: Odalys Dale MD     Attending:Denton Mccoy MD       Hospital Day: 3    Assessment and Recommendations   Doreen Gilbert is a 89 y.o. female with pmh of Alzheimer's dementia, CKD3, HTN,HLD who  presented with left sided weakness     noted around 3 PM difficulty with getting left foot into the car, in the evening 8:15 PM was noted to be holding the wall for support to ambulate.   While walked she fell face forwards, then crawled to the couch and reported left foot hurting. In the ER noted to have abnormal UA moderate leukocyte estrace, negative nitrate, WBC 10-20, 4+ bacteria.     Imaging: CT head w/o contrast, \"High right frontal parietal parenchymal and subarachnoid hemorrhage with mild vasogenic edema. Small right tentorial subdural hemorrhage. No mass effect or midline shift\".     Admitted to ICU for further management    Right Frontal Parietal ICH and SAH, with Vasogenic edema, with out MLS, possible traumatic   Repeat CT is stable and CTA and CTV negative  - Continue neuro checks q 4 hours  - No NS intervention planned  - Ok for Dvt Px per NCC  - Ok to transition to 5T  - MRI has been ordered but pending    Acute cystitis - continue Abx, reviewed sensitivities    Hypothyroidism - continue synthroid    Alzheimer's dementia - delirium precautions      Diet ADULT DIET; Regular   DVT Prophylaxis [] Lovenox, [x]  Heparin, [] SCDs, [] Ambulation,  [] Eliquis, [] Xarelto  [] Coumadin   Code Status DNR-CCA   Disposition From: Home  Expected Disposition: possible SNF vs HHC   Estimated Date of Discharge: 24   Surrogate Decision Maker/ MARCO ANTONIO    johnathan ospina [phone]        Personally reviewed Lab Studies and Imaging   Discussed management of the case

## 2024-03-27 NOTE — DISCHARGE SUMMARY
Discharge:     Physical and occupational therapy  Antibiotics for urinary tract infection x 5 days  Keppra for seizure prophylaxis x 7 days  Antihypertensive medications  Hold all full dose anticoagulation and antiplatelet medications x 2 weeks  Ok for DVT Px dose of Heparin  Severe dementia (very pleasant overall), please delirium precautions    Primary care physician: Odalys Dale MD within 2 weeks  Diet: regular diet   Activity: activity as tolerated  Disposition: Discharged to: Skilled nursing facility, palliative care follow-up  Condition on discharge: Stable    Discharge Medications:        Medication List        START taking these medications      cephALEXin 500 MG capsule  Commonly known as: KEFLEX  Take 1 capsule by mouth in the morning and 1 capsule in the evening. Do all this for 7 doses.     levETIRAcetam 500 MG tablet  Commonly known as: KEPPRA  Take 1 tablet by mouth 2 times daily for 7 days            CONTINUE taking these medications      amLODIPine 5 MG tablet  Commonly known as: NORVASC  Take 1 tablet by mouth daily     atorvastatin 10 MG tablet  Commonly known as: LIPITOR  Take 1 tablet by mouth daily     levothyroxine 50 MCG tablet  Commonly known as: SYNTHROID  TAKE 1 TABLET BY MOUTH EVERY DAY     losartan 100 MG tablet  Commonly known as: COZAAR  TAKE 1 TABLET BY MOUTH EVERY DAY            STOP taking these medications      vitamin D3 25 MCG (1000 UT) Tabs tablet  Generic drug: vitamin D               Where to Get Your Medications        Information about where to get these medications is not yet available    Ask your nurse or doctor about these medications  cephALEXin 500 MG capsule  levETIRAcetam 500 MG tablet        Objective Findings at Discharge:   /69   Pulse 65   Temp 97.4 °F (36.3 °C) (Temporal)   Resp 18   Ht 1.676 m (5' 6\")   Wt 64 kg (141 lb)   LMP  (LMP Unknown)   SpO2 98%   BMI 22.76 kg/m²       Physical Exam:   General: elderly female, very pleasant  enlarged nodes. PERITONEUM/RETROPERITONEUM: No ascites or free air. VESSELS: Aorta is normal in caliber. Major intra-abdominal vessels are patent. ABDOMINAL WALL: Normal. BONES: No significant abnormality. Remote healed fracture of the left pubic symphysis and left superior pubic ramus. IMPRESSION: 1. No acute findings in the abdomen or pelvis. Electronically signed by Erwin Ordoñez MD    CTA HEAD NECK W CONTRAST    Result Date: 3/24/2024  CTA HEAD NECK W CONTRAST COMPARISON: None. HISTORY: 89 years Female; \"Stroke Symptoms\". TECHNIQUE: CTA brain and neck with contrast per standard protocol were obtained. Multiplanar reformatted images and 3D/MIP images are provided for review. Up-to-date CT equipment and radiation dose reduction techniques were employed. FINDINGS:  Large portions of the exam are severely motion degraded and are nondiagnostic. The aortic arch and the origins of the great vessels are patent. Otherwise, vessels of the neck and [place] are poorly evaluated due to severe motion artifact. No evidence of vascular malformation or mass in the region of hemorrhage in the right frontal parietal lobe.     1.  Essentially nondiagnostic severely motion degraded exam. 2.  No evidence of vascular malformation or mass in the region of hemorrhage in the high right frontal/parietal lobe. Electronically signed by Silviano Aleman MD    XR CHEST PORTABLE    Result Date: 3/24/2024  EXAM: XR CHEST PORTABLE INDICATION: 89 years Female; \"stroke symptoms\" COMPARISON: 10/31/2015 FINDINGS: Normal cardiomediastinal silhouette. Thoracic aorta is atherosclerotic. No consolidation, pleural effusion, or pneumothorax. No acute osseous abnormality.     No acute cardiopulmonary findings. Electronically signed by Silviano Aleman MD    CT HEAD WO CONTRAST    Result Date: 3/24/2024  CT HEAD WO CONTRAST HISTORY: 89 years Female; \"Stroke Symptoms\" COMPARISON: MRI brain 8/7/2020 TECHNIQUE: CT brain without contrast per standard protocol.

## 2024-03-27 NOTE — PROGRESS NOTES
Patient discharged to skilled nursing facility. Report attempted to be called to nurse, but was on hold for 5+ mins. Will try again soon. AVS and STORM sent with patient. IV and tele removed. All belongings sent with patient. Patient confused but pleasant. All needs met and all questions answered.   Alicia Damon RN

## 2024-03-27 NOTE — CARE COORDINATION
Case Management Assessment            Discharge Note                    Date / Time of Note: 3/27/2024 9:01 AM                  Discharge Note Completed by: Kaci Gifford RN    Ms. Gilbert will discharge to the [facility] around 3P via [facility]    RN CALL REPORT [phone]  AVS [phone]    Ms. Gilbert's son, Parth was called. He is aware of the DC date time. He remains in agreement with the discharge plan.    Patient Name: Doreen Gilbert   YOB: 1935  Diagnosis: Intracranial hemorrhage (HCC) [I62.9]  Left-sided sensory deficit present [R44.9]  Acute left-sided weakness [R53.1]  Intraparenchymal hemorrhage of brain (HCC) [I61.9]   Date / Time: 3/24/2024  9:11 PM    Current PCP: Odalys Dale MD    Advance Directives:  Code Status: DNR-CCA    Financial:  Payor: [employer] MEDICARE / Plan: [employer] CHOICE-PPO MEDICARE / Product Type: *No Product type* /      Pharmacy:    [id number] - [facility] [address] [place] [address] - P [phone] - F [phone]  [address] [address]  [place] [address]  Phone: [phone] Fax: [phone]    ADLS:  Current PT AM-PAC Score: 15 /24  Current OT AM-PAC Score: 14 /24    DISCHARGE Disposition:     [facility]  Address: [address] [place] [address]  Hours: Open 24 hours  Phone: [phone]    LOC at discharge: Skilled  STORM Completed: Yes    Notification completed in HENS/PAS?:  Document ID : [id number]     IMM Completed:   Yes, Case management has presented and reviewed IMM letter #2 to the patient and/or family/ POA. Patient and/or family/POA verbalized understanding of their medicare rights and appeal process if needed. Patient and/or family/POA has signed and placed today's date (3/27/24) and time (0900) on letter #2 on the the appropriate lines. Patient and/or family/POA, provided copy of signed letter and they are aware that this original copy of IMM letter #2

## 2024-03-27 NOTE — PLAN OF CARE
Problem: Discharge Planning  Goal: Discharge to home or other facility with appropriate resources  Outcome: Progressing     Problem: Safety - Adult  Goal: Free from fall injury  3/26/2024 0607 by Derik Ford RN  Outcome: Progressing    Problem: Neurosensory - Adult  Goal: Achieves stable or improved neurological status  3/26/2024 0607 by Derik Ford RN  Outcome: Progressing    Goal: Absence of seizures  3/26/2024 0607 by Derik Ford RN  Outcome: Progressing    Goal: Remains free of injury related to seizures activity  3/26/2024 0607 by Derik Ford RN  Outcome: Progressing    Goal: Achieves maximal functionality and self care  3/26/2024 0607 by Derik Ford RN  Outcome: Progressing       Problem: Skin/Tissue Integrity - Adult  Goal: Skin integrity remains intact  3/26/2024 0607 by Derik Ford RN  Outcome: Progressing       Problem: Safety - Medical Restraint  Goal: Remains free of injury from restraints (Restraint for Interference with Medical Device)  Description: INTERVENTIONS:  1. Determine that other, less restrictive measures have been tried or would not be effective before applying the restraint  2. Evaluate the patient's condition at the time of restraint application  3. Inform patient/family regarding the reason for restraint  4. Q2H: Monitor safety, psychosocial status, comfort, nutrition and hydration  Outcome: Progressing     
  Problem: Discharge Planning  Goal: Discharge to home or other facility with appropriate resources  Outcome: Progressing     Problem: Safety - Adult  Goal: Free from fall injury  Outcome: Progressing     
  Problem: Discharge Planning  Goal: Discharge to home or other facility with appropriate resources  Outcome: Progressing     Problem: Safety - Adult  Goal: Free from fall injury  Outcome: Progressing     Problem: Neurosensory - Adult  Goal: Achieves stable or improved neurological status  Outcome: Progressing     Problem: Neurosensory - Adult  Goal: Absence of seizures  Outcome: Progressing     Problem: Neurosensory - Adult  Goal: Remains free of injury related to seizures activity  Outcome: Progressing     Problem: Neurosensory - Adult  Goal: Achieves maximal functionality and self care  Outcome: Progressing     Problem: Skin/Tissue Integrity - Adult  Goal: Skin integrity remains intact  Outcome: Progressing     Problem: Skin/Tissue Integrity - Adult  Goal: Incisions, wounds, or drain sites healing without S/S of infection  Outcome: Progressing     Problem: Skin/Tissue Integrity - Adult  Goal: Oral mucous membranes remain intact  Outcome: Progressing     
  Problem: SLP Adult - Impaired Swallowing  Goal: By Discharge: Advance to least restrictive diet without signs or symptoms of aspiration for planned discharge setting.  See evaluation for individualized goals.  Outcome: Progressing     
  Problem: Safety - Adult  Goal: Free from fall injury  Outcome: Progressing  Note: Fall risk protocol in place: Bed alarm on, fall risk bracelet applied, yellow indicator in hallway on, non-skid footwear in use.  Patient/family educated on fall risk protocol, instructed to call for assistance when needed.     Problem: Neurosensory - Adult  Goal: Achieves stable or improved neurological status  Outcome: Progressing  Flowsheets (Taken 3/25/2024 1913)  Achieves stable or improved neurological status:   Assess for and report changes in neurological status   Initiate measures to prevent increased intracranial pressure   Maintain blood pressure and fluid volume within ordered parameters to optimize cerebral perfusion and minimize risk of hemorrhage   Monitor temperature, glucose, and sodium. Initiate appropriate interventions as ordered  Goal: Absence of seizures  Outcome: Progressing  Goal: Remains free of injury related to seizures activity  Outcome: Progressing  Goal: Achieves maximal functionality and self care  Outcome: Progressing     Problem: Skin/Tissue Integrity - Adult  Goal: Skin integrity remains intact  Outcome: Progressing  Note: Patient turns self adequately in bed, Sacral Heart Mepilex in place to protect, skin intact underneath.     Problem: SLP Adult - Impaired Swallowing  Goal: By Discharge: Advance to least restrictive diet without signs or symptoms of aspiration for planned discharge setting.  See evaluation for individualized goals.  3/25/2024 0951 by Sb Guzmán, SLP  Outcome: Progressing     
Neurology Plan of Care     Patient is a 89 y.o. y/o female, neurology consulted for R frontal parietal ICH / SAH s/p fall all likely related to fall, however location is not common for traumatic ICH. No on anticoag or antiplt at home. Repeat imaging stable. Family would like to have MRI completed to complete work up.     Plan:   MRI brain w/ & w/o pending   Will f/u after imaging obtained.   Please call with questions.     RAMIREZ Simon - CNP  03/27/24  12:42 PM      
Symptoms of Infection:   TWICE DAILY: Assess and document skin integrity   TWICE DAILY: Assess and document dressing/incision, wound bed, drain sites and surrounding tissue   Implement wound care per orders   Initiate isolation precautions as appropriate   Initiate pressure ulcer prevention bundle as indicated     Problem: Skin/Tissue Integrity - Adult  Goal: Oral mucous membranes remain intact  3/26/2024 1749 by Malena Rico, RN  Outcome: Progressing  Flowsheets (Taken 3/26/2024 0800)  Oral Mucous Membranes Remain Intact:   Assess oral mucosa and hygiene practices   Implement preventative oral hygiene regimen   Implement oral medicated treatments as ordered     Problem: Safety - Medical Restraint  Goal: Remains free of injury from restraints (Restraint for Interference with Medical Device)  Description: INTERVENTIONS:  1. Determine that other, less restrictive measures have been tried or would not be effective before applying the restraint  2. Evaluate the patient's condition at the time of restraint application  3. Inform patient/family regarding the reason for restraint  4. Q2H: Monitor safety, psychosocial status, comfort, nutrition and hydration  3/26/2024 1749 by Malena Rico RN  Outcome: Progressing  Flowsheets (Taken 3/26/2024 1700)  Remains free of injury from restraints (restraint for interference with medical device):   Inform patient/family regarding the reason for restraint   Determine that other, less restrictive measures have been tried or would not be effective before applying the restraint   Evaluate the patient's condition at the time of restraint application   Every 2 hours: Monitor safety, psychosocial status, comfort, nutrition and hydration

## 2024-03-27 NOTE — DISCHARGE INSTR - COC
Continuity of Care Form    Patient Name: Doreen Gilbert   :  1935  MRN:  [id number]    Admit date:  3/24/2024  Discharge date:  3/27/2024  Code Status Order: DNR-CCA   Advance Directives:     Admitting Physician:  Beth Izquierdo MD  PCP: Odalys Dale MD    Discharging Nurse:   Discharging Hospital Unit/Room#: 4504/4504-01  Discharging Unit Phone Number: [phone]    Emergency Contact:   Extended Emergency Contact Information  Primary Emergency Contact: johnathan ospina  Home Phone: [phone]  Work Phone: [phone]  Mobile Phone: [phone]  Relation: Child   needed? No  Secondary Emergency Contact: Catherine Gonzales  Address: [address] [place] [address]            [place] [address] [facility]  Home Phone: [phone]  Relation: Brother/Sister   needed? No    Past Surgical History:  Past Surgical History:   Procedure Laterality Date    APPENDECTOMY      CATARACT REMOVAL      HYSTERECTOMY, VAGINAL      TONSILLECTOMY N/A        Immunization History:   Immunization History   Administered Date(s) Administered    COVID-19, J&J, (age 18y+), IM, 0.5 mL 2021    Influenza, FLUAD, (age 65 y+), Adjuvanted, 0.5mL 2021, 2022    Influenza, High Dose (Fluzone 65 yrs and older) 2002, 10/10/2003, 10/05/2004, 2010, 2013, 2014, 2016, 2018, 10/21/2020    Influenza, Triv, inactivated, subunit, adjuvanted, IM (Fluad 65 yrs and older) 2019    Pneumococcal, PCV-13, PREVNAR 13, (age 6w+), IM, 0.5mL 2017    Pneumococcal, PPSV23, PNEUMOVAX 23, (age 2y+), SC/IM, 0.5mL 2002, 2015    Td vaccine (adult) 2014    Td, unspecified formulation 2014       Active Problems:  Patient Active Problem List   Diagnosis Code    Hiatal hernia K44.9    GERD (gastroesophageal reflux disease) K21.9    Hypercholesterolemia E78.00    Hyperuricemia E79.0    Renal impairment N28.9    Hyperlipidemia E78.5    Hypertension I10     bearing restrictions  Other Medical Equipment (for information only, NOT a DME order):  neva    RN SIGNATURE:      CASE MANAGEMENT/SOCIAL WORK SECTION    Inpatient Status Date: 3/27/2024    Readmission Risk Assessment Score:  Readmission Risk              Risk of Unplanned Readmission:  11           Discharging to Facility/ Agency   [facility]  Address: [address] , [place] [address]  Hours: Open 24 hours  Phone: [phone]    / signature: Electronically signed by Kaci Gifford RN on 3/27/24 at 9:16 AM EDT    PHYSICIAN SECTION    Prognosis: Fair    Condition at Discharge: Stable    Rehab Potential (if transferring to Rehab): Fair    Recommended Labs or Other Treatments After Discharge:     Physical and occupational therapy  Antibiotics for urinary tract infection x 5 days  Keppra for seizure prophylaxis x 7 days  Antihypertensive medications  Hold all full dose anticoagulation and antiplatelet medications x 2 weeks  Ok for DVT Px dose of Heparin  Severe dementia (very pleasant overall), please     Physician Certification: I certify the above information and transfer of Doreen Gilbert  is necessary for the continuing treatment of the diagnosis listed and that she requires Skilled Nursing Facility for less 30 days.     Update Admission H&P: No change in H&P    PHYSICIAN SIGNATURE:  Electronically signed by Denton Mccoy MD on 3/27/24 at 12:34 PM EDT

## 2024-03-28 NOTE — PROGRESS NOTES
Physician Progress Note      PATIENT:               ASCENCION BURNS  [facility] #:                  [id number]  :                       1935  ADMIT DATE:       3/24/2024 9:11 PM  DISCH DATE:        3/27/2024 3:00 PM  RESPONDING  PROVIDER #:        Denton Mccoy MD          QUERY TEXT:    Pt admitted with Subdural and subarachnoid hemorrhage.  If possible, please   document in progress notes and discharge summary type of ICH and the length of   loss of consciousness, if any:      The medical record reflects the following:  Risk Factors: 88 y/o with falls and BP was elevated and parenchymal bleed   present.??  Clinical Indicators: 3/25 PN: \"Per son, patient had trouble getting her left   foot into the car around 3 pm.  Later that afternoon at home, she was going to   sit down on a chair but fell to the ground, son says patient did not hit her   head.  Patient was able to get up and walk around after the.  Around 8:15pm,   just after patient had finished brushing her teeth and was leaving the   bathroom, son saw her holding onto the nixon for support.  She walked into the   living room and then suddenly fell face down onto her hands: HTN  Patient not always compliant with home meds.  She has Alzheimer's and some   days will not cooperate with taking her medication.\"  Treatment: MRI and MRV, Keppra, SBP<160, Cardene gtt, Q1hour neurochecks,   neurology consult  Options provided:  -- Traumatic ICH due fall without LOC  -- Traumatic ICH with LOC 30 minutes or less  -- Traumatic ICH with LOC length of time unknown: This patient has a traumatic   ICH with LOC time unknown.  -- Other - I will add my own diagnosis  -- Disagree - Not applicable / Not valid  -- Disagree - Clinically unable to determine / Unknown  -- Refer to Clinical Documentation Reviewer    PROVIDER RESPONSE TEXT:    ICH, unable to determine if traumatic or if LOC    Query created by: China Murphy on 3/27/2024 12:38 PM      Electronically signed by:   Denton Mccoy MD 3/27/2024 8:36 PM

## 2024-04-11 RX ORDER — ATORVASTATIN CALCIUM 10 MG/1
10 TABLET, FILM COATED ORAL DAILY
Qty: 90 TABLET | Refills: 1 | Status: SHIPPED | OUTPATIENT
Start: 2024-04-11

## 2024-04-15 ENCOUNTER — TELEPHONE (OUTPATIENT)
Dept: INTERNAL MEDICINE CLINIC | Age: 89
End: 2024-04-15

## 2024-04-15 NOTE — TELEPHONE ENCOUNTER
Patient discharged from [facility] on 4/14/24.  She has been scheduled for a HFU on 4/22/24 with Dr. Dale

## 2024-04-22 ENCOUNTER — OFFICE VISIT (OUTPATIENT)
Dept: INTERNAL MEDICINE CLINIC | Age: 89
End: 2024-04-22

## 2024-04-22 ENCOUNTER — TELEPHONE (OUTPATIENT)
Dept: INTERNAL MEDICINE CLINIC | Age: 89
End: 2024-04-22

## 2024-04-22 VITALS
HEART RATE: 65 BPM | WEIGHT: 128.6 LBS | OXYGEN SATURATION: 98 % | SYSTOLIC BLOOD PRESSURE: 140 MMHG | DIASTOLIC BLOOD PRESSURE: 78 MMHG | BODY MASS INDEX: 20.76 KG/M2

## 2024-04-22 DIAGNOSIS — Z09 HOSPITAL DISCHARGE FOLLOW-UP: Primary | ICD-10-CM

## 2024-04-22 NOTE — TELEPHONE ENCOUNTER
Please call pt's son Manual to discuss how he can better manage his mom at home. He says she didn't qualify for SNF or Hospice so he is trying to fill in the gaps best he can.

## 2024-04-22 NOTE — PROGRESS NOTES
medications ordered at time of hospital discharge: Yes    Pt denies any complaints.        Objective:    BP (!) 140/78   Pulse 65   Wt 58.3 kg (128 lb 9.6 oz)   LMP  (LMP Unknown)   SpO2 98%   BMI 20.76 kg/m²     Patient is smiling and appears happy.  She is ambulatory with holding hands with her son.  Heart is regular rate and rhythm.  Lungs are clear to auscultation.  She is alert but not oriented.    An electronic signature was used to authenticate this note.  --IAN DALAL MD

## 2024-04-22 NOTE — PATIENT INSTRUCTIONS
Due to difficulty with taking meds, just take multivitamin instead of different separate vitamins  Can stop Atorvastatin  Try to take at least one of the bp meds each day,preferably both  Take the levothyroxine daily

## 2024-04-23 ENCOUNTER — CARE COORDINATION (OUTPATIENT)
Dept: CARE COORDINATION | Age: 89
End: 2024-04-23

## 2024-04-23 NOTE — CARE COORDINATION
PCP referral    Pt son needs help w resources and mgt of alz disease w his mom.    Call to patient son,LM on VM with this ACM name and number    Will cont outreach

## 2024-04-24 ENCOUNTER — CARE COORDINATION (OUTPATIENT)
Dept: CARE COORDINATION | Age: 89
End: 2024-04-24

## 2024-04-24 NOTE — CARE COORDINATION
Ambulatory Care Coordination Note  2024    Patient Current Location:  [place]    ACM contacted the family and caregiver by telephone. Verified name and  with family and caregiver as identifiers. Provided introduction to self, and explanation of the ACM role.     ACM: Miranda Jack RN    Challenges to be reviewed by the provider   Additional needs identified to be addressed with provider: No  none               Method of communication with provider: chart routing.    Referral from PCP  Re resources for son who is caregiver, pt has Alzheimers.    REcent IP stay 3/24- 3/27  89-year-old female  pmh of Alzheimer's dementia, CKD3, HTN,HLD who  presented with left sided weakness   Workup showed right frontal parietal intracranial hemorrhage/subarachnoid hemorrhage status post fall, location not very common for traumatic intracranial hemorrhage, patient is not on anticoagulation  Neurology recommended MRI for completion of workup and ensure there is no underlying tumor that would have led to the bleed.  An MRI with and without contrast was ordered by neurology, for further discussion with the son with underlying patient's dementia if decided to hold off on MRI imaging.     Patient was also found to have abnormal UA, continued antibiotics and on discharge recommend to continue cephalexin for another 3 days to complete treatment course.     Other chronic medical problems included hypothyroidism for which Synthroid will be continued  For Alzheimer's dementia and recommended delirium precautions.     Patient be discharged to skilled nursing facility with palliative care  If patient deteriorates then they will opt for hospice care  Dcd to [facility]  Home   HOC did eval at [facility] and pt did not qualify,      Return call to Parth, pt son  Briefly discussed status   Had assessment w [facility] on Fri and pia be providing 60 days of Free Aid 2 days a week 2 hours/ day for help w bathing  Parth reports that currently

## 2024-04-26 NOTE — FLOWSHEET NOTE
Bill For Surgical Tray: no [facility] and [address],  [facility], [facility] [address]                  [address] [facility],[address]        Keila Blanco  Phone: [phone]                                                                          Fax: [phone]        Date:  2022    Patient Name:  Channing Boyce    :  1935  MRN: [id number]  Restrictions/Precautions:    Medical/Treatment Diagnosis Information:  Diagnosis: M25.511 (ICD-10-CM) - Right shoulder pain, unspecified [id number] (ICD-10-CM) - Rotator cuff dysfunction, right  Treatment Diagnosis: R Shoulder Pain N14.417  Insurance/Certification information:  PT Insurance Information:  W Edwin Bello  Physician Information:  Jose Armando Monet MD  Has the plan of care been signed (Y/N):        [x]  Yes  []  No     Date of Patient follow up with Physician: 22      Is this a Progress Report:     []  Yes  [x]  No        If Yes:  Date Range for reporting period:  Beginning 22  Ending    Progress report will be due (10 Rx or 30 days whichever is less):        Recertification will be due (POC Duration  / 90 days whichever is less): 22      Visit # Insurance Allowable Auth Required   In-person 5 BMN []  Yes [x]  No          Functional Scale: FOTO: 56/100    Date assessed:  22       Number of Comorbidities:  []0     [x]1-2    []3+    Latex Allergy:  [x]NO      []YES  Preferred Language for Healthcare:   [x]English       []other:      Pain level:  0-1/10 @ rest     SUBJECTIVE:  Patient reports that she is having very little pain at rest, she does have some dull acheyness still along her proximal biceps tendon. She is not having much of an issue with any one particular activity at home.     OBJECTIVE:    Observation: TrP noted throughout upper trap which did reproduce familiar pain; less anterior shoulder pain this visit   Test measurements:      RESTRICTIONS/PRECAUTIONS: None of Billing Type: Third-Party Bill note    Exercises/Interventions:     Therapeutic Ex (09966)/NMR re-education (21892) Sets/sec Notes/CUES   SB flexion; s/s x20/x15    Pendulums (fwd/bk; s/s) x10 ea way    TB Row  TB extension  TB ER  TB IR 3x12  3x10  2x10  2x10 BTB  RTB  RTB  BTB     DB curls   3x12   2#   Wall walks towel 2x10                                  PT ED: Tech for HEP, adjusting reps and sets to avoid shoulder aggravation 15'    Manual Intervention (72511)     PROM of shoulder, joint mobs for increased range 10'                  HEP instruction: Access Code: [email or web address]  URL: [email or web address]  Date: 06/21/2022  Prepared by: Violet Adamson    Exercises  Standing Shoulder and Trunk Flexion at Table - 1 x daily - 7 x weekly - 1 sets - 10 reps  Flexion-Extension Shoulder Pendulum with Table Support - 2 x daily - 7 x weekly - 1 sets - 10 reps  Horizontal Shoulder Pendulum with Table Support - 2 x daily - 7 x weekly - 1 sets - 10 reps  Shoulder External Rotation with Anchored Resistance - 1 x daily - 7 x weekly - 3 sets - 10 reps  Shoulder Internal Rotation with Resistance - 1 x daily - 7 x weekly - 3 sets - 10 reps  Standing Shoulder Row with Anchored Resistance - 1 x daily - 7 x weekly - 3 sets - 10 reps        Therapeutic Exercise and NMR EXR  [x] (07725) Provided verbal/tactile cueing for activities related to strengthening, flexibility, endurance, ROM  for improvements in scapular, scapulothoracic and UE control with self care, reaching, carrying, lifting, house/yardwork, driving/computer work.    [] (96710) Provided verbal/tactile cueing for activities related to improving balance, coordination, kinesthetic sense, posture, motor skill, proprioception  to assist with  scapular, scapulothoracic and UE control with self care, reaching, carrying, lifting, house/yardwork, driving/computer work.     Therapeutic Activities:    [x] (03548 or 93467) Provided verbal/tactile cueing for activities related to improving balance, Performing Laboratory: -2795 coordination, kinesthetic sense, posture, motor skill, proprioception and motor activation to allow for proper function of scapular, scapulothoracic and UE control with self care, carrying, lifting, driving/computer work. Home Exercise Program:    [x] (01406) Reviewed/Progressed HEP activities related to strengthening, flexibility, endurance, ROM of scapular, scapulothoracic and UE control with self care, reaching, carrying, lifting, house/yardwork, driving/computer work  [] (76611) Reviewed/Progressed HEP activities related to improving balance, coordination, kinesthetic sense, posture, motor skill, proprioception of scapular, scapulothoracic and UE control with self care, reaching, carrying, lifting, house/yardwork, driving/computer work      Manual Treatments:  PROM / STM / Oscillations-Mobs:  G-I, II, III, IV (PA's, Inf., Post.)  [x] (04297) Provided manual therapy to mobilize soft tissue/joints of cervical/CT, scapular GHJ and UE for the purpose of modulating pain, promoting relaxation,  increasing ROM, reducing/eliminating soft tissue swelling/inflammation/restriction, improving soft tissue extensibility and allowing for proper ROM for normal function with self care, reaching, carrying, lifting, house/yardwork, driving/computer work    Modalities:      Charges  Timed Code Treatment Minutes: 40   Total Treatment Minutes: 40     [] EVAL (LOW) 65162   [] EVAL (MOD) 56113   [] EVAL (HIGH) 99562   [] RE-EVAL     [x] LN(08646) x 2    [] IONTO  [] NMR (10546) x     [] VASO  [x] Manual (73028) x 1     [] Other:  [] TA x      [] Mech Traction (78295)  [] ES(attended) (40897)      [] ES (un) (77931):     GOALS:  Patient stated goal: To get rid of the pain in her shoulder  [] Progressing: [] Met: [] Not Met: [] Adjusted    Therapist goals for Patient:   Short Term Goals: To be achieved in: 2 weeks  1. Independent in HEP and progression per patient tolerance, in order to prevent re-injury.    [] Progressing: [] Met: [] Expected Date Of Service: 04/26/2024 Not Met: [] Adjusted  2. Patient will have a decrease in pain to facilitate improvement in movement, function, and ADLs as indicated by Functional Deficits. [] Progressing: [] Met: [] Not Met: [] Adjusted    Long Term Goals: To be achieved in: 12 weeks  1. Disability index score of 64 or more per FOTO to assist with reaching prior level of function. [] Progressing: [] Met: [] Not Met: [] Adjusted  2. Patient will demonstrate an increase in Strength to 4/5 globally in her shoulders to allow for proper functional mobility as indicated by patients Functional Deficits. [] Progressing: [] Met: [] Not Met: [] Adjusted  3. Patient will return to all ADL's and household functional activities without increased symptoms or restriction. [] Progressing: [] Met: [] Not Met: [] Adjusted    ASSESSMENT:  Ramirez Queen continues to make good progress with her time in rehab. She continues to report some slight anterior shoulder pain and was educated today that due to how long she has had this pain that it will likely be some time before it goes away if it does at all. She does not, however, report any true dysfunction at home outside of the light anterior shoulder pain. She would continue to benefit from skilled physical therapy to maximize her functional outcomes and progress towards goals. Overall Progression Towards Functional goals/ Treatment Progress Update:  [] Patient is progressing as expected towards functional goals listed. [] Progression is slowed due to complexities/Impairments listed. [] Progression has been slowed due to co-morbidities.   [x] Plan just implemented, too soon to assess goals progression <30days   [] Goals require adjustment due to lack of progress  [] Patient is not progressing as expected and requires additional follow up with physician  [] Other    Prognosis for POC: [x] Good [] Fair  [] Poor      Patient requires continued skilled intervention: [x] Yes  [] No    Treatment/Activity Tolerance:  [x] Patient able to complete treatment  [] Patient limited by fatigue  [] Patient limited by pain    [] Patient limited by other medical complications  [] Other:       PLAN:  [] Continue per plan of care [] Alter current plan (see comments above)  [x] Plan of care initiated [] Hold pending MD visit [] Discharge      Electronically signed by:  Abby Díaz PT    Note: If patient does not return for scheduled/ recommended follow up visits, this note will serve as a discharge from care along with most recent update on progress.

## 2024-05-02 ENCOUNTER — CARE COORDINATION (OUTPATIENT)
Dept: CARE COORDINATION | Age: 89
End: 2024-05-02

## 2024-05-02 NOTE — CARE COORDINATION
options  [facility] celestina number  [facility] - still waiting on aide  Consider [facility] referral      Offered patient enrollment in the Remote Patient Monitoring (RPM) program for in-home monitoring: Yes, but did not enroll at this time: limited patient ability to navigate RPM/equipment.    Lab Results       None            Care Coordination Interventions    Referral from Primary Care Provider: Yes  Suggested Interventions and Community Resources  Disease Specific Clinic: In Process (Comment: felipe Cedeño)  Palliative Care: In Process  Senior Services: In Process (Comment: aide)          Goals Addressed    None         Future Appointments   Date Time Provider Department Center   7/11/2024 10:00 AM Odalys Dale MD [facility] [place] - [facility]

## 2024-05-09 ENCOUNTER — CARE COORDINATION (OUTPATIENT)
Dept: CARE COORDINATION | Age: 89
End: 2024-05-09

## 2024-05-09 NOTE — CARE COORDINATION
Ambulatory Care Coordination Note  2024    Patient Current Location:  Home: [address] [place] [id number]     ACM contacted the patient by telephone. Verified name and  with family and caregiver as identifiers. Provided introduction to self, and explanation of the ACM role.     Challenges to be reviewed by the provider   Additional needs identified to be addressed with provider: No  personal care aide-starting next week Mon/ Thurs               Method of communication with provider: chart routing.    ACM: Miranda Jack RN  Call to patient son   Aide starting next week!   Will have more time to reach out to celestina AREVALO w [employer]/ eldercare    PLAN  Will cont outreach    Offered patient enrollment in the Remote Patient Monitoring (RPM) program for in-home monitoring: Patient is not eligible for RPM program because: deferred at this time; will discuss at future followup.    Lab Results       None            Care Coordination Interventions    Referral from Primary Care Provider: Yes  Suggested Interventions and Community Resources  Disease Specific Clinic: In Process (Comment: [facility])  Palliative Care: In Process  Senior Services: In Process (Comment: aide)          Goals Addressed    None         Future Appointments   Date Time Provider Department Center   2024 10:00 AM Odalys Dale MD [facility] [place] [facility]

## 2024-05-10 RX ORDER — LEVOTHYROXINE SODIUM 0.05 MG/1
50 TABLET ORAL DAILY
Qty: 90 TABLET | Refills: 0 | Status: SHIPPED | OUTPATIENT
Start: 2024-05-10

## 2024-05-13 ENCOUNTER — CARE COORDINATION (OUTPATIENT)
Dept: CARE COORDINATION | Age: 89
End: 2024-05-13

## 2024-05-13 NOTE — CARE COORDINATION
Ambulatory Care Coordination Note  2024    Patient Current Location:  Home: [address] [place] [id number]     ACM contacted the patient by telephone. Verified name and  with patient as identifiers. Provided introduction to self, and explanation of the ACM role.     Challenges to be reviewed by the provider   Additional needs identified to be addressed with provider: No  Aide w [employer] [facility] Program w [facility] came out to start services today , but pt refused services               Method of communication with provider: chart routing.    ACM: Miranda Jack RN    Call from son   [facility] w [facility] aide services started today but pt refused services.  Discussed palliative care   Not sure of any C needs ? So could include aide w skilled services??    Offered patient enrollment in the Remote Patient Monitoring (RPM) program for in-home monitoring: Patient is not eligible for RPM program because: unable to monitor .    Lab Results       None            Care Coordination Interventions    Referral from Primary Care Provider: Yes  Suggested Interventions and Community Resources  Disease Specific Clinic: In Process (Comment: [facility])  Palliative Care: In Process  Senior Services: In Process (Comment: aide)          Goals Addressed    None         Future Appointments   Date Time Provider Department Center   2024 10:00 AM Odalys Dale MD [facility] [place]  [place]

## 2024-05-23 ENCOUNTER — CARE COORDINATION (OUTPATIENT)
Dept: CARE COORDINATION | Age: 89
End: 2024-05-23

## 2024-05-23 NOTE — CARE COORDINATION
Ambulatory Care Coordination Note  2024    Patient Current Location:  Home: [facility] [place]  [facility] [place] [facility] [id number]     ACM contacted the family by telephone. Verified name and  with family as identifiers. Provided introduction to self, and explanation of the ACM role.     Challenges to be reviewed by the provider   Additional needs identified to be addressed with provider: No  none               Method of communication with provider: none.    ACM: Miranda Jack RN    Call to patient son   Reports he has contacted [facility] co- considering a few hours a week   Uncle also lives upstairs in home- he is going through [facility]  SW from [facility] mailed out info on facilities in area and he needs to find time to call  In meantime,  system needs immediate repair.  Offered patient enrollment in the Remote Patient Monitoring (RPM) program for in-home monitoring: Patient is not eligible for RPM program because: patient does not have qualifying diagnosis.    Lab Results       None            Care Coordination Interventions    Referral from Primary Care Provider: Yes  Suggested Interventions and Community Resources  Disease Specific Clinic: In Process (Comment: dharmesh Cedeño)  Palliative Care: In Process  Senior Services: In Process (Comment: aide)          Goals Addressed    None         Future Appointments   Date Time Provider Department Center   2024 10:00 AM Odalys Dale MD [facility] [place] - [facility]

## 2024-07-05 ENCOUNTER — PATIENT MESSAGE (OUTPATIENT)
Dept: INTERNAL MEDICINE CLINIC | Age: 89
End: 2024-07-05

## 2024-07-08 SDOH — ECONOMIC STABILITY: HOUSING INSECURITY
IN THE LAST 12 MONTHS, WAS THERE A TIME WHEN YOU DID NOT HAVE A STEADY PLACE TO SLEEP OR SLEPT IN A SHELTER (INCLUDING NOW)?: NO

## 2024-07-08 SDOH — ECONOMIC STABILITY: FOOD INSECURITY: WITHIN THE PAST 12 MONTHS, THE FOOD YOU BOUGHT JUST DIDN'T LAST AND YOU DIDN'T HAVE MONEY TO GET MORE.: NEVER TRUE

## 2024-07-08 SDOH — ECONOMIC STABILITY: FOOD INSECURITY: WITHIN THE PAST 12 MONTHS, YOU WORRIED THAT YOUR FOOD WOULD RUN OUT BEFORE YOU GOT MONEY TO BUY MORE.: NEVER TRUE

## 2024-07-08 SDOH — ECONOMIC STABILITY: INCOME INSECURITY: HOW HARD IS IT FOR YOU TO PAY FOR THE VERY BASICS LIKE FOOD, HOUSING, MEDICAL CARE, AND HEATING?: NOT HARD AT ALL

## 2024-07-08 SDOH — ECONOMIC STABILITY: TRANSPORTATION INSECURITY
IN THE PAST 12 MONTHS, HAS LACK OF TRANSPORTATION KEPT YOU FROM MEETINGS, WORK, OR FROM GETTING THINGS NEEDED FOR DAILY LIVING?: NO

## 2024-07-09 NOTE — TELEPHONE ENCOUNTER
I called and talked to son. Letter was printed and placed up front for him to pickup. He stated he will come pick it up.

## 2024-07-11 ENCOUNTER — OFFICE VISIT (OUTPATIENT)
Dept: INTERNAL MEDICINE CLINIC | Age: 89
End: 2024-07-11
Payer: MEDICARE

## 2024-07-11 VITALS
HEART RATE: 55 BPM | OXYGEN SATURATION: 98 % | DIASTOLIC BLOOD PRESSURE: 84 MMHG | WEIGHT: 124.8 LBS | BODY MASS INDEX: 20.14 KG/M2 | SYSTOLIC BLOOD PRESSURE: 174 MMHG

## 2024-07-11 DIAGNOSIS — G30.1 LATE ONSET ALZHEIMER'S DEMENTIA WITHOUT BEHAVIORAL DISTURBANCE (HCC): ICD-10-CM

## 2024-07-11 DIAGNOSIS — I10 ESSENTIAL HYPERTENSION: Primary | ICD-10-CM

## 2024-07-11 DIAGNOSIS — E03.9 HYPOTHYROIDISM, UNSPECIFIED TYPE: ICD-10-CM

## 2024-07-11 DIAGNOSIS — F02.80 LATE ONSET ALZHEIMER'S DEMENTIA WITHOUT BEHAVIORAL DISTURBANCE (HCC): ICD-10-CM

## 2024-07-11 PROCEDURE — 1036F TOBACCO NON-USER: CPT | Performed by: INTERNAL MEDICINE

## 2024-07-11 PROCEDURE — 1090F PRES/ABSN URINE INCON ASSESS: CPT | Performed by: INTERNAL MEDICINE

## 2024-07-11 PROCEDURE — G8420 CALC BMI NORM PARAMETERS: HCPCS | Performed by: INTERNAL MEDICINE

## 2024-07-11 PROCEDURE — G8427 DOCREV CUR MEDS BY ELIG CLIN: HCPCS | Performed by: INTERNAL MEDICINE

## 2024-07-11 PROCEDURE — 1123F ACP DISCUSS/DSCN MKR DOCD: CPT | Performed by: INTERNAL MEDICINE

## 2024-07-11 PROCEDURE — 99214 OFFICE O/P EST MOD 30 MIN: CPT | Performed by: INTERNAL MEDICINE

## 2024-07-11 PROCEDURE — G2211 COMPLEX E/M VISIT ADD ON: HCPCS | Performed by: INTERNAL MEDICINE

## 2024-07-11 NOTE — PATIENT INSTRUCTIONS
RSV Flu and Covid at pharmacy in 9/24    Take bp meds    Consider technology to help reduce wandering
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
## 2024-11-08 ENCOUNTER — APPOINTMENT (OUTPATIENT)
Dept: GENERAL RADIOLOGY | Age: 89
End: 2024-11-08
Payer: MEDICARE

## 2024-11-08 ENCOUNTER — APPOINTMENT (OUTPATIENT)
Dept: CT IMAGING | Age: 89
End: 2024-11-08
Payer: MEDICARE

## 2024-11-08 ENCOUNTER — HOSPITAL ENCOUNTER (EMERGENCY)
Age: 89
Discharge: HOME | End: 2024-11-08
Attending: EMERGENCY MEDICINE
Payer: MEDICARE

## 2024-11-08 VITALS
DIASTOLIC BLOOD PRESSURE: 105 MMHG | HEART RATE: 67 BPM | RESPIRATION RATE: 18 BRPM | OXYGEN SATURATION: 99 % | TEMPERATURE: 97.6 F | SYSTOLIC BLOOD PRESSURE: 141 MMHG
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
## 2024-11-08 PROCEDURE — 70450 CT HEAD/BRAIN W/O DYE: CPT

## 2024-11-08 PROCEDURE — 73562 X-RAY EXAM OF KNEE 3: CPT

## 2024-11-08 PROCEDURE — 99284 EMERGENCY DEPT VISIT MOD MDM: CPT

## 2024-11-08 ASSESSMENT — ENCOUNTER SYMPTOMS
VOMITING: 0
NAUSEA: 0
CHEST TIGHTNESS: 0
ABDOMINAL PAIN: 0
WHEEZING: 0
DIARRHEA: 0
SHORTNESS OF BREATH: 0
CONSTIPATION: 0

## 2024-11-08 NOTE — DISCHARGE INSTRUCTIONS
You are seen in evaluation after fall.  Your head CT was negative but did not show any evidence of any bleeding in the brain. Your knee x-ray was also normal there is no evidence of any fractures, just degenerative osteoarthritis.  Recommend resting the right knee.  Your knee was wrapped in Ace wrap for support.  I have attached close head injury instructions.  Recommend the use of Tylenol as needed for the pain.  Recommend following up with a PCP as needed.  we discussed return precautions including new neurological symptoms, weakness, gait instability, dizziness lightheadedness or other concerning symptoms.

## 2024-11-08 NOTE — ED NOTES
Patient prepared for and ready to be discharged. Patient discharged at this time in no acute distress after verbalizing understanding of discharge instructions. Patient left after receiving After Visit Summary instructions.      Zenon James RN  11/08/24 0951

## 2024-11-13 ENCOUNTER — OFFICE VISIT (OUTPATIENT)
Dept: INTERNAL MEDICINE CLINIC | Age: 89
End: 2024-11-13

## 2024-11-13 VITALS
DIASTOLIC BLOOD PRESSURE: 80 MMHG | HEART RATE: 63 BPM | WEIGHT: 127.8 LBS | SYSTOLIC BLOOD PRESSURE: 160 MMHG | BODY MASS INDEX: 20.63 KG/M2 | OXYGEN SATURATION: 99 %

## 2024-11-13 DIAGNOSIS — E03.9 HYPOTHYROIDISM, UNSPECIFIED TYPE: ICD-10-CM

## 2024-11-13 DIAGNOSIS — I10 ESSENTIAL HYPERTENSION: ICD-10-CM

## 2024-11-13 DIAGNOSIS — Z11.1 SCREENING-PULMONARY TB: ICD-10-CM

## 2024-11-13 DIAGNOSIS — G30.1 LATE ONSET ALZHEIMER'S DEMENTIA WITHOUT BEHAVIORAL DISTURBANCE (HCC): Primary | ICD-10-CM
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
## 2024-11-13 DIAGNOSIS — F02.80 LATE ONSET ALZHEIMER'S DEMENTIA WITHOUT BEHAVIORAL DISTURBANCE (HCC): Primary | ICD-10-CM

## 2024-11-13 RX ORDER — LOSARTAN POTASSIUM 100 MG/1
TABLET ORAL
Qty: 90 TABLET | Refills: 1 | Status: SHIPPED | OUTPATIENT
Start: 2024-11-13

## 2024-11-13 RX ORDER — AMLODIPINE BESYLATE 10 MG/1
10 TABLET ORAL DAILY
Qty: 90 TABLET | Refills: 1 | Status: SHIPPED | OUTPATIENT
Start: 2024-11-13 | End: 2025-05-12

## 2024-11-13 RX ORDER — LEVOTHYROXINE SODIUM 50 UG/1
50 TABLET ORAL DAILY
Qty: 90 TABLET | Refills: 0 | Status: SHIPPED | OUTPATIENT
Start: 2024-11-13
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
## 2024-11-14 LAB
ALBUMIN SERPL-MCNC: 4.7 G/DL (ref 3.4–5)
ALBUMIN/GLOB SERPL: 2.1 {RATIO} (ref 1.1–2.2)
ALP SERPL-CCNC: 76 U/L (ref 40–129)
ALT SERPL-CCNC: 19 U/L (ref 10–40)
ANION GAP SERPL CALCULATED.3IONS-SCNC: 9 MMOL/L (ref 3–16)
AST SERPL-CCNC: 28 U/L (ref 15–37)
BILIRUB SERPL-MCNC: 0.3 MG/DL (ref 0–1)
BUN SERPL-MCNC: 27 MG/DL (ref 7–20)
CALCIUM SERPL-MCNC: 10 MG/DL (ref 8.3–10.6)
CHLORIDE SERPL-SCNC: 102 MMOL/L (ref 99–110)
CO2 SERPL-SCNC: 27 MMOL/L (ref 21–32)
CREAT SERPL-MCNC: 1.2 MG/DL (ref 0.6–1.2)
DEPRECATED RDW RBC AUTO: 14.7 % (ref 12.4–15.4)
GFR SERPLBLD CREATININE-BSD FMLA CKD-EPI: 44 ML/MIN/{1.73_M2}
GLUCOSE SERPL-MCNC: 98 MG/DL (ref 70–99)
HCT VFR BLD AUTO: 39.5 % (ref 36–48)
HGB BLD-MCNC: 13.2 G/DL (ref 12–16)
MCH RBC QN AUTO: 29.6 PG (ref 26–34)
MCHC RBC AUTO-ENTMCNC: 33.4 G/DL (ref 31–36)
MCV RBC AUTO: 88.5 FL (ref 80–100)
PLATELET # BLD AUTO: NORMAL K/UL (ref 135–450)
PLATELET BLD QL SMEAR: NORMAL
PMV BLD AUTO: NORMAL FL (ref 5–10.5)
POTASSIUM SERPL-SCNC: 4.4 MMOL/L (ref 3.5–5.1)
PROT SERPL-MCNC: 6.9 G/DL (ref 6.4–8.2)
RBC # BLD AUTO: 4.46 M/UL (ref 4–5.2)
SLIDE REVIEW: NORMAL
SODIUM SERPL-SCNC: 138 MMOL/L (ref 136–145)
TSH SERPL DL<=0.005 MIU/L-ACNC: 2.81 UIU/ML (ref 0.27–4.2)
WBC # BLD AUTO: 8.4 K/UL (ref 4–11)

## 2024-11-16 LAB
GAMMA INTERFERON BACKGROUND BLD IA-ACNC: 0.07 IU/ML
M TB IFN-G BLD-IMP: NEGATIVE
M TB IFN-G CD4+ BCKGRND COR BLD-ACNC: 0 IU/ML
M TB IFN-G CD4+CD8+ BCKGRND COR BLD-ACNC: 0 IU/ML
MITOGEN IGNF BCKGRD COR BLD-ACNC: 9.93 IU/ML

## 2024-11-19 ENCOUNTER — APPOINTMENT (OUTPATIENT)
Dept: CT IMAGING | Age: 89
End: 2024-11-19
Payer: MEDICARE

## 2024-11-19 ENCOUNTER — HOSPITAL ENCOUNTER (EMERGENCY)
Age: 89
Discharge: HOME | End: 2024-11-19
Attending: STUDENT IN AN ORGANIZED HEALTH CARE EDUCATION/TRAINING PROGRAM
Payer: MEDICARE

## 2024-11-19 VITALS
SYSTOLIC BLOOD PRESSURE: 131 MMHG | TEMPERATURE: 97.7 F | OXYGEN SATURATION: 96 % | DIASTOLIC BLOOD PRESSURE: 81 MMHG | RESPIRATION RATE: 20 BRPM | HEART RATE: 70 BPM

## 2024-11-19 DIAGNOSIS — R19.7 DIARRHEA, UNSPECIFIED TYPE: ICD-10-CM

## 2024-11-19 DIAGNOSIS — S09.90XA CLOSED HEAD INJURY, INITIAL ENCOUNTER: Primary | ICD-10-CM

## 2024-11-19 LAB
ALBUMIN SERPL-MCNC: 4.4 G/DL (ref 3.4–5)
ALBUMIN/GLOB SERPL: 1.8 {RATIO} (ref 1.1–2.2)
ALP SERPL-CCNC: 66 U/L (ref 40–129)
ALT SERPL-CCNC: 8 U/L (ref 10–40)
ANION GAP SERPL CALCULATED.3IONS-SCNC: 11 MMOL/L (ref 3–16)
AST SERPL-CCNC: 24 U/L (ref 15–37)
BASOPHILS # BLD: 0.1 K/UL (ref 0–0.2)
BASOPHILS NFR BLD: 0.9 %
BILIRUB SERPL-MCNC: 0.3 MG/DL (ref 0–1)
BUN SERPL-MCNC: 31 MG/DL (ref 7–20)
CALCIUM SERPL-MCNC: 9.5 MG/DL (ref 8.3–10.6)
CHLORIDE SERPL-SCNC: 102 MMOL/L (ref 99–110)
CO2 SERPL-SCNC: 25 MMOL/L (ref 21–32)
CREAT SERPL-MCNC: 1.4 MG/DL (ref 0.6–1.2)
DEPRECATED RDW RBC AUTO: 14.4 % (ref 12.4–15.4)
EOSINOPHIL # BLD: 0.1 K/UL (ref 0–0.6)
EOSINOPHIL NFR BLD: 1.6 %
GFR SERPLBLD CREATININE-BSD FMLA CKD-EPI: 36 ML/MIN/{1.73_M2}
GLUCOSE SERPL-MCNC: 199 MG/DL (ref 70–99)
HCT VFR BLD AUTO: 37.6 % (ref 36–48)
HGB BLD-MCNC: 12.3 G/DL (ref 12–16)
LIPASE SERPL-CCNC: 26 U/L (ref 13–60)
LYMPHOCYTES # BLD: 2 K/UL (ref 1–5.1)
LYMPHOCYTES NFR BLD: 29.6 %
MCH RBC QN AUTO: 28.8 PG (ref 26–34)
MCHC RBC AUTO-ENTMCNC: 32.6 G/DL (ref 31–36)
MCV RBC AUTO: 88.3 FL (ref 80–100)
MONOCYTES # BLD: 0.6 K/UL (ref 0–1.3)
MONOCYTES NFR BLD: 8.7 %
NEUTROPHILS # BLD: 4 K/UL (ref 1.7–7.7)
NEUTROPHILS NFR BLD: 59.2 %
PLATELET # BLD AUTO: NORMAL K/UL (ref 135–450)
PLATELET BLD QL SMEAR: NORMAL
PMV BLD AUTO: NORMAL FL (ref 5–10.5)
POTASSIUM SERPL-SCNC: 4.1 MMOL/L (ref 3.5–5.1)
PROT SERPL-MCNC: 6.9 G/DL (ref 6.4–8.2)
RBC # BLD AUTO: 4.26 M/UL (ref 4–5.2)
SLIDE REVIEW: NORMAL
SODIUM SERPL-SCNC: 138 MMOL/L (ref 136–145)
WBC # BLD AUTO: 6.7 K/UL (ref 4–11)

## 2024-11-19 PROCEDURE — 99284 EMERGENCY DEPT VISIT MOD MDM: CPT

## 2024-11-19 PROCEDURE — 83690 ASSAY OF LIPASE: CPT

## 2024-11-19 PROCEDURE — 80053 COMPREHEN METABOLIC PANEL: CPT

## 2024-11-19 PROCEDURE — 85025 COMPLETE CBC W/AUTO DIFF WBC: CPT

## 2024-11-19 PROCEDURE — 70450 CT HEAD/BRAIN W/O DYE: CPT

## 2024-11-19 ASSESSMENT — PAIN DESCRIPTION - LOCATION: LOCATION: ABDOMEN

## 2024-11-19 ASSESSMENT — PAIN DESCRIPTION - DESCRIPTORS: DESCRIPTORS: ACHING

## 2024-11-19 ASSESSMENT — PAIN - FUNCTIONAL ASSESSMENT: PAIN_FUNCTIONAL_ASSESSMENT: 0-10

## 2024-11-19 ASSESSMENT — PAIN DESCRIPTION - ORIENTATION: ORIENTATION: LOWER

## 2024-11-19 ASSESSMENT — PAIN SCALES - GENERAL: PAINLEVEL_OUTOF10: 3

## 2024-11-19 NOTE — ED PROVIDER NOTES
unfortunately sustained mild superficial skin tears to her bilateral arms while trying to obtain the sample.  Patient reports no dysuria, is at her baseline level of mentation, with nontender abdomen, no leukocytosis.  Overall, the utility of urinalysis in the setting is low and I do not believe that sedating the patient for the sample would provide much utility.  This was discussed with the patient's son and he is in agreement.    Given reassuring abdominal examination, no ongoing diarrhea with questionable history of diarrhea, and reassuring labs, the patient is appropriate for discharge at this time.  Will arrange transport back to her nursing facility at this time.    Medical Decision Making  Amount and/or Complexity of Data Reviewed  Labs: ordered.  Radiology: ordered.        Is this patient to be included in the SEP-1 core measure? No Exclusion criteria - the patient is NOT to be included for SEP-1 Core Measure due to: Infection is not suspected    Clinical Impression     1. Closed head injury, initial encounter    2. Diarrhea, unspecified type        Disposition     PATIENT REFERRED TO:  Odalys Dale MD  [address] [place] [address]  [phone]    In 1 week  If symptoms worsen      DISCHARGE MEDICATIONS:  New Prescriptions    No medications on file       DISPOSITION Decision To Discharge 11/19/2024 09:35:06 PM             Diagnostic Results and Other Data       RADIOLOGY:  CT HEAD WO CONTRAST   Final Result      1.  No acute intracranial process.   2.  Moderate cerebral atrophy and chronic small vessel ischemic disease.      Electronically signed by Mihir Jarrett          LABS:   Results for orders placed or performed during the hospital encounter of 11/19/24   CBC with Auto Differential   Result Value Ref Range    WBC 6.7 4.0 - 11.0 K/uL    RBC 4.26 4.00 - 5.20 M/uL    Hemoglobin 12.3 12.0 - 16.0 g/dL    Hematocrit 37.6 36.0 - 48.0 %    MCV 88.3 80.0 - 100.0 fL    MCH 28.8 26.0 - 34.0

## 2024-11-19 NOTE — ED TRIAGE NOTES
Pt comes from nursing facility co abd pain and having diarrhea. Pt arrived at the facility today. Per EMS pt may have had an unwitnessed fall but upon arrival she was covered in stool and cleaned up by staff prior to arrival. Pt has severe dementia. Pt alert and oriented to self, place, and situation. Pt unsure of , current date.

## 2024-11-20 NOTE — DISCHARGE INSTRUCTIONS
We saw you in the hospital for a fall. Your head CT was reassuring. The rest of your examination and laboratory workup did not show any abnormalities that require additional workup.     You need to see your regular doctor in 7 days to be checked. Please contact your regular doctor and schedule an appointment.    You should return to the emergency department if your symptoms worsen or do not resolve. In addition, return if:  - You have a fever (greater than 101 degrees)  - You have chest pain, shortness of breath, abdominal pain, or uncontrollable vomiting  - You are unable to eat or drink  - You pass out  - You have difficulty moving your arms or legs   - You have difficulty speaking or slurred speech  - Or you have any concern that you feel needs immediate physician evaluation.

## 2024-11-20 NOTE — ED NOTES
Patient complaining of R ankle pain, describing pain at cramping. Son at bedside confirming pt at unwitnessed fall around 1830 this pm. MD notified.     Rob Garrido RN  11/19/24 2007

## 2025-02-16 SDOH — ECONOMIC STABILITY: INCOME INSECURITY: IN THE LAST 12 MONTHS, WAS THERE A TIME WHEN YOU WERE NOT ABLE TO PAY THE MORTGAGE OR RENT ON TIME?: NO

## 2025-02-16 SDOH — ECONOMIC STABILITY: FOOD INSECURITY: WITHIN THE PAST 12 MONTHS, THE FOOD YOU BOUGHT JUST DIDN'T LAST AND YOU DIDN'T HAVE MONEY TO GET MORE.: NEVER TRUE

## 2025-02-16 SDOH — ECONOMIC STABILITY: FOOD INSECURITY: WITHIN THE PAST 12 MONTHS, YOU WORRIED THAT YOUR FOOD WOULD RUN OUT BEFORE YOU GOT MONEY TO BUY MORE.: NEVER TRUE

## 2025-02-16 SDOH — ECONOMIC STABILITY: TRANSPORTATION INSECURITY
IN THE PAST 12 MONTHS, HAS THE LACK OF TRANSPORTATION KEPT YOU FROM MEDICAL APPOINTMENTS OR FROM GETTING MEDICATIONS?: NO

## 2025-02-18 ASSESSMENT — PATIENT HEALTH QUESTIONNAIRE - PHQ9
SUM OF ALL RESPONSES TO PHQ QUESTIONS 1-9: 0
SUM OF ALL RESPONSES TO PHQ9 QUESTIONS 1 & 2: 0
SUM OF ALL RESPONSES TO PHQ QUESTIONS 1-9: 0
2. FEELING DOWN, DEPRESSED OR HOPELESS: NOT AT ALL
1. LITTLE INTEREST OR PLEASURE IN DOING THINGS: NOT AT ALL
1. LITTLE INTEREST OR PLEASURE IN DOING THINGS: NOT AT ALL
SUM OF ALL RESPONSES TO PHQ9 QUESTIONS 1 & 2: 0
SUM OF ALL RESPONSES TO PHQ QUESTIONS 1-9: 0
2. FEELING DOWN, DEPRESSED OR HOPELESS: NOT AT ALL
SUM OF ALL RESPONSES TO PHQ QUESTIONS 1-9: 0

## 2025-02-19 ENCOUNTER — OFFICE VISIT (OUTPATIENT)
Dept: INTERNAL MEDICINE CLINIC | Age: 89
End: 2025-02-19

## 2025-02-19 VITALS
WEIGHT: 127 LBS | BODY MASS INDEX: 20.5 KG/M2 | DIASTOLIC BLOOD PRESSURE: 80 MMHG | OXYGEN SATURATION: 98 % | HEART RATE: 74 BPM | SYSTOLIC BLOOD PRESSURE: 130 MMHG

## 2025-02-19 DIAGNOSIS — Z00.00 ENCOUNTER FOR SUBSEQUENT ANNUAL WELLNESS VISIT (AWV) IN MEDICARE PATIENT: Primary | ICD-10-CM

## 2025-02-19 DIAGNOSIS — H61.21 RIGHT EAR IMPACTED CERUMEN: ICD-10-CM

## 2025-02-19 DIAGNOSIS — Z00.00 MEDICARE ANNUAL WELLNESS VISIT, SUBSEQUENT: ICD-10-CM

## 2025-02-19 DIAGNOSIS — E03.9 HYPOTHYROIDISM, UNSPECIFIED TYPE: ICD-10-CM

## 2025-02-19 DIAGNOSIS — G30.1 LATE ONSET ALZHEIMER'S DEMENTIA WITHOUT BEHAVIORAL DISTURBANCE (HCC): ICD-10-CM

## 2025-02-19 DIAGNOSIS — J20.9 SUBACUTE BRONCHITIS: ICD-10-CM

## 2025-02-19 DIAGNOSIS — I13.0 HYPERTENSIVE HEART AND CHRONIC KIDNEY DISEASE WITH HEART FAILURE AND STAGE 1 THROUGH STAGE 4 CHRONIC KIDNEY DISEASE, OR UNSPECIFIED CHRONIC KIDNEY DISEASE (HCC): ICD-10-CM

## 2025-02-19 DIAGNOSIS — F02.80 LATE ONSET ALZHEIMER'S DEMENTIA WITHOUT BEHAVIORAL DISTURBANCE (HCC): ICD-10-CM

## 2025-02-19 DIAGNOSIS — G47.01 INSOMNIA DUE TO MEDICAL CONDITION: ICD-10-CM

## 2025-02-19 DIAGNOSIS — Z91.81 AT HIGH RISK FOR FALLS: ICD-10-CM

## 2025-02-19 DIAGNOSIS — N18.30 STAGE 3 CHRONIC KIDNEY DISEASE, UNSPECIFIED WHETHER STAGE 3A OR 3B CKD (HCC): ICD-10-CM

## 2025-02-19 PROBLEM — S06.361A: Status: ACTIVE | Noted: 2025-02-19

## 2025-02-19 RX ORDER — TRAZODONE HYDROCHLORIDE 50 MG/1
50 TABLET ORAL NIGHTLY
COMMUNITY
Start: 2025-01-27

## 2025-02-19 RX ORDER — MIRTAZAPINE 7.5 MG/1
7.5 TABLET, FILM COATED ORAL NIGHTLY
COMMUNITY

## 2025-02-19 RX ORDER — DOXYCYCLINE HYCLATE 100 MG
100 TABLET ORAL 2 TIMES DAILY
Qty: 14 TABLET | Refills: 0 | Status: SHIPPED | OUTPATIENT
Start: 2025-02-19 | End: 2025-02-26

## 2025-02-19 SDOH — ECONOMIC STABILITY: FOOD INSECURITY: WITHIN THE PAST 12 MONTHS, YOU WORRIED THAT YOUR FOOD WOULD RUN OUT BEFORE YOU GOT MONEY TO BUY MORE.: NEVER TRUE

## 2025-02-19 SDOH — ECONOMIC STABILITY: FOOD INSECURITY: WITHIN THE PAST 12 MONTHS, THE FOOD YOU BOUGHT JUST DIDN'T LAST AND YOU DIDN'T HAVE MONEY TO GET MORE.: NEVER TRUE

## 2025-02-19 NOTE — PROGRESS NOTES
Medicare Annual Wellness Visit    Doreen Gilbert is here for 6 Month Follow-Up and Medicare AWV (Subsequent /)    Assessment & Plan   Encounter for subsequent annual wellness visit (AWV) in Medicare patient  Right ear impacted cerumen  -     [facility] - Bertin Odonnell DO, Otolaryngology, [facility]  At high risk for falls  Hypertensive heart and chronic kidney disease with heart failure and stage 1 through stage 4 chronic kidney disease, or unspecified chronic kidney disease (HCC)  Well-controlled on amlodipine and losartan  Late onset Alzheimer's dementia without behavioral disturbance (HCC)  Declines Aricept as it seemed ineffective  Stage 3 chronic kidney disease, unspecified whether stage 3a or 3b CKD (HCC)  stable  Hypothyroidism, unspecified type  Stable on levothyroxine  Subacute bronchitis  -     doxycycline hyclate (VIBRA-TABS) 100 MG tablet; Take 1 tablet by mouth 2 times daily for 7 days, Disp-14 tablet, R-0Normal  Insomnia due to medical condition  Care facility is giving her combination of melatonin and mirtazapine and trazodone which is effective        Return in about 6 months (around 8/19/2025).     Subjective       Patient is here with her son.  Several months ago she moved to [facility] for memory care.  He states that she seems to be adjusting well.  He visits her every day.  They have both had a cold over the past few weeks.  Her seems to be lingering in the phlegm has turned from thin to thick.  She has been coughing.  Patient denies any complaints but she is not a good historian.    Patient's complete Health Risk Assessment and screening values have been reviewed and are found in Flowsheets. The following problems were reviewed today and where indicated follow up appointments were made and/or referrals ordered.    Positive Risk Factor Screenings with Interventions:    Fall Risk:  Do you feel unsteady or are you worried about falling? : (!) yes  2 or more falls in past year?: (!) yes  Fall

## 2025-04-17 ENCOUNTER — TELEPHONE (OUTPATIENT)
Dept: INTERNAL MEDICINE CLINIC | Age: 89
End: 2025-04-17

## 2025-04-17 NOTE — TELEPHONE ENCOUNTER
I assume this means they are seeing a healthcare provider at the nursing home and no longer needs an outpatient PCP

## 2025-04-17 NOTE — TELEPHONE ENCOUNTER
TEE     Called and spoke to patients gary mann in regards to patient's upcoming appt in August and he stated patient is in a nursing home indefinitely at [facility]

## 2025-05-16 ENCOUNTER — OFFICE VISIT (OUTPATIENT)
Dept: ENT CLINIC | Age: 89
End: 2025-05-16

## 2025-05-16 VITALS
RESPIRATION RATE: 16 BRPM | BODY MASS INDEX: 23.37 KG/M2 | TEMPERATURE: 97.1 F | HEART RATE: 70 BPM | HEIGHT: 62 IN | SYSTOLIC BLOOD PRESSURE: 202 MMHG | WEIGHT: 127 LBS | DIASTOLIC BLOOD PRESSURE: 96 MMHG

## 2025-05-16 DIAGNOSIS — H61.21 IMPACTED CERUMEN OF RIGHT EAR: Primary | ICD-10-CM

## 2025-05-16 DIAGNOSIS — I16.0 HYPERTENSIVE URGENCY: ICD-10-CM

## 2025-05-16 ASSESSMENT — ENCOUNTER SYMPTOMS
SINUS PAIN: 0
CONSTIPATION: 0
COUGH: 0
EYE ITCHING: 0
STRIDOR: 0
BACK PAIN: 0
PHOTOPHOBIA: 0
EYE DISCHARGE: 0
SINUS PRESSURE: 0
BLOOD IN STOOL: 0
SHORTNESS OF BREATH: 0
TROUBLE SWALLOWING: 0
RHINORRHEA: 0
CHOKING: 0
SORE THROAT: 0
DIARRHEA: 0
NAUSEA: 0
COLOR CHANGE: 0
WHEEZING: 0
FACIAL SWELLING: 0
VOICE CHANGE: 0
VOMITING: 0

## 2025-05-16 NOTE — PROGRESS NOTES
submandibular, tonsillar, preauricular, posterior auricular or occipital adenopathy.      Left side of head: No submental, submandibular, tonsillar, preauricular, posterior auricular or occipital adenopathy.      Cervical: No cervical adenopathy.      Right cervical: No superficial, deep or posterior cervical adenopathy.     Left cervical: No superficial, deep or posterior cervical adenopathy.   Skin:     General: Skin is warm and dry.      Findings: No bruising, erythema, laceration, lesion or rash.   Neurological:      Mental Status: She is alert and oriented to person, place, and time.      Cranial Nerves: No cranial nerve deficit.   Psychiatric:         Speech: Speech normal.         Behavior: Behavior normal.           Procedure     Removal Impacted Cerumen    An operating microscope was utilized to visualize the external auditory canals using a 4mm speculum.  Cerumen was removed with curettes and chan suctions on the right side.  The tympanic membrane is intact.  No fluid visualized in the middle ear. No complications.        Assessment and Plan     1. Impacted cerumen of right ear  Right side impacted cerumen removed with instruments.    2. Hypertensive urgency  Patient's blood pressure significantly elevated this morning.  Asymptomatic.  Discussed risks of elevated blood pressure with son.  They are going to go back to the facility, administer her blood pressure medication.  If her blood pressure still high I saw the son to take her to the emergency department.      Return if symptoms worsen or fail to improve.      [ ] Review/order radiology tests   [ ] Independent interpretation of diagnostic test by another provider  [ ] Discussed case with another provider  [ ] High risk of loss of major body function  [ ] Elective major surgery with risk factors    Portions of this note were dictated using Dragon. There may be linguistic errors secondary to the use of this program.

## 2025-07-13 ENCOUNTER — APPOINTMENT (OUTPATIENT)
Dept: CT IMAGING | Age: 89
DRG: 083 | End: 2025-07-13
Payer: MEDICARE

## 2025-07-13 ENCOUNTER — HOSPITAL ENCOUNTER (INPATIENT)
Age: 89
LOS: 2 days | Discharge: NURSING, REHAB OR LONG-TERM CARE | DRG: 083 | End: 2025-07-15
Admitting: INTERNAL MEDICINE
Payer: MEDICARE

## 2025-07-13 ENCOUNTER — APPOINTMENT (OUTPATIENT)
Dept: GENERAL RADIOLOGY | Age: 89
DRG: 083 | End: 2025-07-13
Payer: MEDICARE

## 2025-07-13 DIAGNOSIS — S09.90XA CLOSED HEAD INJURY, INITIAL ENCOUNTER: Primary | ICD-10-CM

## 2025-07-13 LAB
ANION GAP SERPL CALCULATED.3IONS-SCNC: 9 MMOL/L (ref 3–16)
APTT BLD: 34 SEC (ref 22.8–35.8)
BASOPHILS # BLD: 0 K/UL (ref 0–0.2)
BASOPHILS NFR BLD: 0.5 %
BILIRUB UR QL STRIP.AUTO: NEGATIVE
BUN SERPL-MCNC: 20 MG/DL (ref 7–20)
CALCIUM SERPL-MCNC: 9.5 MG/DL (ref 8.3–10.6)
CHLORIDE SERPL-SCNC: 104 MMOL/L (ref 99–110)
CLARITY UR: CLEAR
CO2 SERPL-SCNC: 27 MMOL/L (ref 21–32)
COLOR UR: YELLOW
CREAT SERPL-MCNC: 1.1 MG/DL (ref 0.6–1.2)
DEPRECATED RDW RBC AUTO: 14.8 % (ref 12.4–15.4)
EOSINOPHIL # BLD: 0.1 K/UL (ref 0–0.6)
EOSINOPHIL NFR BLD: 1.6 %
GFR SERPLBLD CREATININE-BSD FMLA CKD-EPI: 48 ML/MIN/{1.73_M2}
GLUCOSE SERPL-MCNC: 110 MG/DL (ref 70–99)
GLUCOSE UR STRIP.AUTO-MCNC: NEGATIVE MG/DL
HCT VFR BLD AUTO: 36.9 % (ref 36–48)
HGB BLD-MCNC: 12.4 G/DL (ref 12–16)
HGB UR QL STRIP.AUTO: NEGATIVE
INR PPP: 0.98 (ref 0.86–1.14)
KETONES UR STRIP.AUTO-MCNC: NEGATIVE MG/DL
LEUKOCYTE ESTERASE UR QL STRIP.AUTO: NEGATIVE
LYMPHOCYTES # BLD: 1.7 K/UL (ref 1–5.1)
LYMPHOCYTES NFR BLD: 20.8 %
MCH RBC QN AUTO: 28.2 PG (ref 26–34)
MCHC RBC AUTO-ENTMCNC: 33.5 G/DL (ref 31–36)
MCV RBC AUTO: 84.3 FL (ref 80–100)
MONOCYTES # BLD: 0.8 K/UL (ref 0–1.3)
MONOCYTES NFR BLD: 9.9 %
NEUTROPHILS # BLD: 5.5 K/UL (ref 1.7–7.7)
NEUTROPHILS NFR BLD: 67.2 %
NITRITE UR QL STRIP.AUTO: NEGATIVE
PH UR STRIP.AUTO: 8 [PH] (ref 5–8)
PLATELET # BLD AUTO: 131 K/UL (ref 135–450)
PMV BLD AUTO: 9 FL (ref 5–10.5)
POTASSIUM SERPL-SCNC: 4.7 MMOL/L (ref 3.5–5.1)
PROT UR STRIP.AUTO-MCNC: NEGATIVE MG/DL
PROTHROMBIN TIME: 13.3 SEC (ref 12.1–14.9)
RBC # BLD AUTO: 4.38 M/UL (ref 4–5.2)
SODIUM SERPL-SCNC: 140 MMOL/L (ref 136–145)
SP GR UR STRIP.AUTO: 1.02 (ref 1–1.03)
T4 FREE SERPL-MCNC: 1.1 NG/DL (ref 0.9–1.8)
TSH SERPL DL<=0.005 MIU/L-ACNC: 4.24 UIU/ML (ref 0.27–4.2)
UA COMPLETE W REFLEX CULTURE PNL UR: NORMAL
UA DIPSTICK W REFLEX MICRO PNL UR: NORMAL
URN SPEC COLLECT METH UR: NORMAL
UROBILINOGEN UR STRIP-ACNC: 0.2 E.U./DL
WBC # BLD AUTO: 8.2 K/UL (ref 4–11)

## 2025-07-13 PROCEDURE — 99285 EMERGENCY DEPT VISIT HI MDM: CPT

## 2025-07-13 PROCEDURE — 80048 BASIC METABOLIC PNL TOTAL CA: CPT

## 2025-07-13 PROCEDURE — 72125 CT NECK SPINE W/O DYE: CPT

## 2025-07-13 PROCEDURE — 1200000000 HC SEMI PRIVATE

## 2025-07-13 PROCEDURE — 6370000000 HC RX 637 (ALT 250 FOR IP)

## 2025-07-13 PROCEDURE — 85610 PROTHROMBIN TIME: CPT

## 2025-07-13 PROCEDURE — 84439 ASSAY OF FREE THYROXINE: CPT

## 2025-07-13 PROCEDURE — 36415 COLL VENOUS BLD VENIPUNCTURE: CPT

## 2025-07-13 PROCEDURE — 6360000002 HC RX W HCPCS

## 2025-07-13 PROCEDURE — 2500000003 HC RX 250 WO HCPCS

## 2025-07-13 PROCEDURE — 85730 THROMBOPLASTIN TIME PARTIAL: CPT

## 2025-07-13 PROCEDURE — 70450 CT HEAD/BRAIN W/O DYE: CPT

## 2025-07-13 PROCEDURE — 92610 EVALUATE SWALLOWING FUNCTION: CPT

## 2025-07-13 PROCEDURE — 85025 COMPLETE CBC W/AUTO DIFF WBC: CPT

## 2025-07-13 PROCEDURE — 71045 X-RAY EXAM CHEST 1 VIEW: CPT

## 2025-07-13 PROCEDURE — 84443 ASSAY THYROID STIM HORMONE: CPT

## 2025-07-13 PROCEDURE — 81003 URINALYSIS AUTO W/O SCOPE: CPT

## 2025-07-13 RX ORDER — AMLODIPINE BESYLATE 10 MG/1
10 TABLET ORAL DAILY
Status: DISCONTINUED | OUTPATIENT
Start: 2025-07-13 | End: 2025-07-15 | Stop reason: HOSPADM

## 2025-07-13 RX ORDER — ACETAMINOPHEN 650 MG/1
650 SUPPOSITORY RECTAL EVERY 6 HOURS PRN
Status: DISCONTINUED | OUTPATIENT
Start: 2025-07-13 | End: 2025-07-15 | Stop reason: HOSPADM

## 2025-07-13 RX ORDER — MIRTAZAPINE 15 MG/1
7.5 TABLET, FILM COATED ORAL NIGHTLY
Status: DISCONTINUED | OUTPATIENT
Start: 2025-07-13 | End: 2025-07-15 | Stop reason: HOSPADM

## 2025-07-13 RX ORDER — ACETAMINOPHEN 325 MG/1
650 TABLET ORAL EVERY 6 HOURS PRN
Status: DISCONTINUED | OUTPATIENT
Start: 2025-07-13 | End: 2025-07-15 | Stop reason: HOSPADM

## 2025-07-13 RX ORDER — LEVOTHYROXINE SODIUM 50 UG/1
50 TABLET ORAL DAILY
Status: DISCONTINUED | OUTPATIENT
Start: 2025-07-13 | End: 2025-07-15 | Stop reason: HOSPADM

## 2025-07-13 RX ORDER — ONDANSETRON 2 MG/ML
4 INJECTION INTRAMUSCULAR; INTRAVENOUS EVERY 6 HOURS PRN
Status: DISCONTINUED | OUTPATIENT
Start: 2025-07-13 | End: 2025-07-15 | Stop reason: HOSPADM

## 2025-07-13 RX ORDER — ONDANSETRON 4 MG/1
4 TABLET, ORALLY DISINTEGRATING ORAL EVERY 8 HOURS PRN
Status: DISCONTINUED | OUTPATIENT
Start: 2025-07-13 | End: 2025-07-15 | Stop reason: HOSPADM

## 2025-07-13 RX ORDER — SODIUM CHLORIDE 0.9 % (FLUSH) 0.9 %
5-40 SYRINGE (ML) INJECTION PRN
Status: DISCONTINUED | OUTPATIENT
Start: 2025-07-13 | End: 2025-07-15 | Stop reason: HOSPADM

## 2025-07-13 RX ORDER — SODIUM CHLORIDE 9 MG/ML
INJECTION, SOLUTION INTRAVENOUS PRN
Status: DISCONTINUED | OUTPATIENT
Start: 2025-07-13 | End: 2025-07-15 | Stop reason: HOSPADM

## 2025-07-13 RX ORDER — OLANZAPINE 5 MG/1
5 TABLET, FILM COATED ORAL ONCE
Status: COMPLETED | OUTPATIENT
Start: 2025-07-13 | End: 2025-07-13

## 2025-07-13 RX ORDER — LOSARTAN POTASSIUM 25 MG/1
100 TABLET ORAL DAILY
Status: DISCONTINUED | OUTPATIENT
Start: 2025-07-13 | End: 2025-07-15 | Stop reason: HOSPADM

## 2025-07-13 RX ORDER — TRAZODONE HYDROCHLORIDE 50 MG/1
50 TABLET ORAL
Status: DISCONTINUED | OUTPATIENT
Start: 2025-07-13 | End: 2025-07-15 | Stop reason: HOSPADM

## 2025-07-13 RX ORDER — IBUPROFEN 400 MG/1
400 TABLET, FILM COATED ORAL EVERY 8 HOURS PRN
Status: ON HOLD | COMMUNITY
End: 2025-07-15 | Stop reason: HOSPADM

## 2025-07-13 RX ORDER — LEVETIRACETAM 500 MG/5ML
1250 INJECTION, SOLUTION, CONCENTRATE INTRAVENOUS ONCE
Status: COMPLETED | OUTPATIENT
Start: 2025-07-13 | End: 2025-07-13

## 2025-07-13 RX ORDER — LEVETIRACETAM 500 MG/5ML
500 INJECTION, SOLUTION, CONCENTRATE INTRAVENOUS EVERY 12 HOURS
Status: DISCONTINUED | OUTPATIENT
Start: 2025-07-13 | End: 2025-07-14

## 2025-07-13 RX ORDER — ACETAMINOPHEN 500 MG
1000 TABLET ORAL 2 TIMES DAILY
COMMUNITY

## 2025-07-13 RX ORDER — CLONIDINE 0.1 MG/24H
1 PATCH, EXTENDED RELEASE TRANSDERMAL WEEKLY
COMMUNITY

## 2025-07-13 RX ORDER — SODIUM CHLORIDE 0.9 % (FLUSH) 0.9 %
5-40 SYRINGE (ML) INJECTION EVERY 12 HOURS SCHEDULED
Status: DISCONTINUED | OUTPATIENT
Start: 2025-07-13 | End: 2025-07-15 | Stop reason: HOSPADM

## 2025-07-13 RX ADMIN — Medication 10 ML: at 23:00

## 2025-07-13 RX ADMIN — OLANZAPINE 5 MG: 5 TABLET, FILM COATED ORAL at 15:42

## 2025-07-13 RX ADMIN — MIRTAZAPINE 7.5 MG: 15 TABLET, FILM COATED ORAL at 20:15

## 2025-07-13 RX ADMIN — LEVETIRACETAM 500 MG: 100 INJECTION INTRAVENOUS at 22:32

## 2025-07-13 RX ADMIN — LEVETIRACETAM 1250 MG: 100 INJECTION INTRAVENOUS at 12:33

## 2025-07-13 ASSESSMENT — PAIN SCALES - GENERAL
PAINLEVEL_OUTOF10: 1
PAINLEVEL_OUTOF10: 0

## 2025-07-13 ASSESSMENT — PAIN - FUNCTIONAL ASSESSMENT: PAIN_FUNCTIONAL_ASSESSMENT: NONE - DENIES PAIN

## 2025-07-13 NOTE — ED NOTES
Pt very combative when trying to get EKG screaming biting hitting kicking son at bedside trying to calm her with no success so he said forget it don't do it Kellee Liao, RN  07/13/25 5147

## 2025-07-13 NOTE — H&P
Internal Medicine H&P    Date:   2025   Patient:  Doreen Gilbert   :   1935     CC:  Fall (Sent from SNF fro unwitnessed fall. Was found on floor with abrasion noted above left eye. Staff also sts that pt has become combative with care over the last 2 weeks. Is pt of [facility]/ [facility])       Source of HPI: Her son (POA), SN (Memory Care) phone call, and chart review.     Subjective   HPI:   Ms. Doreen Gilbert is a 89 y.o. female with a MHx significant for, multiple prior falls, stroke, advanced Alzheimer dementia, CKD-3, hypothyroidism, HTN (w/ episode of hypertensive emergency) who presented to the ED in the morning of  with  un-witnessed fall at her nursing facility.     ED Course:  Oriented to person only; combative  Vitals: BP of 191/83, HR of 68, Temp of 97.6, SpO2 of 98  Physical Exam: Oriented to self only; Large hematoma at the left forehead with small overlying abrasion   EKG: unable to to combativeness  Labs: Platelets 131, PT 13.3, aPTT 34; Glu 110, GFR 48, Cr 1.1  Imaging: Brain CT w/o contrast: intraparenchymal hemorrhage   Treatment: fluids, Keppra    The ED team consulted the neuro team and performed a head/spine CT w/o contrast... which showed R posterior parietal & R occipital parenchyma hemorrhage with edema w/o midline shift.  Per neuro pt is not a good surgical candidate due to complicities & age    Ms. Gilbert was admitted to the Internal Medicine wards for the evaluation and follow-up of her intracranial bleed. During our interview her son was by the bedside and provided most of the history; the pt reported mild headache, was oriented only to person, and was not able to provide any other information.  We also called the [facility] nursing facility who mentioned that the pt was in her usual state of health last night at dinner; The LKW time was provided by the son who visited her around 7pm. She was seen asleep this morning at 6:00 and then found sitting on the

## 2025-07-13 NOTE — PROGRESS NOTES
4 Eyes Skin Assessment     NAME:  Doreen Gilbert  YOB: 1935  MEDICAL RECORD NUMBER:  [id number]    The patient is being assessed for  Admission    I agree that at least one RN has performed a thorough Head to Toe Skin Assessment on the patient. ALL assessment sites listed below have been assessed.      Areas assessed by both nurses:    Head, Face, Ears, Shoulders, Back, Chest, Arms, Elbows, Hands, Sacrum. Buttock, Coccyx, Ischium, Legs. Feet and Heels, and Under Medical Devices     Skin tears x2 L forearm   Skin tear x1 R forearm   Blanchable redness to sacrum   Scattered bruising   Abrasion & bruising to L eye        Does the Patient have a Wound? No noted wound(s)       Melchor Prevention initiated by RN: No  Wound Care Orders initiated by RN: No    Pressure Injury (Stage 1,2,3,4, Unstageable, DTI, NWPT, and Complex wounds) if present, place Wound referral order by RN under : No    New Ostomies, if present place, Ostomy referral order under : No     Nurse 1 eSignature: Electronically signed by Cortney Ragland RN on 7/13/25 at 12:28 PM EDT    **SHARE this note so that the co-signing nurse can place an eSignature**    Nurse 2 eSignature: {Esignature:[id number]}

## 2025-07-13 NOTE — PROGRESS NOTES
Pt admitted to room 5521. 4 eyes completed with 2nd RN.     Pt alert to self only. BP elevated this shift. Pharmacy to verify home meds. Pt's memory care unit called to have med list faxed. Pharmacy aware of pt's faxed med list. Turning and pillow support utilized. Voiding via purewick. No s/s of pain thus far. Tolerating PO diet. Denies needs at this time. Fall precautions in place, call light within reach.

## 2025-07-13 NOTE — PROGRESS NOTES
Clinical Pharmacy Consult Note  Medication History     Admit Date: 7/13/2025    List of current medications patient is taking is complete. Home Medication list in Epic updated to reflect changes noted below.    Source of information: Facility transfer papers (Elmer Hull)    Patient's home pharmacy:   [id number] - [place], [place] - [address] [place] [address] - P [phone] - F [phone]  [address] [place] [address]  [place] [address]  Phone: [phone] Fax: [phone]      Changes made to medication list:     Medications removed:   None    Medications added:   Clonidine 0.1 mg/24 hr patch   Ibuprofen 400 mg tablet     Medication doses adjusted per facility med list:   Acetaminophen 500 mg tablet - dose changed from 1 tablet twice daily to 2 tablets twice daily   Melatonin 3 mg tablet - dose frequency changed from nightly as needed to nightly   Trazodone 50 mg tablet - dose frequency changed from nightly to nightly as needed     Other notes:   Med rec completed using facility transfer papers, all last doses approximate   Patient has no known allergies per facility   Losartan 100 mg tablet - appears on facility papers twice, once marked as being on hold without a start or end date, once not marked as hold, unclear if the medication is currently on hold or not, if the patient is currently taking the last dose would likely have been yesterday 07/12/2025    Current Outpatient Medications   Medication Instructions    acetaminophen (TYLENOL) 1,000 mg, 2 TIMES DAILY    amLODIPine (NORVASC) 10 mg, Oral, DAILY    cloNIDine (CATAPRES) 0.1 MG/24HR PTWK 1 patch, WEEKLY    ibuprofen (ADVIL;MOTRIN) 400 mg, EVERY 8 HOURS PRN    levothyroxine (SYNTHROID) 50 mcg, Oral, DAILY    losartan (COZAAR) 100 MG tablet TAKE 1 TABLET BY MOUTH EVERY DAY    melatonin 3 mg, NIGHTLY    mirtazapine (REMERON) 7.5 mg, NIGHTLY    traZODone (DESYREL) 50 mg, NIGHTLY PRN       Thank you for consulting pharmacy,    Марина Noguera, PharmD Candidate 2027

## 2025-07-13 NOTE — ED PROVIDER NOTES
THE [facility]  EMERGENCY DEPARTMENT ENCOUNTER          ATTENDING PHYSICIAN NOTE       Date of evaluation: 7/13/2025    Chief Complaint     Fall (Sent from SNF fro unwitnessed fall. Was found on floor with abrasion noted above left eye. Staff also sts that pt has become combative with care over the last 2 weeks. Is pt of [facility])      History of Present Illness     Doreen Gilbert is a 89 y.o. female who presents for ground-level fall.  Presenting from [facility] nursing memory care facility for an unwitnessed fall this morning.  EMS reports patient is at her baseline intermittently combative state.  Denies blood thinner use.  Reports patient has a DNR.  Limited information available to initial evaluation given patient disoriented at baseline.    ASSESSMENT / PLAN  (MEDICAL DECISION MAKING)     INITIAL VITALS: BP: (!) 191/83, Temp: 97.6 °F (36.4 °C), Pulse: 68, Respirations: 20, SpO2: 97 %      Doreen Gilbert is a 89 y.o. female ground-level fall.  MDM per ED course  ED Course as of 07/13/25 1144   Sun Jul 13, 2025   1140 89-year-old female presenting for unwitnessed fall.  ABCs intact, hemodynamically stable on initial evaluation.  Secondary evaluation reveals large hematoma to the left forehead with overlying abrasion [MS]   1143 .  Oriented to self only.  Apparently this is baseline per EMS report and review of the documentation.  She was taken to CT scanner for further evaluation and found to have intraparenchymal hemorrhage.  Laboratory evaluation otherwise reassuring and unremarkable.  Does not appear the patient is on anticoagulants and this is corroborated by family.  Discussed case with neurosurgery and she would not be a surgical candidate given multiple factors including comorbidities and age.  Following this discussion she was admitted to medicine for further evaluation and treatment pending repeat CT head recommended by neurosurgery.  Family at bedside is agreeable with this plan and
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
## 2025-07-13 NOTE — PLAN OF CARE
Problem: Safety - Adult  Goal: Free from fall injury  Outcome: Progressing  Note: Fall precautions in place. Bed alarm on, bed in lowest position, call light within reach, non slip socks, hourly rounding.      Problem: Skin/Tissue Integrity  Goal: Skin integrity remains intact  Description: 1.  Monitor for areas of redness and/or skin breakdown  2.  Assess vascular access sites hourly  3.  Every 4-6 hours minimum:  Change oxygen saturation probe site  4.  Every 4-6 hours:  If on nasal continuous positive airway pressure, respiratory therapy assess nares and determine need for appliance change or resting period  Outcome: Progressing  Note: Turning and pillow support utilized.      Problem: Confusion  Goal: Confusion, delirium, dementia, or psychosis is improved or at baseline  Description: INTERVENTIONS:  1. Assess for possible contributors to thought disturbance, including medications, impaired vision or hearing, underlying metabolic abnormalities, dehydration, psychiatric diagnoses, and notify attending LIP  2. [place] high risk fall precautions, as indicated  3. Provide frequent short contacts to provide reality reorientation, refocusing and direction  4. Decrease environmental stimuli, including noise as appropriate  5. Monitor and intervene to maintain adequate nutrition, hydration, elimination, sleep and activity  6. If unable to ensure safety without constant attention obtain sitter and review sitter guidelines with assigned personnel  7. Initiate Psychosocial CNS and Spiritual Care consult, as indicated  Outcome: Progressing     Problem: ABCDS Injury Assessment  Goal: Absence of physical injury  Outcome: Progressing
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
## 2025-07-14 PROBLEM — I61.0 NONTRAUMATIC SUBCORTICAL HEMORRHAGE OF RIGHT CEREBRAL HEMISPHERE (HCC): Status: ACTIVE | Noted: 2024-03-24

## 2025-07-14 PROBLEM — S09.90XA CLOSED HEAD INJURY: Status: ACTIVE | Noted: 2025-07-14

## 2025-07-14 LAB
ALBUMIN SERPL-MCNC: 3.6 G/DL (ref 3.4–5)
ANION GAP SERPL CALCULATED.3IONS-SCNC: 12 MMOL/L (ref 3–16)
BASOPHILS # BLD: 0.2 K/UL (ref 0–0.2)
BASOPHILS NFR BLD: 2 %
BUN SERPL-MCNC: 15 MG/DL (ref 7–20)
CALCIUM SERPL-MCNC: 9.5 MG/DL (ref 8.3–10.6)
CHLORIDE SERPL-SCNC: 105 MMOL/L (ref 99–110)
CHOLEST SERPL-MCNC: 209 MG/DL (ref 0–199)
CO2 SERPL-SCNC: 24 MMOL/L (ref 21–32)
CREAT SERPL-MCNC: 0.9 MG/DL (ref 0.6–1.2)
DEPRECATED RDW RBC AUTO: 14.7 % (ref 12.4–15.4)
EOSINOPHIL # BLD: 0.3 K/UL (ref 0–0.6)
EOSINOPHIL NFR BLD: 3 %
EST. AVERAGE GLUCOSE BLD GHB EST-MCNC: 105.4 MG/DL
GFR SERPLBLD CREATININE-BSD FMLA CKD-EPI: 61 ML/MIN/{1.73_M2}
GLUCOSE SERPL-MCNC: 93 MG/DL (ref 70–99)
HBA1C MFR BLD: 5.3 %
HCT VFR BLD AUTO: 36.7 % (ref 36–48)
HDLC SERPL-MCNC: 65 MG/DL (ref 40–60)
HGB BLD-MCNC: 12.5 G/DL (ref 12–16)
LDLC SERPL CALC-MCNC: 125 MG/DL
LYMPHOCYTES # BLD: 2.3 K/UL (ref 1–5.1)
LYMPHOCYTES NFR BLD: 27 %
MAGNESIUM SERPL-MCNC: 2.32 MG/DL (ref 1.8–2.4)
MCH RBC QN AUTO: 28.8 PG (ref 26–34)
MCHC RBC AUTO-ENTMCNC: 34.2 G/DL (ref 31–36)
MCV RBC AUTO: 84.3 FL (ref 80–100)
MONOCYTES # BLD: 1.4 K/UL (ref 0–1.3)
MONOCYTES NFR BLD: 17 %
NEUTROPHILS # BLD: 4.3 K/UL (ref 1.7–7.7)
NEUTROPHILS NFR BLD: 51 %
PATH INTERP BLD-IMP: NO
PHOSPHATE SERPL-MCNC: 3.4 MG/DL (ref 2.5–4.9)
PLATELET # BLD AUTO: ABNORMAL K/UL (ref 135–450)
PLATELET BLD QL SMEAR: ABNORMAL
PMV BLD AUTO: ABNORMAL FL (ref 5–10.5)
POTASSIUM SERPL-SCNC: 3.8 MMOL/L (ref 3.5–5.1)
RBC # BLD AUTO: 4.36 M/UL (ref 4–5.2)
RBC MORPH BLD: NORMAL
SLIDE REVIEW: ABNORMAL
SODIUM SERPL-SCNC: 141 MMOL/L (ref 136–145)
TRIGL SERPL-MCNC: 96 MG/DL (ref 0–150)
VLDLC SERPL CALC-MCNC: 19 MG/DL
WBC # BLD AUTO: 8.4 K/UL (ref 4–11)

## 2025-07-14 PROCEDURE — 6370000000 HC RX 637 (ALT 250 FOR IP)

## 2025-07-14 PROCEDURE — 36415 COLL VENOUS BLD VENIPUNCTURE: CPT

## 2025-07-14 PROCEDURE — 80069 RENAL FUNCTION PANEL: CPT

## 2025-07-14 PROCEDURE — 2060000000 HC ICU INTERMEDIATE R&B

## 2025-07-14 PROCEDURE — 85025 COMPLETE CBC W/AUTO DIFF WBC: CPT

## 2025-07-14 PROCEDURE — 80061 LIPID PANEL: CPT

## 2025-07-14 PROCEDURE — 83735 ASSAY OF MAGNESIUM: CPT

## 2025-07-14 PROCEDURE — 99231 SBSQ HOSP IP/OBS SF/LOW 25: CPT | Performed by: NURSE PRACTITIONER

## 2025-07-14 PROCEDURE — 83036 HEMOGLOBIN GLYCOSYLATED A1C: CPT

## 2025-07-14 RX ORDER — LEVETIRACETAM 500 MG/1
500 TABLET ORAL 2 TIMES DAILY
Status: DISCONTINUED | OUTPATIENT
Start: 2025-07-14 | End: 2025-07-15 | Stop reason: HOSPADM

## 2025-07-14 RX ADMIN — TRAZODONE HYDROCHLORIDE 50 MG: 50 TABLET ORAL at 00:19

## 2025-07-14 RX ADMIN — LEVETIRACETAM 500 MG: 500 TABLET, FILM COATED ORAL at 20:01

## 2025-07-14 RX ADMIN — LEVOTHYROXINE SODIUM 50 MCG: 0.05 TABLET ORAL at 09:19

## 2025-07-14 RX ADMIN — Medication 3 MG: at 20:01

## 2025-07-14 RX ADMIN — LOSARTAN POTASSIUM 100 MG: 25 TABLET, FILM COATED ORAL at 09:19

## 2025-07-14 RX ADMIN — MIRTAZAPINE 7.5 MG: 15 TABLET, FILM COATED ORAL at 20:01

## 2025-07-14 RX ADMIN — AMLODIPINE BESYLATE 10 MG: 10 TABLET ORAL at 09:19

## 2025-07-14 RX ADMIN — LEVETIRACETAM 500 MG: 500 TABLET, FILM COATED ORAL at 09:19

## 2025-07-14 RX ADMIN — TRAZODONE HYDROCHLORIDE 50 MG: 50 TABLET ORAL at 20:01

## 2025-07-14 ASSESSMENT — PAIN SCALES - GENERAL
PAINLEVEL_OUTOF10: 0

## 2025-07-14 NOTE — PLAN OF CARE
Problem: Discharge Planning  Goal: Discharge to home or other facility with appropriate resources  7/14/2025 1058 by Danna Pollard, RN  Outcome: Progressing  Patient actively participates in ADL's and decision making regarding plan of care.     Problem: Safety - Adult  Goal: Free from fall injury  7/14/2025 1058 by Danna Pollard, RN  Outcome: Progressing  No falls/injuries this shift, bed in lowest position, brakes on, bed alarm on, call light in reach, side rails up x2.     Problem: Skin/Tissue Integrity  Goal: Skin integrity remains intact  Description: 1.  Monitor for areas of redness and/or skin breakdown  2.  Assess vascular access sites hourly  3.  Every 4-6 hours minimum:  Change oxygen saturation probe site  4.  Every 4-6 hours:  If on nasal continuous positive airway pressure, respiratory therapy assess nares and determine need for appliance change or resting period  7/14/2025 1058 by Danna Pollard, RN  Outcome: Progressing  No new skin breakdown noted, no new signs/symptoms of infection, continue to monitor labwork including WBC, medications administered per physician orders.     Problem: Confusion  Goal: Confusion, delirium, dementia, or psychosis is improved or at baseline  Description: INTERVENTIONS:  1. Assess for possible contributors to thought disturbance, including medications, impaired vision or hearing, underlying metabolic abnormalities, dehydration, psychiatric diagnoses, and notify attending LIP  2. [place] high risk fall precautions, as indicated  3. Provide frequent short contacts to provide reality reorientation, refocusing and direction  4. Decrease environmental stimuli, including noise as appropriate  5. Monitor and intervene to maintain adequate nutrition, hydration, elimination, sleep and activity  6. If unable to ensure safety without constant attention obtain sitter and review sitter guidelines with assigned personnel  7. Initiate Psychosocial CNS and Spiritual Care consult, as

## 2025-07-14 NOTE — PLAN OF CARE
Problem: Skin/Tissue Integrity  Goal: Skin integrity remains intact  Description: 1.  Monitor for areas of redness and/or skin breakdown  2.  Assess vascular access sites hourly  3.  Every 4-6 hours minimum:  Change oxygen saturation probe site  4.  Every 4-6 hours:  If on nasal continuous positive airway pressure, respiratory therapy assess nares and determine need for appliance change or resting period  Outcome: Not Progressing  Pt ripped off mepilex that was placed over skin tear created d/t pt ripping out IV. New IV not able to placed d/t pts combativeness during care. Trying to keep pt dry w/ purewick and brief. Pt has rapidly tried to take off and becomes combative when trying to fix/reposition. Pt refusing being cleaned up ad ha to be held by 2-3 other staff members.     Problem: Confusion  Goal: Confusion, delirium, dementia, or psychosis is improved or at baseline  Description: INTERVENTIONS:  1. Assess for possible contributors to thought disturbance, including medications, impaired vision or hearing, underlying metabolic abnormalities, dehydration, psychiatric diagnoses, and notify attending LIP  2. [place] high risk fall precautions, as indicated  3. Provide frequent short contacts to provide reality reorientation, refocusing and direction  4. Decrease environmental stimuli, including noise as appropriate  5. Monitor and intervene to maintain adequate nutrition, hydration, elimination, sleep and activity  6. If unable to ensure safety without constant attention obtain sitter and review sitter guidelines with assigned personnel  7. Initiate Psychosocial CNS and Spiritual Care consult, as indicated  Outcome: Not Progressing  Pt remains confused, possibly experiencing hallucinations. RN doing everything to promote rest including therapeutic music and creating a low stim. environment. Pt remains restless, or only be calm 30 minutes at a time. RN having to basically sit for pt.     Problem: ABCDS Injury

## 2025-07-14 NOTE — PROGRESS NOTES
Patient alert x self, VSS, RA. Ambulates x 2 assist, tolerates poorly. Voiding via incontinent briefs. Low PO intake this shift. No c/o pain at this time. All safety precautions in place, call light/items in reach. Will continue to monitor.

## 2025-07-14 NOTE — PROGRESS NOTES
Pt only alert to self. Pt is intermittently calm, but remains combative, restless, and impulsive. Pt is possibly experiencing hallucinations. RN attempting to create a restful, peaceful environment for pt by playing calming and sleep promoting music, having lights off, warm blankets, and bundling pt. These remedies only last for 10-40 minutes at a time before the pt becomes restless and trying to get oob.     Resident team contacted multiple times to obtain something to help pt calm down and rest. Pt had to be moved closer to nurses station. RN worried about pts sleep schedule being flipped, which will only lead to increasing and exacerbating pts combativeness and delirium. Resident bedside, wanted RN to try her PRN oral trazodone. Pt spit it out repeatedly. RN was told to add some water to med cup and see if pt would take it that way. The med just ended up dissolving, and pt didn't fully take, not getting full dose. Resident said \"have a john\" and walked out. Was contacted again for a third time and was told to \"get a sitter.\" RN attempting to sit outside pt room while also meeting needs of other pts assigned.     Pt has skin tears d/t fall at home, but ripped out IV earlier in shift, causing another. Pt refusing to keep band aids/mepilexs on. Keeping an eye on skin and current tears to prevent further damage.       No acute events overnight.   Electronically signed by Gayle Pardo RN on 7/14/2025 at 3:21 AM

## 2025-07-14 NOTE — PROGRESS NOTES
NEUROSURGERY PROGRESS NOTE    ASCENCION KNIGHT [facility]   [id number]   1935   7/14/2025    Interval History: [facility] Day #1     Subjective:  patient resting in bed, per son at bedside she is more confused today but this is not entirely out of the normal for her as she has waxing/waning periods of worsening confusion at baseline.      Objective:  BP (!) 138/112   Pulse 66   Temp 98 °F (36.7 °C) (Axillary)   Resp 16   Ht 1.575 m (5' 2\")   Wt 59 kg (130 lb)   LMP  (LMP Unknown)   SpO2 97%   BMI 23.78 kg/m²     Labs:  Recent Labs     07/13/25  0827 07/14/25  0651    141    105   CO2 27 24   BUN 20 15   CREATININE 1.1 0.9   GLUCOSE 110* 93     Recent Labs     07/13/25  0827 07/14/25  0651   WBC 8.2 8.4   RBC 4.38 4.36   INR 0.98  --      CT head without contrast   Final Result      1. 2 cm hyperdense intraparenchymal hemorrhage within the right parietal lobe is stable.   2. Otherwise stable appearance of the brain.      Electronically signed by Erwin Ordoñez MD      CT C-Spine W/O Contrast   Final Result      No acute fracture. Cervical degenerative changes as above.      Electronically signed by Sam Dorado      XR CHEST PORTABLE   Final Result      No acute findings are seen.      Electronically signed by Sam Dorado      CT Head W/O Contrast   Final Result      1. Right posterior parietal occipital intraparenchymal hemorrhage with surrounding edema. No midline shift   2. There is a large left frontal scalp hematoma   3. Atrophy and chronic ischemia       Results phoned in to ordering department provider at time of report.      Electronically signed by Sam Dorado          Neurologic Exam:  GCS:  3 - Opens eyes to loud noise or command  4 - Seems confused, disoriented  6 - Follows simple motor commands    Mental Status: Awake, alert, oriented to person only    Cranial Nerves:  II: Visual acuity not tested,  III, IV, VI: PERRL, 3 mm bilaterally, some left visual deficits noted by not tracking

## 2025-07-14 NOTE — PROGRESS NOTES
Current NIHSS 4    Nursing Core Measures for Stroke:   [x]   Education template documentation (STROKE/TIA). Select only risk factors that are applicable to patient when selecting risk factors.  [x]   Care Plan template documentation (Physiologic Instability - Neurosensory). Selecting this will add care plan rows to the flowsheet under the Neuro section of Head to Toe.  [x]   Verified Swallow Screen completed prior to PO intake of food, drink, medications.          Please verify correct medication route prior to administration for intubated patients, patients who can not swallow or have alternative routes of intake (NG, OG, PA), etc  []   VTE Prophylaxis: SCDs ordered/addressed; SCDs: Refused           (As a reminder, ASA, Plavix, and TPA/TNK are not VTE prophylaxis.)    Reviewed the Following Education with Patient and/or Family:   - Personalized risk factors for patient, along with changes, modifications that will help prevent stroke.  - Signs and Symptoms of Stroke: (Facial droop, weakness/numbness especially on one side, speech difficulty, sudden confusion, sudden loss of vision, sudden severe headache, sudden loss of balance or having difficulty walking, syncope, or seizure)  - How to activate EMS (911)   - Importance of Follow Up Appointments at Discharge   - Importance of Compliance with Medications Prescribed at Discharge  - Available community resources and stroke advocacy groups if needed    Patient and/or family member: with no evidence of learning.     Stroke Education booklet given to patient/family (or verified, if given already), which reviews above information. yes         Electronically signed by Danna Pollard RN on 7/14/2025 at 12:24 PM

## 2025-07-14 NOTE — CARE COORDINATION
PATH-7 Caregiver Assessment Tool    How prepared are you to provide the patient assistance with personal care (such as bathing, using the toilet, dressing and moving around, medications, wound care) when he/she goes home? Patient lives in Memory Care AL     Do you have any physical or mental health problems (for example: difficulty bending and stooping, back or joint problems, heart issues, memory issues, depression, anexiety or other health challenges)? Patient lives in Memory Care AL     Do you have other roles and responsibilities other than providing care for the patient (for example: work, volunteer work, childcare, meal preparation, laundry, home maintenance and yard work)? Patient lives in memory care AL     Do you have other people (for example: co-workers, your [employer], a club, social group) who will be able to help your other responsibilities (for example: work, volunteer work, childcare, meal preparation, laundry, home maintenance and yard work)? Memory Care AL     Will there be any accessibility problems for the patient getting around in the house or using the toilet or shower (for example: the width of doorways, climbing stairs, ramp access) in the home where he/she will be living? Memory Care AL     Will the patient have accessible transportation (for example: car that he/she can get in and out of, someone to drive, transportation options, etc.) that he/she can use to go places (e.g. the doctor, grocery store)? Yes - no transportation issues identified     Thinking over the past year, how much conflict have you had in your relationship with the patient?  na       Case Management recommendations from information gathered from PATH-7 Caregiver Assessment include: None- no additional needs identified at this time

## 2025-07-14 NOTE — CARE COORDINATION
Spoke to [facility] and they discharged patient because she came to hospital yesterday. Electronically signed by Lynette Melissa RN on 7/14/2025 at 12:37 PM

## 2025-07-14 NOTE — CARE COORDINATION
Spoke to son at bedside. Son feels that a SNF for PT/OT would be fruitless. Patient cannot retain any info taught to her. Patient was agitated overnight. Patient is sleeping at moment. Unless patient has medical needs that are beyond the AL Memory Care at [facility], son wishes the patient to go back to [facility]. Patient's baseline ambulation is that she walks with small steps and refuses to use a walker or cane. This CM perfectserving Dr. Iverson to discuss discharge needs. CM will continue to follow patient until discharge.  Electronically signed by Lynette Melissa RN on 7/14/2025 at 11:36 AM

## 2025-07-14 NOTE — PROGRESS NOTES
Occupational Therapy / Physical Therapy  Hold    OT / PT orders rec'd and chart reviewed. Spoke w/ CM and RN  - pt is sleeping, was agitated overnight. Holding evaluation at this time. Will re-attempt later time vs later date as appropriate, and schedules permit.    Kaci Geiger, MOT-OTR/L [id number]    Prabhjot Song, PT, DPT

## 2025-07-14 NOTE — CARE COORDINATION
Called [facility] to find out baseline function of patient and LM for the nurse on that [address] unit. CM will continue to follow patient until discharge.  Electronically signed by Lynette Melissa RN on 7/14/2025 at 10:42 AM

## 2025-07-14 NOTE — PROGRESS NOTES
Internal Medicine Progress Note    Date: 7/14/2025   Patient: Doreen Gilbert   Hospital Day: 1      CC: Fall (Sent from SNF fro unwitnessed fall. Was found on floor with abrasion noted above left eye. Staff also sts that pt has become combative with care over the last 2 weeks. Is pt of [facility])       Interval Hx   Overnight patient had some events of agitation, pulling out her IV lines. Patient given trazodone and given a sitter. When evaluated this morning, patient was resting in bed. She is mildly confused but able to communicate. Unable to assess if at baseline but seems to line up with her reported baseline.    Patient denies chest pain, shortness of breath, fever, chills, headache, fatigue, nausea, or vomiting      HPI (from H&P):   \"Ms. Doreen Gilbert is a 89 y.o. female with a MHx significant for, multiple prior falls, stroke, advanced Alzheimer dementia, CKD-3, hypothyroidism, HTN (w/ episode of hypertensive emergency) who presented to the ED in the morning of July 13 with  un-witnessed fall at her nursing facility.      ED Course:  Oriented to person only; combative  Vitals: BP of 191/83, HR of 68, Temp of 97.6, SpO2 of 98  Physical Exam: Oriented to self only; Large hematoma at the left forehead with small overlying abrasion   EKG: unable to to combativeness  Labs: Platelets 131, PT 13.3, aPTT 34; Glu 110, GFR 48, Cr 1.1  Imaging: Brain CT w/o contrast: intraparenchymal hemorrhage   Treatment: fluids, Keppra     The ED team consulted the neuro team and performed a head/spine CT w/o contrast... which showed R posterior parietal & R occipital parenchyma hemorrhage with edema w/o midline shift.  Per neuro pt is not a good surgical candidate due to complicities & age     Ms. Gilbert was admitted to the Internal Medicine wards for the evaluation and follow-up of her intracranial bleed. During our interview her son was by the bedside and provided most of the history; the pt reported mild

## 2025-07-15 VITALS
WEIGHT: 130 LBS | HEIGHT: 62 IN | RESPIRATION RATE: 16 BRPM | OXYGEN SATURATION: 93 % | BODY MASS INDEX: 23.92 KG/M2 | DIASTOLIC BLOOD PRESSURE: 70 MMHG | HEART RATE: 60 BPM | SYSTOLIC BLOOD PRESSURE: 138 MMHG | TEMPERATURE: 97.9 F

## 2025-07-15 LAB
ALBUMIN SERPL-MCNC: 3.6 G/DL (ref 3.4–5)
ANION GAP SERPL CALCULATED.3IONS-SCNC: 15 MMOL/L (ref 3–16)
BASOPHILS # BLD: 0 K/UL (ref 0–0.2)
BASOPHILS NFR BLD: 0 %
BUN SERPL-MCNC: 19 MG/DL (ref 7–20)
CALCIUM SERPL-MCNC: 9.5 MG/DL (ref 8.3–10.6)
CHLORIDE SERPL-SCNC: 100 MMOL/L (ref 99–110)
CO2 SERPL-SCNC: 20 MMOL/L (ref 21–32)
CREAT SERPL-MCNC: 1.3 MG/DL (ref 0.6–1.2)
DEPRECATED RDW RBC AUTO: 15 % (ref 12.4–15.4)
EOSINOPHIL # BLD: 0 K/UL (ref 0–0.6)
EOSINOPHIL NFR BLD: 0 %
GFR SERPLBLD CREATININE-BSD FMLA CKD-EPI: 39 ML/MIN/{1.73_M2}
GLUCOSE SERPL-MCNC: 89 MG/DL (ref 70–99)
HCT VFR BLD AUTO: 38.7 % (ref 36–48)
HGB BLD-MCNC: 13.4 G/DL (ref 12–16)
LYMPHOCYTES # BLD: 2.9 K/UL (ref 1–5.1)
LYMPHOCYTES NFR BLD: 35 %
MAGNESIUM SERPL-MCNC: 2.17 MG/DL (ref 1.8–2.4)
MCH RBC QN AUTO: 29.1 PG (ref 26–34)
MCHC RBC AUTO-ENTMCNC: 34.6 G/DL (ref 31–36)
MCV RBC AUTO: 84.1 FL (ref 80–100)
MONOCYTES # BLD: 0.6 K/UL (ref 0–1.3)
MONOCYTES NFR BLD: 7 %
NEUTROPHILS # BLD: 4.8 K/UL (ref 1.7–7.7)
NEUTROPHILS NFR BLD: 58 %
PHOSPHATE SERPL-MCNC: 4.1 MG/DL (ref 2.5–4.9)
PLATELET # BLD AUTO: NORMAL K/UL (ref 135–450)
PLATELET BLD QL SMEAR: NORMAL
PMV BLD AUTO: NORMAL FL (ref 5–10.5)
POTASSIUM SERPL-SCNC: ABNORMAL MMOL/L (ref 3.5–5.1)
RBC # BLD AUTO: 4.6 M/UL (ref 4–5.2)
SLIDE REVIEW: NORMAL
SODIUM SERPL-SCNC: 135 MMOL/L (ref 136–145)
WBC # BLD AUTO: 8.2 K/UL (ref 4–11)

## 2025-07-15 PROCEDURE — 80069 RENAL FUNCTION PANEL: CPT

## 2025-07-15 PROCEDURE — 6370000000 HC RX 637 (ALT 250 FOR IP)

## 2025-07-15 PROCEDURE — 83735 ASSAY OF MAGNESIUM: CPT

## 2025-07-15 PROCEDURE — 36415 COLL VENOUS BLD VENIPUNCTURE: CPT

## 2025-07-15 PROCEDURE — 85025 COMPLETE CBC W/AUTO DIFF WBC: CPT

## 2025-07-15 RX ORDER — LEVETIRACETAM 500 MG/1
500 TABLET ORAL 2 TIMES DAILY
Qty: 60 TABLET | Refills: 3 | Status: SHIPPED | OUTPATIENT
Start: 2025-07-15

## 2025-07-15 RX ADMIN — LOSARTAN POTASSIUM 100 MG: 25 TABLET, FILM COATED ORAL at 09:21

## 2025-07-15 RX ADMIN — AMLODIPINE BESYLATE 10 MG: 10 TABLET ORAL at 09:22

## 2025-07-15 RX ADMIN — LEVETIRACETAM 500 MG: 500 TABLET, FILM COATED ORAL at 09:21

## 2025-07-15 RX ADMIN — LEVOTHYROXINE SODIUM 50 MCG: 0.05 TABLET ORAL at 09:21

## 2025-07-15 NOTE — PROGRESS NOTES
Occupational Therapy/Physical Therapy  No Evaluation- Sign Off  PT/OT orders received and chart reviewed. Pt family requesting no therapy evaluations. Will sign off. Please re consult if wishes change. RN on phone throughout conversation and aware. Thank you.    Germaine Sierra, OTR/L [id number]

## 2025-07-15 NOTE — CARE COORDINATION
Case Management Assessment            Discharge Note                    Date / Time of Note: 7/15/2025 2:01 PM                  Discharge Note Completed by: RILEY Rios    Patient Name: Doreen Gilbert   YOB: 1935  Diagnosis: Closed head injury, initial encounter [S09.90XA]  Intraparenchymal hematoma of brain due to trauma with loss of consciousness and death due to brain injury prior to regaining consciousness, initial encounter [S06.337A]   Date / Time: 7/13/2025  7:08 AM    Current PCP: Odalys Dale MD  Clinic patient: No    Hospitalization in the last 30 days: No       Advance Directives:  Code Status: DNR-[facility] DNR form completed and on chart: No    Financial:  Payor: [employer] MEDICARE / Plan: [employer] CHOICE-PPO MEDICARE / Product Type: *No Product type* /      Pharmacy:    [facility]/pharmacy #[id number] - [place], [place] - [address] [place] [address] - P [phone] - F [phone]  [address] [place] [address]  [place] [address]  Phone: [phone] Fax: [phone]      Assistance purchasing medications?:    Assistance provided by Case Management: None at this time    Does patient want to participate in local refill/ meds to beds program?:      Meds To Beds General Rules:  1. Can ONLY be done Monday- Friday between 8:30am-5pm  2. Prescription(s) must be in pharmacy by 3pm to be filled same day  3.Copy of patient's insurance/ prescription drug card and patient face sheet must be sent along with the prescription(s)  4. Cost of Rx cannot be added to hospital bill. If financial assistance is needed, please contact unit  or ;  or  CANNOT provide pharmacy voucher for patients co-pays  5. Patients can then  the prescription on their way out of the hospital at discharge, or pharmacy can deliver to the bedside if staff is available. (payment due at time of pick-up or delivery - cash, check, or card accepted)     Able to afford home medications/ co-pay

## 2025-07-15 NOTE — PROGRESS NOTES
Pt alert to self. VSS on room air. Pt assist x2 stand pivot to bedside commode. Pt voiding per BSC and incontinent briefs. Pt to discharge back to facility per medical transportation. Pt son has been updated by this RN face to face. All safety precautions are in place; plan of care to continue.

## 2025-07-15 NOTE — PLAN OF CARE
Problem: Discharge Planning  Goal: Discharge to home or other facility with appropriate resources  7/15/2025 0743 by Susie Marina RN  Outcome: Progressing     Problem: Safety - Adult  Goal: Free from fall injury  7/15/2025 0743 by Susie Marina RN  Outcome: Progressing     Problem: Skin/Tissue Integrity  Goal: Skin integrity remains intact  Description: 1.  Monitor for areas of redness and/or skin breakdown  2.  Assess vascular access sites hourly  3.  Every 4-6 hours minimum:  Change oxygen saturation probe site  4.  Every 4-6 hours:  If on nasal continuous positive airway pressure, respiratory therapy assess nares and determine need for appliance change or resting period  7/15/2025 0743 by Susie Marina RN  Outcome: Progressing     Problem: Confusion  Goal: Confusion, delirium, dementia, or psychosis is improved or at baseline  Description: INTERVENTIONS:  1. Assess for possible contributors to thought disturbance, including medications, impaired vision or hearing, underlying metabolic abnormalities, dehydration, psychiatric diagnoses, and notify attending LIP  2. [place] high risk fall precautions, as indicated  3. Provide frequent short contacts to provide reality reorientation, refocusing and direction  4. Decrease environmental stimuli, including noise as appropriate  5. Monitor and intervene to maintain adequate nutrition, hydration, elimination, sleep and activity  6. If unable to ensure safety without constant attention obtain sitter and review sitter guidelines with assigned personnel  7. Initiate Psychosocial CNS and Spiritual Care consult, as indicated  7/15/2025 0743 by Susie Marina RN  Outcome: Progressing     Problem: ABCDS Injury Assessment  Goal: Absence of physical injury  7/15/2025 0743 by Susie Marina RN  Outcome: Progressing

## 2025-07-15 NOTE — PLAN OF CARE
Problem: Discharge Planning  Goal: Discharge to home or other facility with appropriate resources  7/15/2025 0036 by Misha Newman RN  Outcome: Progressing  7/14/2025 1058 by Danna Pollard RN  Outcome: Progressing  7/14/2025 1058 by Danna Pollard RN  Outcome: Progressing     Problem: Safety - Adult  Goal: Free from fall injury  7/15/2025 0036 by Misha Newman RN  Outcome: Progressing  7/14/2025 1058 by Danna Pollard RN  Outcome: Progressing  7/14/2025 1058 by Danna Pollard RN  Outcome: Progressing     Problem: Skin/Tissue Integrity  Goal: Skin integrity remains intact  Description: 1.  Monitor for areas of redness and/or skin breakdown  2.  Assess vascular access sites hourly  3.  Every 4-6 hours minimum:  Change oxygen saturation probe site  4.  Every 4-6 hours:  If on nasal continuous positive airway pressure, respiratory therapy assess nares and determine need for appliance change or resting period  7/15/2025 0036 by Misha Newman RN  Outcome: Progressing  7/14/2025 1058 by Danna Pollard RN  Outcome: Progressing  7/14/2025 1058 by Danna Pollard RN  Outcome: Progressing     Problem: Confusion  Goal: Confusion, delirium, dementia, or psychosis is improved or at baseline  Description: INTERVENTIONS:  1. Assess for possible contributors to thought disturbance, including medications, impaired vision or hearing, underlying metabolic abnormalities, dehydration, psychiatric diagnoses, and notify attending LIP  2. [place] high risk fall precautions, as indicated  3. Provide frequent short contacts to provide reality reorientation, refocusing and direction  4. Decrease environmental stimuli, including noise as appropriate  5. Monitor and intervene to maintain adequate nutrition, hydration, elimination, sleep and activity  6. If unable to ensure safety without constant attention obtain sitter and review sitter guidelines with assigned personnel  7. Initiate Psychosocial CNS and Spiritual Care consult, as

## 2025-07-15 NOTE — PLAN OF CARE
Residents were contacted twice within 7 minutes around midnight (while residents were attending to an acutely ill patient in contact precautions). During this time Ms. Gilbert was being moved to a different room  When residents went in to [facility]5507 to evaluate patient, she was quiet, calm and not bothering anyone at all with her eyes closed. At that time we felt it was inappropriate to give sedative medication ie. IM haldol when the patient is currently calm. We recommended if nurse wanted she can give the patient her home nightly trazodone. While nurse giving it, it was placed on patients tongue and didn't taste very good so the patient spit it out, there did not seem to be any malicious intent.    We discussed obtaining a sitter because nursing stated their main concern at that time was patient trying to get up out of bed. We felt having someone redirect if needed would be a benefit. Nurse manager stated they could try and pull one from the floor, but we waited at that time due to patient being calm. Exited room politely stating to patient have a good night.    Contacted two hours later: patient trying to exit bed. Nurses concerned patient may get up out of bed and fall. Dicussed that for this it would be more appropriate to try and please have a sitter come and be with patient. No response or update after that.    Donis Welsh MD  Internal Medicine, PGY-3

## 2025-07-15 NOTE — DISCHARGE INSTR - COC
Continuity of Care Form    Patient Name: Doreen Gilbert   :  1935  MRN:  [id number]    Admit date:  2025  Discharge date:  7/15/25    Code Status Order: DNR-CCA   Advance Directives:     Admitting Physician:  Bertrand Salmeron MD  PCP: Odalys Dale MD    Discharging Nurse: [facility]/Room[facility] 5507/5507-01  Discharging Unit Phone Number: [phone]    Emergency Contact:   Extended Emergency Contact Information  Primary Emergency Contact: johnathan ospina  Address: [address] [place] [address] [facility]  Home Phone: [phone]  Relation: Child   needed? No  Secondary Emergency Contact: Catherine Gonzales  Address: [address] [place] [address] [facility]  Home Phone: [phone]  Relation: Brother/Sister   needed? No    Past Surgical History:  Past Surgical History:   Procedure Laterality Date    APPENDECTOMY      CATARACT REMOVAL      HYSTERECTOMY, VAGINAL      TONSILLECTOMY N/A        Immunization History:   Immunization History   Administered Date(s) Administered    COVID-19, J&J, (age 18y+), IM, 0.5 mL 2021    Influenza, FLUAD, (age 65 y+), IM, Quadv, 0.5mL 2021, 2022    Influenza, FLUAD, (age 65 y+), IM, Trivalent PF, 0.5mL 2019, 2024    Influenza, FLUZONE High Dose, (age 65 y+), IM, Trivalent PF, 0.5mL 2002, 10/10/2003, 10/05/2004, 2010, 2013, 2014, 2016, 2018, 10/21/2020    Pneumococcal, PCV-13, PREVNAR 13, (age 6w+), IM, 0.5mL 2017    Pneumococcal, PPSV23, PNEUMOVAX 23, (age 2y+), SC/IM, 0.5mL 2002, 2015    TDaP, ADACEL (age 10y-64y), BOOSTRIX (age 10y+), IM, 0.5mL 2024    Td vaccine (adult) 2014    Td, unspecified formulation 2014    Zoster Recombinant (Shingrix) 2023, 2023       Active Problems:  Patient Active Problem List   Diagnosis Code    Hiatal hernia K44.9    GERD  (gastroesophageal reflux disease) K21.9    Hypercholesterolemia E78.00    Hyperuricemia E79.0    Renal impairment N28.9    Hyperlipidemia E78.5    Hypertension I10    Hypothyroidism E03.9    Osteoarthritis M19.90    Stage 3 chronic kidney disease, unspecified whether stage 3a or 3b CKD ([facility]) N18.30    Cervical disc disease M50.90    Alzheimer's dementia ([facility]) G30.9, F02.80    Right shoulder pain M25.511    Hypertensive heart and chronic kidney disease with heart failure and stage 1 through stage 4 chronic kidney disease, or unspecified chronic kidney disease ([facility]) I13.0    Nontraumatic subcortical hemorrhage of right cerebral hemisphere ([facility]) I61.0    Intracranial hemorrhage ([facility]) I62.9    Traumatic hemorrhage of cerebrum, unspecified, with loss of consciousness of 30 minutes or less, initial encounter ([facility]) S06.361A    Intraparenchymal hematoma of brain due to trauma with loss of consciousness and death due to brain injury prior to regaining consciousness, initial encounter S06.337A    Closed head injury S09.90XA       Isolation/Infection:   Isolation            No Isolation          Patient Infection Status    None to display         Nurse Assessment:  Last Vital Signs: BP (!) 155/86   Pulse 70   Temp 97.3 °F (36.3 °C) (Axillary)   Resp 16   Ht 1.575 m (5' 2\")   Wt 59 kg (130 lb)   LMP  (LMP Unknown)   SpO2 99%   BMI 23.78 kg/m²     Last documented pain score (0-10 scale): Pain Level: 0  Last Weight:   Wt Readings from Last 1 Encounters:   07/14/25 59 kg (130 lb)     Mental Status:  disoriented    IV Access:  - None    Nursing Mobility/ADLs:  Walking   Assisted  Transfer  Assisted  Bathing  Assisted  Dressing  Assisted  Toileting  Assisted  Feeding  Assisted  Med Admin  Assisted  Med Delivery   whole    Wound Care Documentation and Therapy:        Elimination:  Continence:   Bowel: No  Bladder: No  Urinary Catheter: None   Colostomy/Ileostomy/Ileal Conduit: No       Date of Last BM:     Intake/Output

## 2025-07-15 NOTE — DISCHARGE SUMMARY
INTERNAL MEDICINE DEPARTMENT AT THE [facility]  DISCHARGE SUMMARY    Patient ID: Doreen Gilbert                                             Discharge Date: 7/15/2025   Patient's PCP: Odalys Dale MD                                          Discharge Physician: Jazmyne Iverson MD  Admit Date: 7/13/2025   Admitting Physician: Bertrand Salmeron MD    PROBLEMS DURING HOSPITALIZATION:  Present on Admission:   Intraparenchymal hemorrhage of the brain      HPI:  Ms. Doreen Gilbert is a 89 y.o. female with a MHx significant for, multiple prior falls, stroke, advanced Alzheimer dementia, CKD-3, hypothyroidism, HTN (w/ episode of hypertensive emergency) who presented to the ED in the morning of July 13 with un-witnessed fall at her nursing facility.      ED Course:  Oriented to person only; combative  Vitals: BP of 191/83, HR of 68, Temp of 97.6, SpO2 of 98  Physical Exam: Oriented to self only; Large hematoma at the left forehead with small overlying abrasion   EKG: unable to to combativeness  Labs: Platelets 131, PT 13.3, aPTT 34; Glu 110, GFR 48, Cr 1.1  Imaging: Brain CT w/o contrast: intraparenchymal hemorrhage   Treatment: fluids, Keppra     The ED team consulted the neuro team and performed a head/spine CT w/o contrast... which showed R posterior parietal & R occipital parenchyma hemorrhage with edema w/o midline shift.  Per neurosurgery pt is not a good surgical candidate due to complicities & age    Ms. Gilbert was admitted to the Internal Medicine wards for the evaluation and follow-up of her intracranial bleed. During our interview her son was by the bedside and provided most of the history; the pt reported mild headache, was oriented only to person, and was not able to provide any other information.  We also called the Memory Care nursing facility who mentioned that the pt was in her usual state of health last night at dinner; The LKW time was provided by the son who visited her around 7pm. She was seen

## 2025-07-15 NOTE — PROGRESS NOTES
Pt ano1 (self). VSSRA. Assist x2. UOP adequate incontient briefs. Decreased PO intake this shift. No pain endorsed. All fall / safety precautions in place. NAEO / POC cont.

## 2025-07-15 NOTE — DISCHARGE INSTRUCTIONS
Dear Doreen Gilbert    You were admitted for intraparenchymal hemorrhage (brain bleed). You are stable for discharge.  - Please take the following medications as instructed  New: Take Keppra 500mg (1 tablet) two times per day.  Stop taking Ibuprofen and aspirin for pain given that these can increase the likelihood of bleeding.    - Please follow up with the following specialists  Schedule a follow up with your primary care doctor to discuss your hospital stay      It was a pleasure taking care of you.     If you develop seizures, worsened confusion, chest pain, shortness of breath please determine what action would be best in line with the patients ultimate goal. If you would like to pursue further life prolonging measures, go to the nearest emergency department.

## 2025-07-17 NOTE — PROGRESS NOTES
Physician Progress Note      PATIENT:               ASCENCION BURNS  CSN #:                  [id number]  :                       1935  ADMIT DATE:       2025 7:08 AM  DISCH DATE:        7/15/2025 4:21 PM  RESPONDING  PROVIDER #:        SHANIKA PARK          QUERY TEXT:    Based on your medical judgment, please clarify these findings and document if   any of the following are being evaluated and/or treated:    The clinical indicators include:  -89 yo female w/ dementia s/p fall in memory care unit, found to have right   parietal ICH  -CT 25 \"The parenchyma shows age-related cortical volume loss with   compensatory ventricular enlargement.Chronic parenchymal ischemia is   seen.There is a left frontal scalp hematoma.There is a right posterior   parietal occipital intraparenchymal hematoma measuring about 18 x 22 mm with   surrounding edema.No midline shift.\"  -Keppra, Neurosurgery consult, neurochecks, repeat CT, Correct any    coagulopathy , SBP <160 ,HOB 30 degree with neck midline  Options provided:  -- Cerebral edema  -- Other - I will add my own diagnosis  -- Disagree - Not applicable / Not valid  -- Disagree - Clinically unable to determine / Unknown  -- Refer to Clinical Documentation Reviewer    PROVIDER RESPONSE TEXT:    This patient has cerebral edema.    Query created by: Alka Medina on 2025 2:15 PM      Electronically signed by:  SHANIKA PARK 2025 3:51 PM